# Patient Record
Sex: FEMALE | Race: WHITE | NOT HISPANIC OR LATINO | Employment: PART TIME | URBAN - METROPOLITAN AREA
[De-identification: names, ages, dates, MRNs, and addresses within clinical notes are randomized per-mention and may not be internally consistent; named-entity substitution may affect disease eponyms.]

---

## 2017-03-23 ENCOUNTER — TRANSCRIBE ORDERS (OUTPATIENT)
Dept: ADMINISTRATIVE | Facility: HOSPITAL | Age: 67
End: 2017-03-23

## 2017-03-23 DIAGNOSIS — Z12.31 VISIT FOR SCREENING MAMMOGRAM: Primary | ICD-10-CM

## 2017-04-21 ENCOUNTER — HOSPITAL ENCOUNTER (OUTPATIENT)
Dept: MAMMOGRAPHY | Facility: HOSPITAL | Age: 67
Discharge: HOME | End: 2017-04-21
Admitting: INTERNAL MEDICINE

## 2017-04-21 DIAGNOSIS — Z12.31 VISIT FOR SCREENING MAMMOGRAM: ICD-10-CM

## 2017-04-21 PROCEDURE — G0202 SCR MAMMO BI INCL CAD: HCPCS

## 2017-08-08 DIAGNOSIS — Z00.00 HEALTHCARE MAINTENANCE: Primary | ICD-10-CM

## 2017-08-08 DIAGNOSIS — E78.89 LIPIDS ABNORMAL: ICD-10-CM

## 2017-08-08 DIAGNOSIS — M85.80 OSTEOPENIA: ICD-10-CM

## 2017-08-08 DIAGNOSIS — E55.9 AVITAMINOSIS D: ICD-10-CM

## 2017-08-12 LAB
25(OH)D3+25(OH)D2 SERPL-MCNC: 23.8 NG/ML (ref 30–100)
ALBUMIN SERPL-MCNC: 3.9 G/DL (ref 3.5–5.2)
ALBUMIN/GLOB SERPL: 1.4 G/DL
ALP SERPL-CCNC: 43 U/L (ref 39–117)
ALT SERPL-CCNC: 18 U/L (ref 1–33)
APPEARANCE UR: ABNORMAL
AST SERPL-CCNC: 20 U/L (ref 1–32)
BACTERIA #/AREA URNS HPF: ABNORMAL /HPF
BACTERIA UR CULT: NO GROWTH
BACTERIA UR CULT: NORMAL
BASOPHILS # BLD AUTO: 0.04 10*3/MM3 (ref 0–0.2)
BASOPHILS NFR BLD AUTO: 0.9 % (ref 0–1.5)
BILIRUB SERPL-MCNC: 0.5 MG/DL (ref 0.1–1.2)
BILIRUB UR QL STRIP: NEGATIVE
BUN SERPL-MCNC: 10 MG/DL (ref 8–23)
BUN/CREAT SERPL: 12.3 (ref 7–25)
CALCIUM SERPL-MCNC: 9.3 MG/DL (ref 8.6–10.5)
CASTS URNS MICRO: ABNORMAL
CASTS URNS QL MICRO: PRESENT /LPF
CHLORIDE SERPL-SCNC: 99 MMOL/L (ref 98–107)
CHOLEST SERPL-MCNC: 181 MG/DL (ref 0–200)
CO2 SERPL-SCNC: 27.5 MMOL/L (ref 22–29)
COLOR UR: YELLOW
CREAT SERPL-MCNC: 0.81 MG/DL (ref 0.57–1)
EOSINOPHIL # BLD AUTO: 0.07 10*3/MM3 (ref 0–0.7)
EOSINOPHIL NFR BLD AUTO: 1.5 % (ref 0.3–6.2)
EPI CELLS #/AREA URNS HPF: ABNORMAL /HPF
ERYTHROCYTE [DISTWIDTH] IN BLOOD BY AUTOMATED COUNT: 14 % (ref 11.7–13)
GLOBULIN SER CALC-MCNC: 2.7 GM/DL
GLUCOSE SERPL-MCNC: 95 MG/DL (ref 65–99)
GLUCOSE UR QL: NEGATIVE
HCT VFR BLD AUTO: 41.7 % (ref 35.6–45.5)
HDLC SERPL-MCNC: 59 MG/DL (ref 40–60)
HGB BLD-MCNC: 13 G/DL (ref 11.9–15.5)
HGB UR QL STRIP: NEGATIVE
IMM GRANULOCYTES # BLD: 0 10*3/MM3 (ref 0–0.03)
IMM GRANULOCYTES NFR BLD: 0 % (ref 0–0.5)
KETONES UR QL STRIP: ABNORMAL
LDLC SERPL CALC-MCNC: 112 MG/DL (ref 0–100)
LEUKOCYTE ESTERASE UR QL STRIP: ABNORMAL
LYMPHOCYTES # BLD AUTO: 1.78 10*3/MM3 (ref 0.9–4.8)
LYMPHOCYTES NFR BLD AUTO: 38.9 % (ref 19.6–45.3)
MCH RBC QN AUTO: 30.1 PG (ref 26.9–32)
MCHC RBC AUTO-ENTMCNC: 31.2 G/DL (ref 32.4–36.3)
MCV RBC AUTO: 96.5 FL (ref 80.5–98.2)
MICRO URNS: ABNORMAL
MONOCYTES # BLD AUTO: 0.46 10*3/MM3 (ref 0.2–1.2)
MONOCYTES NFR BLD AUTO: 10 % (ref 5–12)
MUCOUS THREADS URNS QL MICRO: PRESENT /HPF
NEUTROPHILS # BLD AUTO: 2.23 10*3/MM3 (ref 1.9–8.1)
NEUTROPHILS NFR BLD AUTO: 48.7 % (ref 42.7–76)
NITRITE UR QL STRIP: NEGATIVE
PH UR STRIP: 6 [PH] (ref 5–7.5)
PLATELET # BLD AUTO: 211 10*3/MM3 (ref 140–500)
POTASSIUM SERPL-SCNC: 4.2 MMOL/L (ref 3.5–5.2)
PROT SERPL-MCNC: 6.6 G/DL (ref 6–8.5)
PROT UR QL STRIP: ABNORMAL
RBC # BLD AUTO: 4.32 10*6/MM3 (ref 3.9–5.2)
RBC #/AREA URNS HPF: ABNORMAL /HPF
SODIUM SERPL-SCNC: 139 MMOL/L (ref 136–145)
SP GR UR: 1.02 (ref 1–1.03)
TRIGL SERPL-MCNC: 51 MG/DL (ref 0–150)
URINALYSIS REFLEX: ABNORMAL
UROBILINOGEN UR STRIP-MCNC: 0.2 MG/DL (ref 0.2–1)
VLDLC SERPL CALC-MCNC: 10.2 MG/DL (ref 5–40)
WBC # BLD AUTO: 4.58 10*3/MM3 (ref 4.5–10.7)
WBC #/AREA URNS HPF: ABNORMAL /HPF

## 2017-08-17 ENCOUNTER — OFFICE VISIT (OUTPATIENT)
Dept: INTERNAL MEDICINE | Facility: CLINIC | Age: 67
End: 2017-08-17

## 2017-08-17 VITALS
TEMPERATURE: 98.2 F | RESPIRATION RATE: 12 BRPM | WEIGHT: 128 LBS | HEIGHT: 66 IN | OXYGEN SATURATION: 99 % | HEART RATE: 66 BPM | SYSTOLIC BLOOD PRESSURE: 118 MMHG | BODY MASS INDEX: 20.57 KG/M2 | DIASTOLIC BLOOD PRESSURE: 70 MMHG

## 2017-08-17 DIAGNOSIS — M19.042 PRIMARY OSTEOARTHRITIS OF BOTH HANDS: ICD-10-CM

## 2017-08-17 DIAGNOSIS — M85.9 DISORDER OF BONE DENSITY AND STRUCTURE, UNSPECIFIED: ICD-10-CM

## 2017-08-17 DIAGNOSIS — M85.80 OSTEOPENIA: ICD-10-CM

## 2017-08-17 DIAGNOSIS — Z00.00 HEALTHCARE MAINTENANCE: Primary | ICD-10-CM

## 2017-08-17 DIAGNOSIS — E55.9 AVITAMINOSIS D: ICD-10-CM

## 2017-08-17 DIAGNOSIS — H61.23 IMPACTED CERUMEN OF BOTH EARS: ICD-10-CM

## 2017-08-17 DIAGNOSIS — M19.041 PRIMARY OSTEOARTHRITIS OF BOTH HANDS: ICD-10-CM

## 2017-08-17 DIAGNOSIS — Z23 ENCOUNTER FOR IMMUNIZATION: ICD-10-CM

## 2017-08-17 PROCEDURE — G0439 PPPS, SUBSEQ VISIT: HCPCS | Performed by: INTERNAL MEDICINE

## 2017-08-17 PROCEDURE — 99214 OFFICE O/P EST MOD 30 MIN: CPT | Performed by: INTERNAL MEDICINE

## 2017-08-17 PROCEDURE — 77080 DXA BONE DENSITY AXIAL: CPT | Performed by: INTERNAL MEDICINE

## 2017-08-17 PROCEDURE — G0009 ADMIN PNEUMOCOCCAL VACCINE: HCPCS | Performed by: INTERNAL MEDICINE

## 2017-08-17 PROCEDURE — 90732 PPSV23 VACC 2 YRS+ SUBQ/IM: CPT | Performed by: INTERNAL MEDICINE

## 2017-08-17 NOTE — PROGRESS NOTES
Annual Exam (review of medical issues )      HPI  Jailene Molina is a 66 y.o. female RTC In yearly CPE, review of medical issues:  1. Osteopenia - progressive at hip, but with inflated FRAX score in 7/2015. Urged pt to restart exercise and c/w Ca++D daily.  Will trend DEXA 7/2017.   2. Vitamin D deficiency -  A bit low on labs. Restart daily OTC Vitamin D 2000 I.U.  3. Osteoarthritis - noted in B hands on exam.  Minimal pain and only brief AM stiffness. Stay active. Tylenol PRN OK.   4. HM - labs d/w pt; Flu - next season; Tdap - UTD; Prevnar - today; Zostavax - check with insurance and OK to get after two week bibiana from Prevnar; C-scope UTD 2013 (-) --> 10 years; Pap - 2015 with Gyn, repeat in 3 years; Mammo 4/2016 --> 1 year; DEXA as above; HepCAb (-) 2014 labs; Add more exercise        ROS    Problem List:    Patient Active Problem List   Diagnosis   • Osteopenia   • Avitaminosis D   • Atrophy of vagina   • Osteoarthritis of both hands       Medical History:    Past Medical History:   Diagnosis Date   • Atrophy of vagina 7/28/2016    Description: ntoed at Gyn 2014   • Avitaminosis D 7/28/2016   • Detached retina    • Osteoarthritis of both hands 8/5/2016    With AM stiffness < 30 minutes; noted DIP joint hypertrophy   • Osteopenia 7/28/2016    Description: mild at hip; artificially high FRAX in 2015; urine NTX borderline elevated. No meds started. Repeat DEXA in 7/2017        Social History:    Social History     Social History   • Marital status: Single     Spouse name: N/A   • Number of children: 0   • Years of education: N/A     Occupational History   • [employer]       Part-time     Social History Main Topics   • Smoking status: Former Smoker   • Smokeless tobacco: Not on file      Comment: 20 pk/ yr hx; quit in 40's.   • Alcohol use Yes      Comment: Rare   • Drug use: No   • Sexual activity: No     Other Topics Concern   • Not on file     Social History Narrative    Diet - overall improved.   Cooks at home at times. Lots of fruits and veggies.    Exercise - Occasional walking       Family History:   Family History   Problem Relation Age of Onset   • COPD Mother    • Glaucoma Mother    • Heart disease Father      MI   • Heart disease Brother      x 1, s/p PCI   • Glaucoma Maternal Grandmother        Surgical History:   Past Surgical History:   Procedure Laterality Date   • BREAST BIOPSY  1995    benign   • CATARACT EXTRACTION Bilateral 2008         Current Outpatient Prescriptions:   •  calcium (OS-MARLENE) 600 MG tablet, Take  by mouth daily., Disp: , Rfl:   •  Cholecalciferol (VITAMIN D3) 2000 UNITS capsule, Take  by mouth., Disp: , Rfl:   •  methylcellulose, Laxative, (CITRUCEL) 500 MG tablet tablet, Take  by mouth., Disp: , Rfl:   •  Multiple Vitamins-Minerals (CENTRUM SILVER) tablet, Take  by mouth., Disp: , Rfl:     Vitals:    08/17/17 1042   BP: 118/70   Pulse: 66   Resp: 12   Temp: 98.2 °F (36.8 °C)   SpO2: 99%       Physical Exam    Assessment/ Plan  There are no diagnoses linked to this encounter.    No Follow-up on file.      Discussion:  ***

## 2017-08-17 NOTE — PATIENT INSTRUCTIONS
Medicare Wellness  Personal Prevention Plan of Service     Date of Office Visit:  2017  Encounter Provider:  Allan Daugherty MD  Place of Service:  [facility] INTER[facility] MED[facility]  Patient Name: Jailene Molina  :  1950    As part of the Medicare Wellness portion of your visit today, we are providing you with this personalized preventive plan of services (PPPS). This plan is based upon recommendations of the [place] Preventive Services Task Force (USPSTF) and the Advisory Committee on Immunization Practices (ACIP).    This lists the preventive care services that should be considered, and provides dates of when you are due. Items listed as completed are up-to-date and do not require any further intervention.    Health Maintenance   Topic Date Due   • ZOSTER VACCINE  2016   • INFLUENZA VACCINE  2017   • MEDICARE ANNUAL WELLNESS  2018   • MAMMOGRAM  2019   • DXA SCAN  2019   • TDAP/TD VACCINES (2 - Td) 2020   • COLONOSCOPY  2023   • HEPATITIS C SCREENING  Completed   • PNEUMOCOCCAL VACCINES (65+ LOW/MEDIUM RISK)  Completed       Orders Placed This Encounter   Procedures   • DEXA Bone Density Axial     Order Specific Question:   Reason for Exam:     Answer:   Osteopenia   • Pneumococcal Polysaccharide Vaccine 23-Valent Greater Than or Equal To 3yo Subcutaneous / IM       No Follow-up on file.

## 2017-08-17 NOTE — PROGRESS NOTES
"Subjective       Chief Complaint   Patient presents with   • Annual Exam     review of medical issues        HPI:  Jailene Molina is a 66 y.o. 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  Pt is in process of moving to change and move out of mother's house.  Has gotten a condo and is excited about her move.   Pt notes her health has been good. Has been taking any of her MVI or Glen++ in last year.  Has joined [employer] and going 3x/ week in AM.  Started going in 2/2017.   1. Osteopenia - noted on past DEXA.  Did not have DEXA prior to today's visit.   2. Vitamin D deficiency -  off OTC Vitamin D 2000 I.U.  3. Osteoarthritis, in B hands - notes that hands are getting larger at knuckles.  Joints do swell but are do not turn red or get tender to touch. Notes AM stiffness and can take from one hour to midday to loosen up.  Has been taking Advil most every morning for hand pain.  Usually only one pill in AM and sometimes later in day.        The following portions of the patient's history were reviewed and updated as appropriate: allergies, current medications, past family history, past medical history, past social history, past surgical history and problem list.    Review of Systems   Constitution: Positive for weight loss (intentional with work at gym). Negative for chills, fever and malaise/fatigue (\"I feel better, more energy\". ).   HENT: Negative for congestion, headaches, hearing loss, odynophagia and sore throat.    Eyes: Negative for discharge, double vision, pain and redness.        Last eye exam 9/2016; sees yearly; wears glasses     Cardiovascular: Negative for chest pain, dyspnea on exertion, irregular heartbeat, leg swelling, near-syncope, palpitations and syncope.   Respiratory: Negative for cough.    Skin: Negative for rash and suspicious lesions.   Musculoskeletal: Positive for arthritis " "and joint pain (hands). Negative for joint swelling, muscle cramps and muscle weakness.   Gastrointestinal: Negative for constipation, diarrhea, dysphagia, heartburn, nausea and vomiting.   Genitourinary: Negative for dysuria, frequency, hematuria and hesitancy.   Neurological: Negative for dizziness and light-headedness.   Psychiatric/Behavioral: Negative for depression. The patient does not have insomnia and is not nervous/anxious.         Has had \"normal reaction\" after passing of mothetr         Patient Care Team:  Allan Daugherty MD as PCP - General (Internal Medicine)  Allan Daugherty MD as PCP - Claims Attributed    Recent Hospitalizations: No recent hospitalization(s).    Depression Screen:   PHQ-9 Depression Screening 8/17/2017   Little interest or pleasure in doing things 0   Feeling down, depressed, or hopeless 0   Trouble falling or staying asleep, or sleeping too much 0   Feeling tired or having little energy 0   Poor appetite or overeating 0   Feeling bad about yourself - or that you are a failure or have let yourself or your family down 0   Trouble concentrating on things, such as reading the newspaper or watching television 0   Moving or speaking so slowly that other people could have noticed. Or the opposite - being so fidgety or restless that you have been moving around a lot more than usual 0   Thoughts that you would be better off dead, or of hurting yourself in some way 0   PHQ-9 Total Score 0   If you checked off any problems, how difficult have these problems made it for you to do your work, take care of things at home, or get along with other people? Not difficult at all       Functional and Cognitive Screening:  Functional & Cognitive Status 8/17/2017   Do you have difficulty preparing food and eating? No   Do you have difficulty bathing yourself? No   Do you have difficulty getting dressed? No   Do you have difficulty using the toilet? No   Do you have difficulty moving around from place to " "place? No   In the past year have you fallen or experienced a near fall? No   Do you need help using the phone?  No   Are you deaf or do you have serious difficulty hearing?  No   Do you need help with transportation? No   Do you need help shopping? No   Do you need help preparing meals?  No   Do you need help with housework?  No   Do you need help with laundry? No   Do you need help taking your medications? No   Do you need help managing money? No   Do you have difficulty concentrating, remembering or making decisions? No       Does the patient have evidence of cognitive impairment? no    Does not need ASA (and currently is not on it)    Vitals:    08/17/17 1042   BP: 118/70   Pulse: 66   Resp: 12   Temp: 98.2 °F (36.8 °C)   SpO2: 99%   Weight: 128 lb (58.1 kg)   Height: 66\" (167.6 cm)   PainSc: 0-No pain       Body mass index is 20.66 kg/(m^2).    Visual Acuity:  No exam data present    Advanced Care Planning:  has an advance directive - a copy HAS NOT been provided. Have asked the patient to send this to us to add to record.    Objective   Physical Exam   Constitutional: She is oriented to person, place, and time. She appears well-developed and well-nourished. No distress.   HENT:   Head: Normocephalic and atraumatic.   Right Ear: Hearing and external ear normal.   Left Ear: Hearing and external ear normal.   Nose: Nose normal.   Mouth/Throat: Uvula is midline, oropharynx is clear and moist and mucous membranes are normal.   Cerumen obscures TM B   Eyes: Conjunctivae, EOM and lids are normal. Pupils are equal, round, and reactive to light.   Neck: Trachea normal, normal range of motion and full passive range of motion without pain. Neck supple. Carotid bruit is not present. No thyroid mass and no thyromegaly present.   Cardiovascular: Normal rate, regular rhythm, S1 normal, S2 normal, normal heart sounds and intact distal pulses.  Exam reveals no gallop and no friction rub.    No murmur heard.  Pulses:       " Radial pulses are 2+ on the right side, and 2+ on the left side.        Dorsalis pedis pulses are 2+ on the right side, and 2+ on the left side.        Posterior tibial pulses are 2+ on the right side, and 2+ on the left side.   Pulmonary/Chest: Effort normal and breath sounds normal. No respiratory distress. She has no wheezes. She has no rhonchi. She has no rales. She exhibits no mass. Right breast exhibits no inverted nipple, no mass, no nipple discharge and no skin change. Left breast exhibits no inverted nipple, no mass, no nipple discharge and no skin change.   Chaperone present for exam     Abdominal: Soft. Normal appearance and bowel sounds are normal. She exhibits no distension and no mass. There is no hepatosplenomegaly. There is no tenderness. There is no rebound and no guarding.   Musculoskeletal: Normal range of motion. She exhibits no edema.        Right hand: She exhibits normal range of motion, no tenderness and no bony tenderness.        Left hand: She exhibits normal range of motion, no tenderness and no bony tenderness.        Hands:      Vascular Status -  Her exam exhibits right foot vasculature abnormal and no right foot edema. Her exam exhibits left foot vasculature abnormal and no left foot edema.  Lymphadenopathy:     She has no cervical adenopathy.     She has no axillary adenopathy.        Right: No inguinal adenopathy present.        Left: No inguinal adenopathy present.   Neurological: She is alert and oriented to person, place, and time. She has normal strength and normal reflexes. She displays no tremor. No cranial nerve deficit or sensory deficit. She exhibits normal muscle tone. Gait normal.   Skin: Skin is warm and dry. No rash noted.        Psychiatric: She has a normal mood and affect. Her behavior is normal. Cognition and memory are normal.   Vitals reviewed.      Assessment/Plan   Problems Addressed this Visit     Avitaminosis D    Osteoarthritis of both hands    Osteopenia -  Primary    Relevant Orders    DEXA Bone Density Axial (Completed)      Other Visit Diagnoses     Healthcare maintenance        Relevant Orders    Pneumococcal Polysaccharide Vaccine 23-Valent Greater Than or Equal To 3yo Subcutaneous / IM (Completed)    Disorder of bone density and structure, unspecified         Relevant Orders    DEXA Bone Density Axial (Completed)    Encounter for immunization         Relevant Orders    Pneumococcal Polysaccharide Vaccine 23-Valent Greater Than or Equal To 3yo Subcutaneous / IM (Completed)              Diagnoses and all orders for this visit:    Osteopenia  -     DEXA Bone Density Axial    Healthcare maintenance  -     Pneumococcal Polysaccharide Vaccine 23-Valent Greater Than or Equal To 3yo Subcutaneous / IM    Avitaminosis D    Primary osteoarthritis of both hands    Disorder of bone density and structure, unspecified   -     DEXA Bone Density Axial    Encounter for immunization   -     Pneumococcal Polysaccharide Vaccine 23-Valent Greater Than or Equal To 3yo Subcutaneous / IM        Jailene Molina is a 66 y.o. female RTC In yearly CPE, review of medical issues: Doing well overall, lost mother in last year and is currently in process of moving.   1. Osteopenia - persists on DEXA today with more focal osteopenia at neck of hip (T score - 2.3). I d/w pt and noted that we are still early in course of her exercising and she is not on Vitamin D and Ca++ as she should be. Given FRAX score, I do think we can hold on Rx meds at this time, but I challenged pt to work with  at gym to develop an exercise routine that will increase weight bearing and allow some resistance training.  Pt to restart Ca++ 600mg daily and Vitamin D3 2000 I.U. Daily.  Trend DEXA 8/2019.   2. Vitamin D deficiency -  persits, off OTC Vitamin D 2000 I.U.  Restart supplementation.   3. Osteoarthritis, in B hands - slightly progressive sx.  Exam is c/w OA today despite some prolonged AM stiffness.   Pt  taking Advil low dose daily, though I would prefer Tylenol daily given risks with daily NSAIDs. Will monitor for now and pt to remain active.   4. Cerumen impaction B - dry and crusted, unlikley to be able to lavage.  I asked pt to use hydrogen peroxide drops several nights/ week prior to shower until wax flushes out. Can RTC for lavage if desires after several hydrogen peroxide txs.   5. HM - labs d/w pt; Flu - next season; Tdap/ Prevnar - UTD; Pvax - today; Zostavax - get at pharmacy; C-scope UTD 2013 (-) --> 10 years; Pap - 2015 with Gyn, repeat in 3 years; Mammo 4/2017 --> 1 year, breast exam OK today; DEXA as above; HepCAb (-) 2014 labs; Add more exercise      Return in about 1 year (around 8/17/2018) for Medicare Wellness.          Current Outpatient Prescriptions:   •  calcium (OS-MARLENE) 600 MG tablet, Take  by mouth daily., Disp: , Rfl:   •  Cholecalciferol (VITAMIN D3) 2000 UNITS capsule, Take  by mouth., Disp: , Rfl:   •  methylcellulose, Laxative, (CITRUCEL) 500 MG tablet tablet, Take  by mouth., Disp: , Rfl:

## 2018-04-17 ENCOUNTER — TRANSCRIBE ORDERS (OUTPATIENT)
Dept: ADMINISTRATIVE | Facility: HOSPITAL | Age: 68
End: 2018-04-17

## 2018-04-17 DIAGNOSIS — Z12.31 VISIT FOR SCREENING MAMMOGRAM: Primary | ICD-10-CM

## 2018-05-01 ENCOUNTER — APPOINTMENT (OUTPATIENT)
Dept: MAMMOGRAPHY | Facility: HOSPITAL | Age: 68
End: 2018-05-01

## 2018-05-08 ENCOUNTER — HOSPITAL ENCOUNTER (OUTPATIENT)
Dept: MAMMOGRAPHY | Facility: HOSPITAL | Age: 68
Discharge: HOME | End: 2018-05-08
Admitting: INTERNAL MEDICINE

## 2018-05-08 DIAGNOSIS — Z12.31 VISIT FOR SCREENING MAMMOGRAM: ICD-10-CM

## 2018-05-08 PROCEDURE — 77067 SCR MAMMO BI INCL CAD: CPT

## 2018-05-15 ENCOUNTER — TELEPHONE (OUTPATIENT)
Dept: INTERNAL MEDICINE | Facility: CLINIC | Age: 68
End: 2018-05-15

## 2018-05-15 NOTE — TELEPHONE ENCOUNTER
Pt is calling because the last time she was here you all talked about her arthritis in her hands. She stated that she is taking tylenol every 8 hours and it does not seem to be helping. She is wanting to know what she can take to help with the pain.

## 2018-05-15 NOTE — TELEPHONE ENCOUNTER
Well, as we discussed, can try Aleve OTC add on, but daily NSAIDs do carry a CV and GI bleeding risk.  Remaining active, Aspercreme rub, warm water soaks can all help pain scores and are advisable before committing to daily NSAID regimen.

## 2018-08-09 DIAGNOSIS — M19.041 PRIMARY OSTEOARTHRITIS OF BOTH HANDS: ICD-10-CM

## 2018-08-09 DIAGNOSIS — N95.2 ATROPHY OF VAGINA: ICD-10-CM

## 2018-08-09 DIAGNOSIS — E78.89 LIPIDS ABNORMAL: ICD-10-CM

## 2018-08-09 DIAGNOSIS — E55.9 AVITAMINOSIS D: ICD-10-CM

## 2018-08-09 DIAGNOSIS — Z00.00 HEALTHCARE MAINTENANCE: Primary | ICD-10-CM

## 2018-08-09 DIAGNOSIS — M19.042 PRIMARY OSTEOARTHRITIS OF BOTH HANDS: ICD-10-CM

## 2018-08-14 LAB
25(OH)D3+25(OH)D2 SERPL-MCNC: 23.7 NG/ML (ref 30–100)
ALBUMIN SERPL-MCNC: 4.1 G/DL (ref 3.5–5.2)
ALBUMIN/GLOB SERPL: 1.6 G/DL
ALP SERPL-CCNC: 64 U/L (ref 39–117)
ALT SERPL-CCNC: 19 U/L (ref 1–33)
APPEARANCE UR: CLEAR
AST SERPL-CCNC: 22 U/L (ref 1–32)
BACTERIA #/AREA URNS HPF: NORMAL /HPF
BASOPHILS # BLD AUTO: 0.05 10*3/MM3 (ref 0–0.2)
BASOPHILS NFR BLD AUTO: 0.9 % (ref 0–1.5)
BILIRUB SERPL-MCNC: <0.2 MG/DL (ref 0.1–1.2)
BILIRUB UR QL STRIP: NEGATIVE
BUN SERPL-MCNC: 9 MG/DL (ref 8–23)
BUN/CREAT SERPL: 10.8 (ref 7–25)
CALCIUM SERPL-MCNC: 9.3 MG/DL (ref 8.6–10.5)
CHLORIDE SERPL-SCNC: 102 MMOL/L (ref 98–107)
CHOLEST SERPL-MCNC: 175 MG/DL (ref 0–200)
CO2 SERPL-SCNC: 26.5 MMOL/L (ref 22–29)
COLOR UR: YELLOW
CREAT SERPL-MCNC: 0.83 MG/DL (ref 0.57–1)
EOSINOPHIL # BLD AUTO: 0.08 10*3/MM3 (ref 0–0.7)
EOSINOPHIL NFR BLD AUTO: 1.5 % (ref 0.3–6.2)
EPI CELLS #/AREA URNS HPF: NORMAL /HPF
ERYTHROCYTE [DISTWIDTH] IN BLOOD BY AUTOMATED COUNT: 13.9 % (ref 11.7–13)
GLOBULIN SER CALC-MCNC: 2.5 GM/DL
GLUCOSE SERPL-MCNC: 97 MG/DL (ref 65–99)
GLUCOSE UR QL: NEGATIVE
HCT VFR BLD AUTO: 42.3 % (ref 35.6–45.5)
HDLC SERPL-MCNC: 59 MG/DL (ref 40–60)
HGB BLD-MCNC: 12.9 G/DL (ref 11.9–15.5)
HGB UR QL STRIP: NEGATIVE
IMM GRANULOCYTES # BLD: 0 10*3/MM3 (ref 0–0.03)
IMM GRANULOCYTES NFR BLD: 0 % (ref 0–0.5)
KETONES UR QL STRIP: NEGATIVE
LDLC SERPL CALC-MCNC: 105 MG/DL (ref 0–100)
LEUKOCYTE ESTERASE UR QL STRIP: NEGATIVE
LYMPHOCYTES # BLD AUTO: 1.77 10*3/MM3 (ref 0.9–4.8)
LYMPHOCYTES NFR BLD AUTO: 32.1 % (ref 19.6–45.3)
MCH RBC QN AUTO: 29.7 PG (ref 26.9–32)
MCHC RBC AUTO-ENTMCNC: 30.5 G/DL (ref 32.4–36.3)
MCV RBC AUTO: 97.5 FL (ref 80.5–98.2)
MICRO URNS: NORMAL
MICRO URNS: NORMAL
MONOCYTES # BLD AUTO: 0.59 10*3/MM3 (ref 0.2–1.2)
MONOCYTES NFR BLD AUTO: 10.7 % (ref 5–12)
MUCOUS THREADS URNS QL MICRO: PRESENT /HPF
NEUTROPHILS # BLD AUTO: 3.02 10*3/MM3 (ref 1.9–8.1)
NEUTROPHILS NFR BLD AUTO: 54.8 % (ref 42.7–76)
NITRITE UR QL STRIP: NEGATIVE
PH UR STRIP: 6 [PH] (ref 5–7.5)
PLATELET # BLD AUTO: 243 10*3/MM3 (ref 140–500)
POTASSIUM SERPL-SCNC: 4.3 MMOL/L (ref 3.5–5.2)
PROT SERPL-MCNC: 6.6 G/DL (ref 6–8.5)
PROT UR QL STRIP: NEGATIVE
RBC # BLD AUTO: 4.34 10*6/MM3 (ref 3.9–5.2)
RBC #/AREA URNS HPF: NORMAL /HPF
SODIUM SERPL-SCNC: 140 MMOL/L (ref 136–145)
SP GR UR: 1.01 (ref 1–1.03)
TRIGL SERPL-MCNC: 53 MG/DL (ref 0–150)
URINALYSIS REFLEX: NORMAL
UROBILINOGEN UR STRIP-MCNC: 0.2 MG/DL (ref 0.2–1)
VLDLC SERPL CALC-MCNC: 10.6 MG/DL (ref 5–40)
WBC # BLD AUTO: 5.51 10*3/MM3 (ref 4.5–10.7)
WBC #/AREA URNS HPF: NORMAL /HPF

## 2018-08-20 ENCOUNTER — OFFICE VISIT (OUTPATIENT)
Dept: INTERNAL MEDICINE | Facility: CLINIC | Age: 68
End: 2018-08-20

## 2018-08-20 VITALS
RESPIRATION RATE: 12 BRPM | WEIGHT: 124 LBS | BODY MASS INDEX: 19.93 KG/M2 | DIASTOLIC BLOOD PRESSURE: 80 MMHG | HEART RATE: 64 BPM | OXYGEN SATURATION: 98 % | SYSTOLIC BLOOD PRESSURE: 122 MMHG | TEMPERATURE: 98.1 F | HEIGHT: 66 IN

## 2018-08-20 DIAGNOSIS — M19.041 PRIMARY OSTEOARTHRITIS OF BOTH HANDS: ICD-10-CM

## 2018-08-20 DIAGNOSIS — Z00.00 HEALTHCARE MAINTENANCE: Primary | ICD-10-CM

## 2018-08-20 DIAGNOSIS — F43.81 GRIEF REACTION WITH PROLONGED BEREAVEMENT: ICD-10-CM

## 2018-08-20 DIAGNOSIS — L81.9 ATYPICAL PIGMENTED SKIN LESION: ICD-10-CM

## 2018-08-20 DIAGNOSIS — Z12.31 ENCOUNTER FOR SCREENING MAMMOGRAM FOR MALIGNANT NEOPLASM OF BREAST: ICD-10-CM

## 2018-08-20 DIAGNOSIS — H61.23 BILATERAL IMPACTED CERUMEN: ICD-10-CM

## 2018-08-20 DIAGNOSIS — M19.042 PRIMARY OSTEOARTHRITIS OF BOTH HANDS: ICD-10-CM

## 2018-08-20 DIAGNOSIS — M79.671 FOOT PAIN, BILATERAL: ICD-10-CM

## 2018-08-20 DIAGNOSIS — M85.80 OSTEOPENIA, UNSPECIFIED LOCATION: ICD-10-CM

## 2018-08-20 DIAGNOSIS — B35.1 ONYCHOMYCOSIS OF TOENAIL: ICD-10-CM

## 2018-08-20 DIAGNOSIS — IMO0001 ASYMMETRICAL HEARING LOSS OF LEFT EAR: ICD-10-CM

## 2018-08-20 DIAGNOSIS — E55.9 AVITAMINOSIS D: ICD-10-CM

## 2018-08-20 DIAGNOSIS — M79.672 FOOT PAIN, BILATERAL: ICD-10-CM

## 2018-08-20 PROBLEM — L60.8 ONYCHOMADESIS OF TOENAIL: Status: ACTIVE | Noted: 2018-08-20

## 2018-08-20 PROBLEM — F43.29 GRIEF REACTION WITH PROLONGED BEREAVEMENT: Status: ACTIVE | Noted: 2018-08-20

## 2018-08-20 PROCEDURE — 99214 OFFICE O/P EST MOD 30 MIN: CPT | Performed by: INTERNAL MEDICINE

## 2018-08-20 PROCEDURE — G0439 PPPS, SUBSEQ VISIT: HCPCS | Performed by: INTERNAL MEDICINE

## 2018-08-20 NOTE — PATIENT INSTRUCTIONS
Shingrix (new shingles shot; 2 shot series) check at pharmacy  Hepatitis A (2 shot series)  Flu shot at pharmacy as well.    Medicare Wellness  Personal Prevention Plan of Service     Date of Office Visit:  2018  Encounter Provider:  Allan Daugherty MD  Place of Service:  [facility] INTER[facility] MEDICINE  Patient Name: Jailene Molina  :  1950    As part of the Medicare Wellness portion of your visit today, we are providing you with this personalized preventive plan of services (PPPS). This plan is based upon recommendations of the [place] Preventive Services Task Force (USPSTF) and the Advisory Committee on Immunization Practices (ACIP).    This lists the preventive care services that should be considered, and provides dates of when you are due. Items listed as completed are up-to-date and do not require any further intervention.    Health Maintenance   Topic Date Due   • ZOSTER VACCINE (1 of 2) 2000   • INFLUENZA VACCINE  10/01/2018 (Originally 2018)   • DXA SCAN  2019   • MEDICARE ANNUAL WELLNESS  2019   • TDAP/TD VACCINES (2 - Td) 2020   • MAMMOGRAM  2020   • COLONOSCOPY  2023   • HEPATITIS C SCREENING  Completed   • PNEUMOCOCCAL VACCINES (65+ LOW/MEDIUM RISK)  Completed       Orders Placed This Encounter   Procedures   • Mammo Screening Bilateral With CAD     Standing Status:   Future     Standing Expiration Date:   2019     Order Specific Question:   Reason for Exam:     Answer:   Screening   • Ambulatory Referral to Dermatology     Referral Priority:   Routine     Referral Type:   Consultation     Referral Reason:   Specialty Services Required     Requested Specialty:   Dermatology     Number of Visits Requested:   1   • Ambulatory Referral to Podiatry     Referral Priority:   Routine     Referral Type:   Consultation     Referral Reason:   Specialty Services Required     Referred to Provider:   Yaya Perez DPM      Requested Specialty:   Podiatry     Number of Visits Requested:   1   • Ambulatory Referral to ENT (Otolaryngology)     Referral Priority:   Routine     Referral Type:   Consultation     Referral Reason:   Specialty Services Required     Requested Specialty:   Otolaryngology     Number of Visits Requested:   1   • Ambulatory Referral to Psychology     Referral Priority:   Routine     Referral Type:   Behavorial Health/Psych     Referral Reason:   Specialty Services Required     Requested Specialty:   Psychology     Number of Visits Requested:   1       Return in about 1 year (around 8/20/2019) for Annual physical.

## 2018-08-20 NOTE — PROGRESS NOTES
"Subjective       Chief Complaint   Patient presents with   • Annual Exam     review of medical issues        HPI:  Jailene Molina is a 67 y.o. female RTC In yearly AWV, review of medical issues:  Has had \"a difficult year\".  Has been busy settling mother's estate. Moved to new home once mother passed away.  Had a \"lot of issues\". Issues with new home and having to deal with home issues and getting a new car. 'All this stuff at once'. Still working at [employer] but has a new job at [employer] on [employer]. Has lost some weight with diet mods. \"I feel like I have more energy. My clothes fit better\".    1. Osteopenia - persisted on DEXA 8/2017.  Feels like has been very lax on taking Ca++D.  Has been exercising at [employer] for last year and getting there 3x/ week \"sometimes more\".     2. Vitamin D deficiency -  off OTC Vitamin D 2000 I.U., \"I have been so lax\".    3. Osteoarthritis, in B hands - \"they are sort of bothering me today\".  Notes better in warm weather.      The following portions of the patient's history were reviewed and updated as appropriate: allergies, current medications, past family history, past medical history, past social history, past surgical history and problem list.    Review of Systems   Constitution: Positive for weight loss (with diet mods, feels like can explain). Negative for chills, fever and malaise/fatigue.   HENT: Positive for hearing loss (L > R; not sure if wax or not. Has not had tested. ). Negative for congestion, odynophagia and sore throat.    Eyes: Negative for double vision, photophobia, redness and visual disturbance.        Saw optho 12/2017 but never f/u.  Feels like new glasses are not as good as would like. May have to go back to see with intolerance of new lenses.      Cardiovascular: Negative for chest pain, dyspnea on exertion, irregular heartbeat and palpitations.   Respiratory: Negative for cough and shortness of breath.    Hematologic/Lymphatic: Negative for " "bleeding problem. Does not bruise/bleed easily.   Skin: Positive for suspicious lesions (spot on shoulder, wants looked at, \"just recently noticed it\" ). Negative for rash.   Musculoskeletal: Positive for arthritis (B hands) and joint pain (B hands). Negative for joint swelling, muscle cramps, muscle weakness and myalgias.   Gastrointestinal: Positive for constipation (occasional). Negative for diarrhea, dysphagia, heartburn, nausea and vomiting.   Genitourinary: Negative for bladder incontinence, dysuria, frequency, hematuria and urgency.   Neurological: Negative for dizziness, headaches and light-headedness.   Psychiatric/Behavioral: Negative for depression. The patient does not have insomnia and is not nervous/anxious (at times, more related to grief. ).         Notes has issues with grief. Has really struggled to get over mother's death. Has not used grief counselor at [facility] but has access.        Patient Care Team:  Allan Daugherty MD as PCP - General (Internal Medicine)  Ant Schmid MD as PCP - Claims Attributed    Recent Hospitalizations: No recent hospitalization(s).    Depression Screen:   PHQ-2/PHQ-9 Depression Screening 8/20/2018   Little interest or pleasure in doing things 0   Feeling down, depressed, or hopeless 0   Trouble falling or staying asleep, or sleeping too much 0   Feeling tired or having little energy 0   Poor appetite or overeating 0   Feeling bad about yourself - or that you are a failure or have let yourself or your family down 0   Trouble concentrating on things, such as reading the newspaper or watching television 0   Moving or speaking so slowly that other people could have noticed. Or the opposite - being so fidgety or restless that you have been moving around a lot more than usual 0   Thoughts that you would be better off dead, or of hurting yourself in some way 0   Total Score 0   If you checked off any problems, how difficult have these problems made it for you to do your " "work, take care of things at home, or get along with other people? Not difficult at all       Functional and Cognitive Screening:  Functional & Cognitive Status 8/20/2018   Do you have difficulty preparing food and eating? No   Do you have difficulty bathing yourself, getting dressed or grooming yourself? No   Do you have difficulty using the toilet? No   Do you have difficulty moving around from place to place? No   Do you have trouble with steps or getting out of a bed or a chair? No   In the past year have you fallen or experienced a near fall? No   Current Diet Well Balanced Diet   Dental Exam Up to date   Eye Exam Up to date   Exercise (times per week) 3 times per week   Current Exercise Activities Include Walking   Do you need help using the phone?  No   Are you deaf or do you have serious difficulty hearing?  No   Do you need help with transportation? No   Do you need help shopping? No   Do you need help preparing meals?  No   Do you need help with housework?  No   Do you need help with laundry? No   Do you need help taking your medications? No   Do you need help managing money? No   Do you ever drive or ride in a car without wearing a seat belt? No   Have you felt unusual stress, anger or loneliness in the last month? Yes   Who do you live with? Alone   If you need help, do you have trouble finding someone available to you? No   Have you been bothered in the last four weeks by sexual problems? No   Do you have difficulty concentrating, remembering or making decisions? No       Does the patient have evidence of cognitive impairment? no    Does not need ASA (and currently is not on it)    Vitals:    08/20/18 1019   BP: 122/80   Pulse: 64   Resp: 12   Temp: 98.1 °F (36.7 °C)   SpO2: 98%   Weight: 56.2 kg (124 lb)   Height: 167.6 cm (66\")   PainSc: 0-No pain       Body mass index is 20.01 kg/m².    Visual Acuity:  No exam data present    Advanced Care Planning:  has an advance directive - a copy HAS NOT been " provided. Have asked the patient to send this to us to add to record.    Objective   Physical Exam   Constitutional: She is oriented to person, place, and time. She appears well-developed and well-nourished. No distress.   HENT:   Head: Normocephalic and atraumatic.   Right Ear: External ear and ear canal normal.   Left Ear: External ear and ear canal normal.   Nose: Nose normal.   Mouth/Throat: Uvula is midline, oropharynx is clear and moist and mucous membranes are normal.   Cerumen obscures B, dry and crusted wax   Eyes: Pupils are equal, round, and reactive to light. Conjunctivae, EOM and lids are normal.   Neck: Trachea normal, normal range of motion and full passive range of motion without pain. Neck supple. Carotid bruit is not present. No thyroid mass and no thyromegaly present.   Cardiovascular: Normal rate, regular rhythm, S1 normal, S2 normal, normal heart sounds and intact distal pulses.  Exam reveals no gallop and no friction rub.    No murmur heard.  Pulses:       Radial pulses are 2+ on the right side, and 2+ on the left side.        Dorsalis pedis pulses are 2+ on the right side, and 2+ on the left side.        Posterior tibial pulses are 2+ on the right side, and 2+ on the left side.   Pulmonary/Chest: Effort normal and breath sounds normal. No respiratory distress. She has no decreased breath sounds. She has no wheezes. She has no rhonchi. She has no rales. She exhibits no mass. Right breast exhibits no inverted nipple, no mass, no nipple discharge and no skin change. Left breast exhibits no inverted nipple, no mass, no nipple discharge and no skin change.   Abdominal: Soft. Normal appearance and bowel sounds are normal. She exhibits no distension and no mass. There is no hepatosplenomegaly. There is no tenderness. There is no rebound and no guarding.   Musculoskeletal: Normal range of motion. She exhibits no edema.        Right hand: She exhibits deformity (DIP joint hypertrophy). She exhibits  normal range of motion, no tenderness and no bony tenderness.        Left hand: She exhibits deformity (DIP joint hypertrophy). She exhibits normal range of motion, no tenderness and no bony tenderness.     Vascular Status -  Her right foot exhibits normal foot vasculature  and no edema. Her left foot exhibits normal foot vasculature  and no edema.  Skin Integrity  -  Her right foot skin is intact.Her left foot skin is intact..  Lymphadenopathy:     She has no cervical adenopathy.     She has no axillary adenopathy.        Right: No inguinal adenopathy present.        Left: No inguinal adenopathy present.   Neurological: She is alert and oriented to person, place, and time. She has normal strength and normal reflexes. She displays no tremor. No cranial nerve deficit or sensory deficit. She exhibits normal muscle tone. Gait normal.   Skin: Skin is warm and dry. No rash noted.        Psychiatric: She has a normal mood and affect. Her behavior is normal. Thought content normal. Cognition and memory are normal.   Vitals reviewed.      Assessment/Plan   Problems Addressed this Visit     Osteopenia    Avitaminosis D    Osteoarthritis of both hands    Onychomycosis of toenail    Grief reaction with prolonged bereavement    Relevant Orders    Ambulatory Referral to Psychology      Other Visit Diagnoses     Healthcare maintenance    -  Primary    Relevant Orders    Mammo Screening Bilateral With CAD    Bilateral impacted cerumen        Relevant Orders    Ambulatory Referral to ENT (Otolaryngology)    Atypical pigmented skin lesion        Relevant Orders    Ambulatory Referral to Dermatology    Foot pain, bilateral        Relevant Orders    Ambulatory Referral to Podiatry    Encounter for screening mammogram for malignant neoplasm of breast         Relevant Orders    Mammo Screening Bilateral With CAD    Asymmetrical hearing loss of left ear        Relevant Orders    Ambulatory Referral to ENT (Otolaryngology)               Diagnoses and all orders for this visit:    Healthcare maintenance  -     Mammo Screening Bilateral With CAD; Future    Onychomycosis of toenail    Primary osteoarthritis of both hands    Avitaminosis D    Osteopenia, unspecified location    Bilateral impacted cerumen  -     Ambulatory Referral to ENT (Otolaryngology)    Atypical pigmented skin lesion  -     Ambulatory Referral to Dermatology    Foot pain, bilateral  -     Ambulatory Referral to Podiatry    Encounter for screening mammogram for malignant neoplasm of breast   -     Mammo Screening Bilateral With CAD; Future    Asymmetrical hearing loss of left ear  -     Ambulatory Referral to ENT (Otolaryngology)    Grief reaction with prolonged bereavement  -     Ambulatory Referral to Psychology    Other orders  -     Multiple Vitamins-Minerals (CENTRUM SILVER PO); Take  by mouth.        Jailene Molina is a 67 y.o. female RTC In yearly AWV, review of medical issues:  Pt having a hard time dealing with loss of mother last year and associated life changes/ living alone.    1. Osteopenia - persisted on DEXA 8/2017. C/W exercise at [facility] 3x/ week. Add back Ca++D daily.  Recheck DEXA 8/2019.   2. Vitamin D deficiency - persists off Vitamin D3 2000 I.U.  Restart supplementation.   3. Osteoarthritis, in B hands -  Exam remains c/w OA. Off meds at this time.    4. Cerumen impaction B with new L > R hearing loss - dry and crusted, recurrent from last year. Unable to lavage in office today B.  Will refer to ENT for vacuum assisted removal and hearing test with audiology.   5. Prolonged grief- pt eluded to issue through visit but brings up as issue at end of visit. Declines any meds but asks about talk therapy options.  Referral list for psychology provided to pt.  Pt has group grief counseling at [facility] and will contact them as well.  RTC if not better. Meds may be consideration if does not make progress in therapy.  6. Onychomycosis - B great toes, with  reported discoloration (covered with polish today). I suspect pt has toenail trauma with shoes leading to dark spots under nail.  Will have pt remove polish and see podiatry for nail care/ trimming and assessment of footwear/ need for orthotics.    7. Atypical skin lesion R shoulder - not c/w with SK or typical nevi noted on rest of pts back. I think this deserves an eval and bx consideration with derm. Referral placed.   8. HM - labs d/w pt; Flu - next season; Tdap/ Pvax/ Prevnar - UTD; Hep A and Shingrix at pharmacy; C-scope UTD 2013 (-) --> 10 years; Pap - 2015 with Gyn, due for 3 year f/u; Mammo due, referral placed;  DEXA as above; HepCAb (-) 2014 labs; C/W exercise; Preventative care plan provided to pt at end of visit    Return in about 1 year (around 8/20/2019) for Annual physical.          Current Outpatient Prescriptions:   •  Cholecalciferol (VITAMIN D3) 2000 UNITS capsule, Take  by mouth., Disp: , Rfl:   •  methylcellulose, Laxative, (CITRUCEL) 500 MG tablet tablet, Take  by mouth., Disp: , Rfl:   •  Multiple Vitamins-Minerals (CENTRUM SILVER PO), Take  by mouth., Disp: , Rfl:

## 2018-11-12 ENCOUNTER — FLU SHOT (OUTPATIENT)
Dept: INTERNAL MEDICINE | Facility: CLINIC | Age: 68
End: 2018-11-12

## 2018-11-12 PROCEDURE — 90662 IIV NO PRSV INCREASED AG IM: CPT | Performed by: INTERNAL MEDICINE

## 2018-11-12 PROCEDURE — G0008 ADMIN INFLUENZA VIRUS VAC: HCPCS | Performed by: INTERNAL MEDICINE

## 2019-04-24 ENCOUNTER — TRANSCRIBE ORDERS (OUTPATIENT)
Dept: ADMINISTRATIVE | Facility: HOSPITAL | Age: 69
End: 2019-04-24

## 2019-04-24 DIAGNOSIS — Z12.31 VISIT FOR SCREENING MAMMOGRAM: Primary | ICD-10-CM

## 2019-05-28 ENCOUNTER — HOSPITAL ENCOUNTER (OUTPATIENT)
Dept: MAMMOGRAPHY | Facility: HOSPITAL | Age: 69
Discharge: HOME | End: 2019-05-28
Admitting: INTERNAL MEDICINE

## 2019-05-28 DIAGNOSIS — Z12.31 VISIT FOR SCREENING MAMMOGRAM: ICD-10-CM

## 2019-05-28 PROCEDURE — 77067 SCR MAMMO BI INCL CAD: CPT

## 2019-05-28 PROCEDURE — 77063 BREAST TOMOSYNTHESIS BI: CPT

## 2019-08-16 DIAGNOSIS — E55.9 AVITAMINOSIS D: ICD-10-CM

## 2019-08-16 DIAGNOSIS — E78.89 LIPIDS ABNORMAL: ICD-10-CM

## 2019-08-16 DIAGNOSIS — Z00.00 HEALTHCARE MAINTENANCE: Primary | ICD-10-CM

## 2019-08-16 DIAGNOSIS — M85.80 OSTEOPENIA, UNSPECIFIED LOCATION: ICD-10-CM

## 2019-08-21 LAB
25(OH)D3+25(OH)D2 SERPL-MCNC: 24.1 NG/ML (ref 30–100)
ALBUMIN SERPL-MCNC: 4.4 G/DL (ref 3.5–5.2)
ALBUMIN/GLOB SERPL: 2 G/DL
ALP SERPL-CCNC: 56 U/L (ref 39–117)
ALT SERPL-CCNC: 12 U/L (ref 1–33)
APPEARANCE UR: CLEAR
AST SERPL-CCNC: 17 U/L (ref 1–32)
BACTERIA #/AREA URNS HPF: ABNORMAL /HPF
BASOPHILS # BLD AUTO: 0.04 10*3/MM3 (ref 0–0.2)
BASOPHILS NFR BLD AUTO: 1 % (ref 0–1.5)
BILIRUB SERPL-MCNC: 0.3 MG/DL (ref 0.2–1.2)
BILIRUB UR QL STRIP: NEGATIVE
BUN SERPL-MCNC: 8 MG/DL (ref 8–23)
BUN/CREAT SERPL: 12.1 (ref 7–25)
CALCIUM SERPL-MCNC: 9 MG/DL (ref 8.6–10.5)
CHLORIDE SERPL-SCNC: 101 MMOL/L (ref 98–107)
CHOLEST SERPL-MCNC: 182 MG/DL (ref 0–200)
CO2 SERPL-SCNC: 26.3 MMOL/L (ref 22–29)
COLOR UR: YELLOW
CREAT SERPL-MCNC: 0.66 MG/DL (ref 0.57–1)
CRYSTALS URNS MICRO: ABNORMAL
EOSINOPHIL # BLD AUTO: 0.05 10*3/MM3 (ref 0–0.4)
EOSINOPHIL NFR BLD AUTO: 1.3 % (ref 0.3–6.2)
EPI CELLS #/AREA URNS HPF: ABNORMAL /HPF
ERYTHROCYTE [DISTWIDTH] IN BLOOD BY AUTOMATED COUNT: 13.8 % (ref 12.3–15.4)
GLOBULIN SER CALC-MCNC: 2.2 GM/DL
GLUCOSE SERPL-MCNC: 95 MG/DL (ref 65–99)
GLUCOSE UR QL: NEGATIVE
HCT VFR BLD AUTO: 42.4 % (ref 34–46.6)
HDLC SERPL-MCNC: 61 MG/DL (ref 40–60)
HGB BLD-MCNC: 13.1 G/DL (ref 12–15.9)
HGB UR QL STRIP: NEGATIVE
IMM GRANULOCYTES # BLD AUTO: 0.01 10*3/MM3 (ref 0–0.05)
IMM GRANULOCYTES NFR BLD AUTO: 0.3 % (ref 0–0.5)
KETONES UR QL STRIP: NEGATIVE
LDLC SERPL CALC-MCNC: 110 MG/DL (ref 0–100)
LEUKOCYTE ESTERASE UR QL STRIP: NEGATIVE
LYMPHOCYTES # BLD AUTO: 1.17 10*3/MM3 (ref 0.7–3.1)
LYMPHOCYTES NFR BLD AUTO: 30.7 % (ref 19.6–45.3)
MCH RBC QN AUTO: 30.1 PG (ref 26.6–33)
MCHC RBC AUTO-ENTMCNC: 30.9 G/DL (ref 31.5–35.7)
MCV RBC AUTO: 97.5 FL (ref 79–97)
MICRO URNS: NORMAL
MICRO URNS: NORMAL
MONOCYTES # BLD AUTO: 0.47 10*3/MM3 (ref 0.1–0.9)
MONOCYTES NFR BLD AUTO: 12.3 % (ref 5–12)
MUCOUS THREADS URNS QL MICRO: PRESENT /HPF
NEUTROPHILS # BLD AUTO: 2.07 10*3/MM3 (ref 1.7–7)
NEUTROPHILS NFR BLD AUTO: 54.4 % (ref 42.7–76)
NITRITE UR QL STRIP: NEGATIVE
NRBC BLD AUTO-RTO: 0 /100 WBC (ref 0–0.2)
PH UR STRIP: 7 [PH] (ref 5–7.5)
PLATELET # BLD AUTO: 250 10*3/MM3 (ref 140–450)
POTASSIUM SERPL-SCNC: 4.5 MMOL/L (ref 3.5–5.2)
PROT SERPL-MCNC: 6.6 G/DL (ref 6–8.5)
PROT UR QL STRIP: NEGATIVE
RBC # BLD AUTO: 4.35 10*6/MM3 (ref 3.77–5.28)
RBC #/AREA URNS HPF: ABNORMAL /HPF
SODIUM SERPL-SCNC: 138 MMOL/L (ref 136–145)
SP GR UR: 1.01 (ref 1–1.03)
TRIGL SERPL-MCNC: 54 MG/DL (ref 0–150)
UNIDENT CRYS URNS QL MICRO: PRESENT /LPF
URINALYSIS REFLEX: NORMAL
UROBILINOGEN UR STRIP-MCNC: 0.2 MG/DL (ref 0.2–1)
VLDLC SERPL CALC-MCNC: 10.8 MG/DL
WBC # BLD AUTO: 3.81 10*3/MM3 (ref 3.4–10.8)
WBC #/AREA URNS HPF: ABNORMAL /HPF

## 2019-08-27 ENCOUNTER — OFFICE VISIT (OUTPATIENT)
Dept: INTERNAL MEDICINE | Facility: CLINIC | Age: 69
End: 2019-08-27

## 2019-08-27 VITALS
HEART RATE: 68 BPM | OXYGEN SATURATION: 98 % | TEMPERATURE: 97.6 F | WEIGHT: 126 LBS | RESPIRATION RATE: 12 BRPM | BODY MASS INDEX: 20.25 KG/M2 | SYSTOLIC BLOOD PRESSURE: 130 MMHG | DIASTOLIC BLOOD PRESSURE: 80 MMHG | HEIGHT: 66 IN

## 2019-08-27 DIAGNOSIS — M85.80 OSTEOPENIA, UNSPECIFIED LOCATION: ICD-10-CM

## 2019-08-27 DIAGNOSIS — Z00.00 HEALTHCARE MAINTENANCE: Primary | ICD-10-CM

## 2019-08-27 DIAGNOSIS — E55.9 AVITAMINOSIS D: ICD-10-CM

## 2019-08-27 DIAGNOSIS — R19.7 DIARRHEA, UNSPECIFIED TYPE: ICD-10-CM

## 2019-08-27 DIAGNOSIS — M19.042 PRIMARY OSTEOARTHRITIS OF BOTH HANDS: ICD-10-CM

## 2019-08-27 DIAGNOSIS — M19.041 PRIMARY OSTEOARTHRITIS OF BOTH HANDS: ICD-10-CM

## 2019-08-27 PROBLEM — F43.29 GRIEF REACTION WITH PROLONGED BEREAVEMENT: Status: RESOLVED | Noted: 2018-08-20 | Resolved: 2019-08-27

## 2019-08-27 PROBLEM — F41.9 ANXIETY: Status: ACTIVE | Noted: 2019-08-27

## 2019-08-27 PROBLEM — F43.81 GRIEF REACTION WITH PROLONGED BEREAVEMENT: Status: RESOLVED | Noted: 2018-08-20 | Resolved: 2019-08-27

## 2019-08-27 PROCEDURE — 99214 OFFICE O/P EST MOD 30 MIN: CPT | Performed by: INTERNAL MEDICINE

## 2019-08-27 PROCEDURE — G0439 PPPS, SUBSEQ VISIT: HCPCS | Performed by: INTERNAL MEDICINE

## 2019-08-27 NOTE — PROGRESS NOTES
"Subjective       Chief Complaint   Patient presents with   • Annual Exam     review of medical issues    • Diarrhea       HPI:  Jailene Molina is a 68 y.o. female RTC in yearly AWV, review of medical issues:  \"It has been a crazy year\".  Has moved a few times this year. Working part time and [facility] [employer] [employer] \"THat is a good outlet for me\". Feels like had gotten self \"overly anxious\".  In last few weeks has had some diarrhea on occasion. Has felt fine otherwise.  Some BM's are normal and formed.  Is not sure if anxiety is related to it.  No blood in stool.  No pain.  Just urgency.  Is better today after bad bout of diarrhea yesterday after work.   1. Osteopenia - persisted on DEXA 8/2017. C/W exercise at [facility] 3x/ week. Has not added back Ca++D daily due to GI intolerance of Ca++.   2. Vitamin D deficiency - still off Vitamin D3 2000 I.U.   3. Osteoarthritis, in B hands -  Exam remains c/w OA. Is taking Tylenol in AM, seems to help.  Is making some diet mods that she thinks is helpful for arthritis.   4. Onychomycosis - B great toes.  Did \"Congregational\" application of Vicks vapor rub for several months and did improve.  However, has been less diligent recently and has had some mild resurgence.   5. Atypical skin lesion R shoulder - frozen off and derm, told was SK. Has resolved.     The following portions of the patient's history were reviewed and updated as appropriate: allergies, current medications, past family history, past medical history, past social history, past surgical history and problem list.    Review of Systems   Constitution: Negative for chills, fever, malaise/fatigue, weight gain and weight loss.   HENT: Negative for congestion, hearing loss, hoarse voice, odynophagia and sore throat.    Eyes: Negative for discharge, double vision, pain and redness.        Last eye exam 9/2018; has appt upcoming; wears glasses     Cardiovascular: Negative for chest pain, dyspnea on exertion, " irregular heartbeat, leg swelling, near-syncope, palpitations and syncope.   Respiratory: Negative for cough and shortness of breath.    Endocrine: Negative for polydipsia, polyphagia and polyuria.   Hematologic/Lymphatic: Negative for bleeding problem. Does not bruise/bleed easily.   Skin: Negative for rash and suspicious lesions.        Saw derm in 11/2018; no new lesions     Musculoskeletal: Positive for arthritis and joint pain (hands B). Negative for joint swelling, muscle cramps, muscle weakness and myalgias.   Gastrointestinal: Positive for diarrhea (variable). Negative for constipation, dysphagia, heartburn, nausea and vomiting.   Genitourinary: Negative for dysuria, frequency, hematuria and hesitancy.   Neurological: Negative for dizziness, headaches and light-headedness.   Psychiatric/Behavioral: Negative for depression. The patient is nervous/anxious (variable). The patient does not have insomnia.    Allergic/Immunologic: Negative for environmental allergies and persistent infections.       Patient Care Team:  Allan Daugherty MD as PCP - General (Internal Medicine)  Sosa Garcia DPM as PCP - Claims Attributed    Recent Hospitalizations: No recent hospitalization(s).    Depression Screen:   PHQ-2/PHQ-9 Depression Screening 8/27/2019   Little interest or pleasure in doing things 1   Feeling down, depressed, or hopeless 1   Trouble falling or staying asleep, or sleeping too much 0   Feeling tired or having little energy 0   Poor appetite or overeating 0   Feeling bad about yourself - or that you are a failure or have let yourself or your family down 0   Trouble concentrating on things, such as reading the newspaper or watching television 0   Moving or speaking so slowly that other people could have noticed. Or the opposite - being so fidgety or restless that you have been moving around a lot more than usual 0   Thoughts that you would be better off dead, or of hurting yourself in some way 0   Total  "Score 2   If you checked off any problems, how difficult have these problems made it for you to do your work, take care of things at home, or get along with other people? Not difficult at all       Functional and Cognitive Screening:  Functional & Cognitive Status 8/27/2019   Do you have difficulty preparing food and eating? No   Do you have difficulty bathing yourself, getting dressed or grooming yourself? No   Do you have difficulty using the toilet? No   Do you have difficulty moving around from place to place? No   Do you have trouble with steps or getting out of a bed or a chair? No   Current Diet Well Balanced Diet   Dental Exam Up to date   Eye Exam Up to date   Exercise (times per week) 3 times per week   Current Exercise Activities Include Aerobics   Do you need help using the phone?  No   Are you deaf or do you have serious difficulty hearing?  No   Do you need help with transportation? No   Do you need help shopping? No   Do you need help preparing meals?  No   Do you need help with housework?  No   Do you need help with laundry? No   Do you need help taking your medications? No   Do you need help managing money? No   Do you ever drive or ride in a car without wearing a seat belt? No   Have you felt unusual stress, anger or loneliness in the last month? No   Who do you live with? Alone   If you need help, do you have trouble finding someone available to you? No   Have you been bothered in the last four weeks by sexual problems? No   Do you have difficulty concentrating, remembering or making decisions? No       Does the patient have evidence of cognitive impairment? no    Does not need ASA (and currently is not on it)    Vitals:    08/27/19 1301   BP: 130/80   Pulse: 68   Resp: 12   Temp: 97.6 °F (36.4 °C)   SpO2: 98%   Weight: 57.2 kg (126 lb)   Height: 167.6 cm (66\")   PainSc: 0-No pain       Body mass index is 20.34 kg/m².    Visual Acuity:  No exam data present    Advanced Care Planning:  Patient has " an advance directive - a copy has not been provided. Have asked the patient to send this to us to add to record    Objective   Physical Exam   Constitutional: She is oriented to person, place, and time. She appears well-developed and well-nourished. No distress.   HENT:   Head: Normocephalic and atraumatic.   Right Ear: Hearing and external ear normal.   Left Ear: Hearing, tympanic membrane, external ear and ear canal normal.   Nose: Nose normal.   Mouth/Throat: Uvula is midline, oropharynx is clear and moist and mucous membranes are normal. No oropharyngeal exudate.   SMall cerumen obscures on R   Eyes: Conjunctivae, EOM and lids are normal. Pupils are equal, round, and reactive to light. Right eye exhibits no discharge. Left eye exhibits no discharge. No scleral icterus.   Neck: Trachea normal, normal range of motion and full passive range of motion without pain. Neck supple. Carotid bruit is not present. No thyroid mass and no thyromegaly present.   Cardiovascular: Normal rate, regular rhythm, S1 normal, S2 normal, normal heart sounds and intact distal pulses. Exam reveals no gallop and no friction rub.   No murmur heard.  Pulses:       Radial pulses are 2+ on the right side, and 2+ on the left side.        Dorsalis pedis pulses are 2+ on the right side, and 2+ on the left side.        Posterior tibial pulses are 2+ on the right side, and 2+ on the left side.   Pulmonary/Chest: Effort normal and breath sounds normal. No respiratory distress. She has no wheezes. She has no rhonchi. She has no rales. She exhibits no mass. Right breast exhibits no inverted nipple, no mass, no nipple discharge and no skin change. Left breast exhibits no inverted nipple, no mass, no nipple discharge and no skin change.   Chaperone present     Abdominal: Soft. Normal appearance and bowel sounds are normal. She exhibits no distension and no mass. There is no hepatosplenomegaly. There is no tenderness. There is no rebound and no  guarding.   Musculoskeletal: Normal range of motion. She exhibits no edema.        Right hand: She exhibits deformity (DIP joint hypertrophy). She exhibits normal range of motion, no tenderness and no bony tenderness.        Left hand: She exhibits deformity (DIP joint hypertrophy). She exhibits normal range of motion, no tenderness and no bony tenderness.     Vascular Status -  Her right foot exhibits normal foot vasculature  and no edema. Her left foot exhibits normal foot vasculature  and no edema.  Lymphadenopathy:     She has no cervical adenopathy.     She has no axillary adenopathy.        Right: No inguinal adenopathy present.        Left: No inguinal adenopathy present.   Neurological: She is alert and oriented to person, place, and time. She has normal strength and normal reflexes. She displays no tremor. No cranial nerve deficit or sensory deficit. She exhibits normal muscle tone. Gait normal.   Skin: Skin is warm and dry. No rash noted.   Psychiatric: She has a normal mood and affect. Her behavior is normal. Thought content normal. Cognition and memory are normal.   Vitals reviewed.      Assessment/Plan   Problems Addressed this Visit     Osteopenia    Avitaminosis D    Osteoarthritis of both hands      Other Visit Diagnoses     Healthcare maintenance    -  Primary    Diarrhea, unspecified type                  Diagnoses and all orders for this visit:    Healthcare maintenance    Avitaminosis D    Primary osteoarthritis of both hands    Osteopenia, unspecified location    Diarrhea, unspecified type        Jailene Molina is a 68 y.o. female RTC in yearly AWV, review of medical issues:   Doing well overall but still struggling to adjust to life without mother who passed last year.  1. Osteopenia - persisted on DEXA 8/2017. C/W exercise at [facility] 3x/ week.  Recheck DEXA due, will f/u after completed.   2. Vitamin D deficiency - persists. Restart Vitamin D3 2000 I.U.   3. Osteoarthritis, in B hands -   Exam remains c/w OA. Tylenol in AM for pain control.   4. Cerumen impaction on R, hx of B impaction in past. - dry and crusted, recurrent from last year. Pt to start baby oil drops prior to shower to keep wax soft and moving.   5. Onychomycosis - B great toes.  Improved with Vicks vapor rub for several months. Restart for longer term tx.  6. HM - labs d/w pt; Flu - next season; Tdap/ Pvax/ Prevnar - UTD; Hep A and Shingrix #1 completed 8/2019; C-scope UTD 2013 (-) --> 10 years; Pap - 2015 with Gyn, D/C due to pain and atrophy vaginally, no prior sexual encounters/ low risk;  Mammo (-) 5/2019;   DEXA as above; HepCAb (-) 2014 labs; C/W exercise; Preventative care plan provided to pt at end of visit   --> Loose stools, intermittent - suspcet IBS element. Trial of daily fiber as discussed in past.     Return in about 1 year (around 8/27/2020) for Medicare Wellness.          Current Outpatient Medications:   •  Acetaminophen (TYLENOL ARTHRITIS PAIN PO), Take  by mouth., Disp: , Rfl:   •  Cholecalciferol (VITAMIN D3) 2000 UNITS capsule, Take  by mouth., Disp: , Rfl:   •  methylcellulose, Laxative, (CITRUCEL) 500 MG tablet tablet, Take  by mouth., Disp: , Rfl:   •  Multiple Vitamins-Minerals (CENTRUM SILVER PO), Take  by mouth., Disp: , Rfl:

## 2019-08-27 NOTE — PATIENT INSTRUCTIONS
Medicare Wellness  Personal Prevention Plan of Service     Date of Office Visit:  2019  Encounter Provider:  Allan Daugherty MD  Place of Service:  [facility] INTERNAL MEDICINE  Patient Name: Jailene Molina  :  1950    As part of the Medicare Wellness portion of your visit today, we are providing you with this personalized preventive plan of services (PPPS). This plan is based upon recommendations of the [place] Preventive Services Task Force (USPSTF) and the Advisory Committee on Immunization Practices (ACIP).    This lists the preventive care services that should be considered, and provides dates of when you are due. Items listed as completed are up-to-date and do not require any further intervention.    Health Maintenance   Topic Date Due   • DXA SCAN  2019   • INFLUENZA VACCINE  2019 (Originally 2019)   • ZOSTER VACCINE (2 of 2) 10/11/2019   • TDAP/TD VACCINES (2 - Td) 2020   • MEDICARE ANNUAL WELLNESS  2020   • MAMMOGRAM  2021   • COLONOSCOPY  2023   • HEPATITIS C SCREENING  Completed   • PNEUMOCOCCAL VACCINES (65+ LOW/MEDIUM RISK)  Completed       No orders of the defined types were placed in this encounter.      Return in about 1 year (around 2020) for Medicare Wellness.

## 2019-11-15 PROCEDURE — 90653 IIV ADJUVANT VACCINE IM: CPT | Performed by: INTERNAL MEDICINE

## 2019-11-15 PROCEDURE — G0008 ADMIN INFLUENZA VIRUS VAC: HCPCS | Performed by: INTERNAL MEDICINE

## 2020-01-28 ENCOUNTER — OFFICE VISIT (OUTPATIENT)
Dept: INTERNAL MEDICINE | Facility: CLINIC | Age: 70
End: 2020-01-28

## 2020-01-28 VITALS
OXYGEN SATURATION: 99 % | HEART RATE: 66 BPM | WEIGHT: 127.5 LBS | TEMPERATURE: 97.7 F | SYSTOLIC BLOOD PRESSURE: 120 MMHG | BODY MASS INDEX: 20.49 KG/M2 | HEIGHT: 66 IN | DIASTOLIC BLOOD PRESSURE: 78 MMHG | RESPIRATION RATE: 14 BRPM

## 2020-01-28 DIAGNOSIS — E55.9 AVITAMINOSIS D: ICD-10-CM

## 2020-01-28 DIAGNOSIS — M81.0 AGE-RELATED OSTEOPOROSIS WITHOUT CURRENT PATHOLOGICAL FRACTURE: Primary | ICD-10-CM

## 2020-01-28 PROCEDURE — 99214 OFFICE O/P EST MOD 30 MIN: CPT | Performed by: INTERNAL MEDICINE

## 2020-01-28 RX ORDER — ALENDRONATE SODIUM 70 MG/1
70 TABLET ORAL
Qty: 4 TABLET | Refills: 5 | Status: SHIPPED | OUTPATIENT
Start: 2020-01-28 | End: 2020-08-31

## 2020-01-28 NOTE — PROGRESS NOTES
"Osteoarthritis      HPI  Jailene Molina is a 69 y.o. female RTC in f/u:  Aware of years long hx of osteopenia. Had DEXA in 11/2019 and made appt for f/u. Never been treated with meds for bone density.  Works on feet in retail.  Pt is exercising at [employer] and working at gym but was doing 'low impact' aerobic and stretch class. Will do some walking and biking/ treadmill.  Fairly low impact, 'ya, it is seniors'.  Will take MVI and citrical daily at this time, though not sure if has Vit D extra in it.  Recalls had low Vit D on labs on labs 8/2019.   Is willing to take meds for bones, \"I guess I should if I need to\".     Review of Systems   Skin: Negative for rash and suspicious lesions.   Musculoskeletal: Positive for arthritis, joint pain (in hands, worse recently) and joint swelling (L index finger distally).   Neurological: Negative for focal weakness and weakness.       The following portions of the patient's history were reviewed and updated as appropriate: allergies, current medications, past medical history, past social history and problem list.      Current Outpatient Medications:   •  Acetaminophen (TYLENOL ARTHRITIS PAIN PO), Take  by mouth., Disp: , Rfl:   •  Calcium Citrate (CITRACAL PO), Take  by mouth., Disp: , Rfl:   •  Cholecalciferol (VITAMIN D3) 2000 UNITS capsule, Take  by mouth., Disp: , Rfl:   •  methylcellulose, Laxative, (CITRUCEL) 500 MG tablet tablet, Take  by mouth., Disp: , Rfl:   •  Multiple Vitamins-Minerals (CENTRUM SILVER PO), Take  by mouth., Disp: , Rfl:   •  alendronate (FOSAMAX) 70 MG tablet, Take 1 tablet by mouth Every 7 (Seven) Days., Disp: 4 tablet, Rfl: 5    Vitals:    01/28/20 1424   BP: 120/78   Pulse: 66   Resp: 14   Temp: 97.7 °F (36.5 °C)   SpO2: 99%   Weight: 57.8 kg (127 lb 8 oz)   Height: 167.6 cm (66\")     Body mass index is 20.58 kg/m².      Physical Exam   Constitutional: She is oriented to person, place, and time. She appears well-developed and well-nourished. No " distress.   HENT:   Head: Normocephalic and atraumatic.   Eyes: Pupils are equal, round, and reactive to light.   Neck: Normal range of motion. Neck supple.   Cardiovascular: Normal rate, regular rhythm and normal heart sounds.   Pulmonary/Chest: Effort normal and breath sounds normal. No respiratory distress. She has no wheezes. She has no rales.   Musculoskeletal:        Right hand: She exhibits normal range of motion, no tenderness and no bony tenderness. Normal strength noted.        Left hand: She exhibits tenderness (index DIP joint, mild). She exhibits normal range of motion and no bony tenderness. Normal strength noted.   Neurological: She is alert and oriented to person, place, and time. No cranial nerve deficit. She exhibits normal muscle tone. Gait normal.   Psychiatric: She has a normal mood and affect. Her behavior is normal.   Vitals reviewed.      Assessment/ Plan  Diagnoses and all orders for this visit:    Age-related osteoporosis without current pathological fracture  -     alendronate (FOSAMAX) 70 MG tablet; Take 1 tablet by mouth Every 7 (Seven) Days.    Avitaminosis D    Other orders  -     Calcium Citrate (CITRACAL PO); Take  by mouth.        Return for Next scheduled follow up.      Discussion:    Jailene Molina is a 69 y.o. female RTC in f/u after DEXA 11/2019 with progression in L-spine and hip to osteoporosis in hip.  Pt has relatively low FRAX but I am more concerned about rapid decline in bone density.  I advised pt to start with bisphosp[honates and pt agreeable. D/W pt side effects and risks, including rare osteonecrosis issues.  Will need to start VIt D supplement as had been advised in past and to c/w Ca++ as she is. Will work on more impact exercise at [employer].  Recheck DEXA in 11/2021.

## 2020-02-18 ENCOUNTER — TELEPHONE (OUTPATIENT)
Dept: INTERNAL MEDICINE | Facility: CLINIC | Age: 70
End: 2020-02-18

## 2020-02-18 NOTE — TELEPHONE ENCOUNTER
I need to examine pt for this.  Unusual to have such focal pain as side effect . Please make appt.

## 2020-02-18 NOTE — TELEPHONE ENCOUNTER
Pt came into the office because she's been experiencing left side pain from the waist down. She's currently taking: ALENDRONATE 70MG TABLET TAKEN ONCE A WEEK. Pt has questions about the side effects. Patient went to [facility] pharmacy and asked pharmacist regarding intermittent pain, left side upper inner thigh and groin.  She read the side effects to pharmacist who suggest she reach out to PCP.  Patient also mentions she is going to [facility].  She presented the side effects pamphlet stating medication could involve hip/joint pain.  Please contact patient at [phone] cell  Home [phone]. Patient will be working Wednesday and most likely will not be available until after 6pm. Patient does not work on Thursdays, prefers call to cell phone.

## 2020-02-28 ENCOUNTER — OFFICE VISIT (OUTPATIENT)
Dept: INTERNAL MEDICINE | Facility: CLINIC | Age: 70
End: 2020-02-28

## 2020-02-28 VITALS
BODY MASS INDEX: 19.61 KG/M2 | TEMPERATURE: 97.4 F | OXYGEN SATURATION: 98 % | SYSTOLIC BLOOD PRESSURE: 122 MMHG | WEIGHT: 122 LBS | DIASTOLIC BLOOD PRESSURE: 88 MMHG | HEART RATE: 71 BPM | HEIGHT: 66 IN

## 2020-02-28 DIAGNOSIS — M81.0 AGE-RELATED OSTEOPOROSIS WITHOUT CURRENT PATHOLOGICAL FRACTURE: ICD-10-CM

## 2020-02-28 DIAGNOSIS — M79.652 THIGH PAIN, MUSCULOSKELETAL, LEFT: Primary | ICD-10-CM

## 2020-02-28 PROCEDURE — 99213 OFFICE O/P EST LOW 20 MIN: CPT | Performed by: INTERNAL MEDICINE

## 2020-02-28 NOTE — PROGRESS NOTES
Leg Pain (left )      HPI  Jailene Molina is a 69 y.o. female RTC In f/u after recent start of bisphosphonate (Fosamax). Started and has taken 3 doses. Pt really cannot tell me when pain in L thigh pain started. However, at some point felt like was nearly immobile.  Notes had gone to new [employer] class and noted day after had some stomach muscle pain. However, still really cannot remember when hip pain started. Missed dose last SUnday after called here.  Notes that pain really is coming and going and has 'good days and bad days'. Today is having a good day and really is doing OK.  Pain, when present, is 'thigh pain' and in to groin as well.  Will limp and at times hard to walk. No weakness in leg. No back pain associated.   Pt seems overwhelmed by this and somewhat anxious at visit today. Has her new med with her and all the paperwork from the pharmacy. Feels like this has interrupted her usual routine of work and gym and home  And this is hard for her.     Review of Systems   Constitution: Negative for chills and fever.   Musculoskeletal: Positive for joint pain (L hip) and myalgias (L hip, better today). Negative for back pain, falls, joint swelling, muscle cramps and muscle weakness.   Neurological: Negative for focal weakness.   Psychiatric/Behavioral: The patient is nervous/anxious.        The following portions of the patient's history were reviewed and updated as appropriate: allergies, current medications, past medical history, past social history and problem list.      Current Outpatient Medications:   •  Acetaminophen (TYLENOL ARTHRITIS PAIN PO), Take  by mouth., Disp: , Rfl:   •  alendronate (FOSAMAX) 70 MG tablet, Take 1 tablet by mouth Every 7 (Seven) Days., Disp: 4 tablet, Rfl: 5  •  Calcium Citrate (CITRACAL PO), Take  by mouth., Disp: , Rfl:   •  Cholecalciferol (VITAMIN D3) 2000 UNITS capsule, Take  by mouth., Disp: , Rfl:   •  methylcellulose, Laxative, (CITRUCEL) 500 MG tablet tablet, Take  by  "mouth., Disp: , Rfl:   •  Multiple Vitamins-Minerals (CENTRUM SILVER PO), Take  by mouth., Disp: , Rfl:     Vitals:    02/28/20 1430   BP: 122/88   Pulse: 71   Temp: 97.4 °F (36.3 °C)   SpO2: 98%   Weight: 55.3 kg (122 lb)   Height: 167.6 cm (66\")     Body mass index is 19.69 kg/m².      Physical Exam   Constitutional: She is oriented to person, place, and time. She appears well-developed and well-nourished. No distress.   HENT:   Head: Normocephalic and atraumatic.   Pulmonary/Chest: Effort normal. No respiratory distress.   Musculoskeletal:        Right hip: She exhibits normal range of motion, normal strength, no tenderness, no bony tenderness, no crepitus and no deformity.        Left hip: She exhibits normal range of motion, normal strength, no tenderness, no bony tenderness, no crepitus and no deformity.        Right knee: She exhibits normal range of motion, no swelling and no effusion.        Left knee: She exhibits normal range of motion, no swelling and no effusion.   (-) straight leg raise B     Neurological: She is alert and oriented to person, place, and time. She has normal strength. She displays no tremor. No cranial nerve deficit. She exhibits normal muscle tone. Gait (slight limp on L) abnormal.   Reflex Scores:       Patellar reflexes are 2+ on the right side and 2+ on the left side.       Achilles reflexes are 2+ on the right side and 2+ on the left side.  Psychiatric: Her behavior is normal. Thought content normal. Her mood appears anxious. Her affect is not angry, not blunt, not labile and not inappropriate. She does not exhibit a depressed mood.   Vitals reviewed.      Assessment/ Plan  Diagnoses and all orders for this visit:    Thigh pain, musculoskeletal, left    Age-related osteoporosis without current pathological fracture        Return for Next scheduled follow up.      Discussion:  Jailene Molina is a 69 y.o. female RTC In f/u after recent start of bisphosphonate (Fosamax).  Pt is poor " historian today noting some onset of intermittent L groin and thigh pain sometime after started Fosamax but also around time took a new Pilates class at gym. Pt cannot delineate much of time frame in hx and has really no pain today noting some variability in pain.  P has not taken last 2 doses of Fosamax.  Exam today is benign.  I reassured pt today that I suspect she strained a thigh muscle tendon at her new class and should likely resolve. I reassured her that not likely the Fosamax given then focal and variable nature of the pain. I asked pt to restart her usual exercise routine and see  How she does over next few weeks. If no issues then can rechallenge with Fosamax at that time. Call if any issues.  Reassured overall as pt really seems overwhelmed today so feel taking med out of picture for a few weeks is likely best.      >10/15 minutes was spent in counseling of the following topics:, impressions, treatment options

## 2020-04-02 ENCOUNTER — TELEPHONE (OUTPATIENT)
Dept: INTERNAL MEDICINE | Facility: CLINIC | Age: 70
End: 2020-04-02

## 2020-04-02 NOTE — TELEPHONE ENCOUNTER
Pain has pain in her lower mouth she is thinking due to the new fosamax medication. [facility]. She has since stopped th medication due to the side effects.

## 2020-04-06 NOTE — TELEPHONE ENCOUNTER
I am fine with pt seeing Gyn for second opinion on medication/ DEXA.  I do think that seeing dentist when able will be ideal as well as mouth pain Ddx is certainly not isolated to side effects of bisphosphonate medications.

## 2020-06-01 ENCOUNTER — APPOINTMENT (OUTPATIENT)
Dept: WOMENS IMAGING | Facility: HOSPITAL | Age: 70
End: 2020-06-01

## 2020-06-01 PROCEDURE — 77063 BREAST TOMOSYNTHESIS BI: CPT | Performed by: RADIOLOGY

## 2020-06-01 PROCEDURE — 77067 SCR MAMMO BI INCL CAD: CPT | Performed by: RADIOLOGY

## 2020-07-08 ENCOUNTER — APPOINTMENT (OUTPATIENT)
Dept: WOMENS IMAGING | Facility: HOSPITAL | Age: 70
End: 2020-07-08

## 2020-07-08 PROCEDURE — 77065 DX MAMMO INCL CAD UNI: CPT | Performed by: RADIOLOGY

## 2020-07-21 ENCOUNTER — APPOINTMENT (OUTPATIENT)
Dept: WOMENS IMAGING | Facility: HOSPITAL | Age: 70
End: 2020-07-21

## 2020-07-21 PROCEDURE — 19081 BX BREAST 1ST LESION STRTCTC: CPT | Performed by: RADIOLOGY

## 2020-08-19 DIAGNOSIS — E55.9 AVITAMINOSIS D: ICD-10-CM

## 2020-08-19 DIAGNOSIS — E78.89 LIPIDS ABNORMAL: ICD-10-CM

## 2020-08-19 DIAGNOSIS — Z00.00 HEALTHCARE MAINTENANCE: Primary | ICD-10-CM

## 2020-08-28 LAB
25(OH)D3+25(OH)D2 SERPL-MCNC: 32.9 NG/ML (ref 30–100)
ALBUMIN SERPL-MCNC: 4.3 G/DL (ref 3.5–5.2)
ALBUMIN/GLOB SERPL: 2.4 G/DL
ALP SERPL-CCNC: 58 U/L (ref 39–117)
ALT SERPL-CCNC: 13 U/L (ref 1–33)
APPEARANCE UR: CLEAR
AST SERPL-CCNC: 18 U/L (ref 1–32)
BACTERIA #/AREA URNS HPF: NORMAL /HPF
BASOPHILS # BLD AUTO: 0.05 10*3/MM3 (ref 0–0.2)
BASOPHILS NFR BLD AUTO: 1 % (ref 0–1.5)
BILIRUB SERPL-MCNC: 0.2 MG/DL (ref 0–1.2)
BILIRUB UR QL STRIP: NEGATIVE
BUN SERPL-MCNC: 14 MG/DL (ref 8–23)
BUN/CREAT SERPL: 19.2 (ref 7–25)
CALCIUM SERPL-MCNC: 8.9 MG/DL (ref 8.6–10.5)
CHLORIDE SERPL-SCNC: 102 MMOL/L (ref 98–107)
CHOLEST SERPL-MCNC: 178 MG/DL (ref 0–200)
CO2 SERPL-SCNC: 28.7 MMOL/L (ref 22–29)
COLOR UR: YELLOW
CREAT SERPL-MCNC: 0.73 MG/DL (ref 0.57–1)
EOSINOPHIL # BLD AUTO: 0.11 10*3/MM3 (ref 0–0.4)
EOSINOPHIL NFR BLD AUTO: 2.1 % (ref 0.3–6.2)
EPI CELLS #/AREA URNS HPF: NORMAL /HPF (ref 0–10)
ERYTHROCYTE [DISTWIDTH] IN BLOOD BY AUTOMATED COUNT: 12.7 % (ref 12.3–15.4)
GLOBULIN SER CALC-MCNC: 1.8 GM/DL
GLUCOSE SERPL-MCNC: 81 MG/DL (ref 65–99)
GLUCOSE UR QL: NEGATIVE
HCT VFR BLD AUTO: 37.7 % (ref 34–46.6)
HDLC SERPL-MCNC: 60 MG/DL (ref 40–60)
HGB BLD-MCNC: 13 G/DL (ref 12–15.9)
HGB UR QL STRIP: NEGATIVE
IMM GRANULOCYTES # BLD AUTO: 0.02 10*3/MM3 (ref 0–0.05)
IMM GRANULOCYTES NFR BLD AUTO: 0.4 % (ref 0–0.5)
KETONES UR QL STRIP: NEGATIVE
LDLC SERPL CALC-MCNC: 108 MG/DL (ref 0–100)
LEUKOCYTE ESTERASE UR QL STRIP: NEGATIVE
LYMPHOCYTES # BLD AUTO: 1.52 10*3/MM3 (ref 0.7–3.1)
LYMPHOCYTES NFR BLD AUTO: 29.7 % (ref 19.6–45.3)
MCH RBC QN AUTO: 30.7 PG (ref 26.6–33)
MCHC RBC AUTO-ENTMCNC: 34.5 G/DL (ref 31.5–35.7)
MCV RBC AUTO: 89.1 FL (ref 79–97)
MICRO URNS: NORMAL
MICRO URNS: NORMAL
MONOCYTES # BLD AUTO: 0.63 10*3/MM3 (ref 0.1–0.9)
MONOCYTES NFR BLD AUTO: 12.3 % (ref 5–12)
MUCOUS THREADS URNS QL MICRO: PRESENT /HPF
NEUTROPHILS # BLD AUTO: 2.79 10*3/MM3 (ref 1.7–7)
NEUTROPHILS NFR BLD AUTO: 54.5 % (ref 42.7–76)
NITRITE UR QL STRIP: NEGATIVE
NRBC BLD AUTO-RTO: 0 /100 WBC (ref 0–0.2)
PH UR STRIP: 7 [PH] (ref 5–7.5)
PLATELET # BLD AUTO: 206 10*3/MM3 (ref 140–450)
POTASSIUM SERPL-SCNC: 4.3 MMOL/L (ref 3.5–5.2)
PROT SERPL-MCNC: 6.1 G/DL (ref 6–8.5)
PROT UR QL STRIP: NEGATIVE
RBC # BLD AUTO: 4.23 10*6/MM3 (ref 3.77–5.28)
RBC #/AREA URNS HPF: NORMAL /HPF (ref 0–2)
SODIUM SERPL-SCNC: 138 MMOL/L (ref 136–145)
SP GR UR: 1.02 (ref 1–1.03)
TRIGL SERPL-MCNC: 51 MG/DL (ref 0–150)
URINALYSIS REFLEX: NORMAL
UROBILINOGEN UR STRIP-MCNC: 0.2 MG/DL (ref 0.2–1)
VLDLC SERPL CALC-MCNC: 10.2 MG/DL
WBC # BLD AUTO: 5.12 10*3/MM3 (ref 3.4–10.8)
WBC #/AREA URNS HPF: NORMAL /HPF (ref 0–5)

## 2020-08-31 ENCOUNTER — OFFICE VISIT (OUTPATIENT)
Dept: INTERNAL MEDICINE | Facility: CLINIC | Age: 70
End: 2020-08-31

## 2020-08-31 VITALS
WEIGHT: 117 LBS | OXYGEN SATURATION: 98 % | SYSTOLIC BLOOD PRESSURE: 120 MMHG | HEART RATE: 93 BPM | RESPIRATION RATE: 14 BRPM | HEIGHT: 66 IN | TEMPERATURE: 97.1 F | BODY MASS INDEX: 18.8 KG/M2 | DIASTOLIC BLOOD PRESSURE: 80 MMHG

## 2020-08-31 DIAGNOSIS — M19.042 PRIMARY OSTEOARTHRITIS OF BOTH HANDS: ICD-10-CM

## 2020-08-31 DIAGNOSIS — M19.041 PRIMARY OSTEOARTHRITIS OF BOTH HANDS: ICD-10-CM

## 2020-08-31 DIAGNOSIS — Z00.00 HEALTHCARE MAINTENANCE: Primary | ICD-10-CM

## 2020-08-31 DIAGNOSIS — M81.0 AGE-RELATED OSTEOPOROSIS WITHOUT CURRENT PATHOLOGICAL FRACTURE: ICD-10-CM

## 2020-08-31 DIAGNOSIS — E55.9 AVITAMINOSIS D: ICD-10-CM

## 2020-08-31 DIAGNOSIS — R63.4 WEIGHT LOSS: ICD-10-CM

## 2020-08-31 PROCEDURE — 99214 OFFICE O/P EST MOD 30 MIN: CPT | Performed by: INTERNAL MEDICINE

## 2020-08-31 PROCEDURE — G0439 PPPS, SUBSEQ VISIT: HCPCS | Performed by: INTERNAL MEDICINE

## 2020-08-31 RX ORDER — ACETAMINOPHEN 500 MG
TABLET ORAL
Qty: 30 TABLET | Refills: 0
Start: 2020-08-31

## 2020-08-31 NOTE — PROGRESS NOTES
"Subjective     Chief Complaint   Patient presents with   • Annual Exam     review of medical issues        HPI:  Jailene Molina is a 69 y.o. female RTC in yearly AWV, review of medical issues:   1. Osteopenia - persisted on DEXA 8/2017. Had progression in 2019 and started on Fosamax.  Had L hip and groin pain that also occurred around time of new Pilates class. Stopped med and had pain for about one month after. Resolved. Saw Gyn and given Fosteum to take daily and tolerating well.   2. Abnormal Mammo at Gyn, had 3D at [facility] that showed persistent calcification - had bx on L at site of calcifications and had some bruising after, largely resolved now. Recalls bx in 1990's at L breast as well that was benign. Will go back for annual mammo in one year at [facility]. Has DEXA 4/2020 planned with Gyn.  3. Vitamin D deficiency - off Vitamin D3 2000 I.U. Now that on Fosteum. ON MVI as well at this point.   4. Osteoarthritis, in B hands -  \"It got so bad\".  Desired to see rheum and told needed referral from here.  Desires to see Dr. Ambriz. Using Tylenol and thinks helps some.  Will take in AM will seem to help.         The following portions of the patient's history were reviewed and updated as appropriate: allergies, current medications, past family history, past medical history, past social history, past surgical history and problem list.    Review of Systems   Constitution: Positive for weight loss (noted.  \"Ya, I feel good\". Thinks exercise and diet account for it. Misses meals at work. ). Negative for chills, fever, malaise/fatigue and weight gain.   HENT: Negative for congestion, odynophagia and sore throat.    Eyes: Negative for discharge, double vision, pain and redness.        Last eye exam   Cardiovascular: Negative for chest pain, dyspnea on exertion, irregular heartbeat, near-syncope, palpitations and syncope.   Respiratory: Negative for cough.    Endocrine: Negative for polydipsia, polyphagia " "and polyuria.   Hematologic/Lymphatic: Negative for bleeding problem. Does not bruise/bleed easily.   Skin: Positive for color change (feels like has 'tan and I dont know where it came from\". ) and suspicious lesions (on L shoulder region, in front.  ). Negative for flushing, itching and rash.   Musculoskeletal: Positive for arthritis (hands), joint pain (hands) and stiffness (in AM, can be none to lasting until had cup of coffee). Negative for joint swelling, muscle cramps, muscle weakness and myalgias.   Gastrointestinal: Negative for constipation, diarrhea, dysphagia, heartburn, nausea and vomiting.   Genitourinary: Negative for bladder incontinence, dysuria, frequency, hematuria, hesitancy, incomplete emptying and urgency.   Neurological: Negative for dizziness, headaches and light-headedness.   Psychiatric/Behavioral: Negative for depression. The patient is not nervous/anxious.    Allergic/Immunologic: Negative for environmental allergies and persistent infections.       Patient Care Team:  Allan Daugherty MD as PCP - General (Internal Medicine)  Allan Daugherty MD as PCP - Claims Attributed    Recent Hospitalizations: No recent hospitalization(s).    Depression Screen:   PHQ-2/PHQ-9 Depression Screening 8/31/2020   Little interest or pleasure in doing things 0   Feeling down, depressed, or hopeless 0   Trouble falling or staying asleep, or sleeping too much 0   Feeling tired or having little energy 0   Poor appetite or overeating 0   Feeling bad about yourself - or that you are a failure or have let yourself or your family down 0   Trouble concentrating on things, such as reading the newspaper or watching television 0   Moving or speaking so slowly that other people could have noticed. Or the opposite - being so fidgety or restless that you have been moving around a lot more than usual 0   Thoughts that you would be better off dead, or of hurting yourself in some way 0   Total Score 0   If you checked off any " "problems, how difficult have these problems made it for you to do your work, take care of things at home, or get along with other people? Not difficult at all       Functional and Cognitive Screening:  Functional & Cognitive Status 8/31/2020   Do you have difficulty preparing food and eating? No   Do you have difficulty bathing yourself, getting dressed or grooming yourself? No   Do you have difficulty using the toilet? No   Do you have difficulty moving around from place to place? No   Do you have trouble with steps or getting out of a bed or a chair? No   Current Diet Well Balanced Diet   Dental Exam Up to date   Eye Exam Not up to date   Exercise (times per week) 3 times per week   Current Exercise Activities Include Walking   Do you need help using the phone?  No   Are you deaf or do you have serious difficulty hearing?  No   Do you need help with transportation? No   Do you need help shopping? No   Do you need help preparing meals?  No   Do you need help with housework?  No   Do you need help with laundry? No   Do you need help taking your medications? No   Do you need help managing money? No   Do you ever drive or ride in a car without wearing a seat belt? No   Have you felt unusual stress, anger or loneliness in the last month? No   Who do you live with? Alone   If you need help, do you have trouble finding someone available to you? No   Have you been bothered in the last four weeks by sexual problems? No   Do you have difficulty concentrating, remembering or making decisions? No       Does the patient have evidence of cognitive impairment? no    Does not need ASA (and currently is not on it)    Vitals:    08/31/20 1254   BP: 120/80   Pulse: 93   Resp: 14   Temp: 97.1 °F (36.2 °C)   SpO2: 98%   Weight: 53.1 kg (117 lb)   Height: 167.6 cm (66\")   PainSc:   8       Body mass index is 18.88 kg/m².    Visual Acuity:  No exam data present    Advanced Care Planning:  ACP discussion was held with the patient during " this visit. Patient has an advance directive (not in EMR), copy requested.    Objective   Physical Exam   Constitutional: She is oriented to person, place, and time. She appears well-developed and well-nourished. No distress.   HENT:   Head: Normocephalic and atraumatic.   Right Ear: Hearing, tympanic membrane, external ear and ear canal normal.   Left Ear: Hearing, tympanic membrane, external ear and ear canal normal.   Nose: Nose normal.   Mouth/Throat: Uvula is midline, oropharynx is clear and moist and mucous membranes are normal. No oropharyngeal exudate.   Eyes: Pupils are equal, round, and reactive to light. Conjunctivae, EOM and lids are normal. Right eye exhibits no discharge. Left eye exhibits no discharge. No scleral icterus.   Neck: Trachea normal, normal range of motion and full passive range of motion without pain. Neck supple. Carotid bruit is not present. No thyroid mass and no thyromegaly present.   Cardiovascular: Normal rate, regular rhythm, S1 normal, S2 normal, normal heart sounds and intact distal pulses. Exam reveals no gallop and no friction rub.   No murmur heard.  Pulses:       Radial pulses are 2+ on the right side, and 2+ on the left side.        Dorsalis pedis pulses are 2+ on the right side, and 2+ on the left side.        Posterior tibial pulses are 2+ on the right side, and 2+ on the left side.   Pulmonary/Chest: Effort normal and breath sounds normal. No respiratory distress. She has no wheezes. She has no rhonchi. She has no rales.   Abdominal: Soft. Normal appearance and bowel sounds are normal. She exhibits no distension and no mass. There is no hepatosplenomegaly. There is no tenderness. There is no rebound and no guarding.   Musculoskeletal: Normal range of motion. She exhibits no edema.        Right hand: She exhibits deformity (diffuse DIP and PIP joint hypertrophy). She exhibits normal range of motion, no tenderness and no bony tenderness. Normal strength noted.        Left  hand: She exhibits deformity (Diffuse DIP and PIP joint hypertrophy). She exhibits normal range of motion, no tenderness and no bony tenderness. Normal strength noted.     Vascular Status -  Her right foot exhibits normal foot vasculature  and no edema. Her left foot exhibits normal foot vasculature  and no edema.  Lymphadenopathy:     She has no cervical adenopathy.     She has no axillary adenopathy.        Right: No inguinal adenopathy present.        Left: No inguinal adenopathy present.   Neurological: She is alert and oriented to person, place, and time. She has normal strength and normal reflexes. She displays no tremor. No cranial nerve deficit or sensory deficit. She exhibits normal muscle tone. Gait normal.   Skin: Skin is warm and dry. No rash noted.        Psychiatric: She has a normal mood and affect. Her behavior is normal. Thought content normal. Cognition and memory are normal.   Vitals reviewed.      Assessment/Plan   Problems Addressed this Visit     Age-related osteoporosis without current pathological fracture    Avitaminosis D    Osteoarthritis of both hands    Relevant Medications    acetaminophen (TYLENOL) 500 MG tablet    Other Relevant Orders    Ambulatory Referral to Rheumatology (Completed)      Other Visit Diagnoses     Healthcare maintenance    -  Primary    Weight loss                  Diagnoses and all orders for this visit:    Healthcare maintenance    Avitaminosis D    Age-related osteoporosis without current pathological fracture    Primary osteoarthritis of both hands  -     Ambulatory Referral to Rheumatology  -     acetaminophen (TYLENOL) 500 MG tablet; 2 tabs PO Q 8 hours PRN    Weight loss    Other orders  -     Dietary Management Product (FOSTEUM PLUS PO); Take  by mouth. Take 1 twice daily        Jailene Molina is a 69 y.o. female RTC in yearly AWV, review of medical issues:   1. Osteopenia - persisted on DEXA 8/2017, progression in 2019 and started on Fosamax.  L hip pain  noted and pt D/C'd med. Seen by Gyn and placed on Fosteum daily with plans for repeat DEXA in 4/2020.  Had L hip and groin pain that also occurred around time of new Pilates class. Stopped med and had pain for about one month after. Resolved. Saw Gyn and given Fosteum to take daily and tolerating well. C/W walking. Advised weight stability with increase in calories if needed to maintain weight.   2. Abnormal Mammo at Gyn, had 3D at [facility] that showed persistent calcification - s/p bx on L at site of calcifications, benign. Will request result today for review.   3. Vitamin D deficiency - off Vitamin D3 2000 I.U., on Fosteum. Replete on labs today.  4. Osteoarthritis, in B hands -  I had long d/w pt today about her OA in hands. Pt desires eval at [facility] with Dr. Ambriz. While this is reasonable, I think the likelihood of finding alternate etiology for typical hand OA appearance and hx is low.  I reviewed tx options for OA with pt and emphasized our ability to manage here. Agrees, but desires to see rheum for consult regardless.  Will place referral and hold on imaging and additional labs here. For now, will increase Tylenol to 1000mg q 8 hours PRN.   5. Onychomycosis - B great toes.  Improved with Vicks vapor rub for several months in past. Deferred issue today.   6. HM - labs d/w pt; Flu - this season; Tdap - at pharmacy; Pvax/ Prevnar/ Hep A/ Shingrix - UTD; C-scope UTD 2013 (-) --> 10 years; Pap -  2015 with Gyn, D/C due to pain and atrophy vaginally, no prior sexual encounters/ low risk;  Mammo as above; DEXA planned 4/2020 with Gyn; HepCAb (-) 2014 labs; C/W exercise; Preventative care plan provided to pt at end of visit      Return in about 1 year (around 8/31/2021) for Annual physical.          Current Outpatient Medications:   •  Dietary Management Product (FOSTEUM PLUS PO), Take  by mouth. Take 1 twice daily, Disp: , Rfl:   •  methylcellulose, Laxative, (CITRUCEL) 500 MG tablet tablet, Take  by  mouth., Disp: , Rfl:   •  acetaminophen (TYLENOL) 500 MG tablet, 2 tabs PO Q 8 hours PRN, Disp: 30 tablet, Rfl: 0

## 2020-08-31 NOTE — PATIENT INSTRUCTIONS
Tdap due, complete at pharmacy        Medicare Wellness  Personal Prevention Plan of Service     Date of Office Visit:  2020  Encounter Provider:  Allan Daugherty MD  Place of Service:  [facility] INTERNAL MEDICINE  Patient Name: Jailene Molina  :  1950    As part of the Medicare Wellness portion of your visit today, we are providing you with this personalized preventive plan of services (PPPS). This plan is based upon recommendations of the [place] Preventive Services Task Force (USPSTF) and the Advisory Committee on Immunization Practices (ACIP).    This lists the preventive care services that should be considered, and provides dates of when you are due. Items listed as completed are up-to-date and do not require any further intervention.    Health Maintenance   Topic Date Due   • TDAP/TD VACCINES (2 - Td) 2020   • INFLUENZA VACCINE  2020   • MAMMOGRAM  2021   • MEDICARE ANNUAL WELLNESS  2021   • DXA SCAN  2021   • COLONOSCOPY  2023   • HEPATITIS C SCREENING  Completed   • Pneumococcal Vaccine Once at 65 Years Old  Completed   • ZOSTER VACCINE  Completed       Orders Placed This Encounter   Procedures   • Ambulatory Referral to Rheumatology     Referral Priority:   Routine     Referral Type:   Consultation     Referral Reason:   Specialty Services Required     Referred to Provider:   Malissa Ambriz MD     Requested Specialty:   Rheumatology     Number of Visits Requested:   1       Return in about 1 year (around 2021) for Annual physical.

## 2020-10-27 ENCOUNTER — FLU SHOT (OUTPATIENT)
Dept: INTERNAL MEDICINE | Facility: CLINIC | Age: 70
End: 2020-10-27

## 2020-10-27 ENCOUNTER — HOSPITAL ENCOUNTER (OUTPATIENT)
Dept: GENERAL RADIOLOGY | Facility: HOSPITAL | Age: 70
Discharge: HOME | End: 2020-10-27
Admitting: INTERNAL MEDICINE

## 2020-10-27 DIAGNOSIS — Z23 NEED FOR IMMUNIZATION AGAINST INFLUENZA: Primary | ICD-10-CM

## 2020-10-27 DIAGNOSIS — R52 PAIN: ICD-10-CM

## 2020-10-27 DIAGNOSIS — M15.1 HEBERDEN'S NODES OF BOTH HANDS: ICD-10-CM

## 2020-10-27 PROCEDURE — G0008 ADMIN INFLUENZA VIRUS VAC: HCPCS | Performed by: INTERNAL MEDICINE

## 2020-10-27 PROCEDURE — 73130 X-RAY EXAM OF HAND: CPT

## 2020-10-27 PROCEDURE — 90694 VACC AIIV4 NO PRSRV 0.5ML IM: CPT | Performed by: INTERNAL MEDICINE

## 2021-01-30 ENCOUNTER — IMMUNIZATION (OUTPATIENT)
Dept: VACCINE CLINIC | Facility: HOSPITAL | Age: 71
End: 2021-01-30

## 2021-01-30 PROCEDURE — 91300 HC SARSCOV02 VAC 30MCG/0.3ML IM: CPT | Performed by: INTERNAL MEDICINE

## 2021-01-30 PROCEDURE — 0001A: CPT | Performed by: INTERNAL MEDICINE

## 2021-02-20 ENCOUNTER — IMMUNIZATION (OUTPATIENT)
Dept: VACCINE CLINIC | Facility: HOSPITAL | Age: 71
End: 2021-02-20

## 2021-02-20 PROCEDURE — 91300 HC SARSCOV02 VAC 30MCG/0.3ML IM: CPT | Performed by: INTERNAL MEDICINE

## 2021-02-20 PROCEDURE — 0002A: CPT | Performed by: INTERNAL MEDICINE

## 2021-05-03 ENCOUNTER — OFFICE VISIT (OUTPATIENT)
Dept: INTERNAL MEDICINE | Facility: CLINIC | Age: 71
End: 2021-05-03

## 2021-05-03 VITALS
OXYGEN SATURATION: 94 % | BODY MASS INDEX: 19.29 KG/M2 | HEART RATE: 68 BPM | SYSTOLIC BLOOD PRESSURE: 120 MMHG | HEIGHT: 66 IN | TEMPERATURE: 97.3 F | DIASTOLIC BLOOD PRESSURE: 86 MMHG | WEIGHT: 120 LBS

## 2021-05-03 DIAGNOSIS — M54.10 RADICULAR PAIN OF RIGHT LOWER EXTREMITY: ICD-10-CM

## 2021-05-03 DIAGNOSIS — M54.41 ACUTE RIGHT-SIDED LOW BACK PAIN WITH RIGHT-SIDED SCIATICA: Primary | ICD-10-CM

## 2021-05-03 DIAGNOSIS — M15.9 PRIMARY OSTEOARTHRITIS INVOLVING MULTIPLE JOINTS: ICD-10-CM

## 2021-05-03 DIAGNOSIS — M51.36 DDD (DEGENERATIVE DISC DISEASE), LUMBAR: ICD-10-CM

## 2021-05-03 PROCEDURE — 99214 OFFICE O/P EST MOD 30 MIN: CPT | Performed by: INTERNAL MEDICINE

## 2021-05-03 RX ORDER — METHYLPREDNISOLONE 4 MG/1
TABLET ORAL
Qty: 21 EACH | Refills: 0 | Status: SHIPPED | OUTPATIENT
Start: 2021-05-03 | End: 2021-05-28

## 2021-05-03 RX ORDER — CELECOXIB 200 MG/1
CAPSULE ORAL
COMMUNITY
Start: 2021-04-22 | End: 2021-11-10 | Stop reason: SDUPTHER

## 2021-05-03 NOTE — PROGRESS NOTES
"trouble walking (right leg pain)      HPI  Jailene Molina is a 70 y.o. female RTC In acute care:   About 2 weeks ago was out and about and went to gym.  Got on treadmill and felt OK. However, that evening notes 'felt out of sorts, it was not pain or anything'.  Next day got out of bed 'could not walk, it was so painful'.  Had pain in R low back at upper buttocks. Started doing some stretches and tried to grade walking.  Started using some Tylenol for pain control.  Pain went down whole leg. Bearing any weight was painful.  Sharp pain at times down leg. No burning not tingling noted. \"Walking crooked\". However in last few days 'feels kind of numb' in the R leg.    No issues with this prior to last few weeks.     Review of Systems   Constitutional: Negative for chills and fever.   Skin: Negative for rash.   Musculoskeletal: Positive for arthritis and back pain. Negative for falls, muscle weakness and myalgias.   Neurological: Positive for numbness (R leg) and paresthesias. Negative for focal weakness.       The following portions of the patient's history were reviewed and updated as appropriate: allergies, current medications, past medical history, past social history and problem list.      Current Outpatient Medications:   •  acetaminophen (TYLENOL) 500 MG tablet, 2 tabs PO Q 8 hours PRN, Disp: 30 tablet, Rfl: 0  •  celecoxib (CeleBREX) 200 MG capsule, TAKE 1 CAPSULE BY MOUTH DAILY WITH FOOD, Disp: , Rfl:   •  Dietary Management Product (FOSTEUM PLUS PO), Take  by mouth. Take 1 twice daily, Disp: , Rfl:   •  methylcellulose, Laxative, (CITRUCEL) 500 MG tablet tablet, Take  by mouth., Disp: , Rfl:   •  methylPREDNISolone (MEDROL) 4 MG dose pack, Take as directed on package instructions., Disp: 21 each, Rfl: 0    Vitals:    05/03/21 1451   BP: 120/86   Pulse: 68   Temp: 97.3 °F (36.3 °C)   SpO2: 94%   Weight: 54.4 kg (120 lb)   Height: 167.6 cm (66\")     Body mass index is 19.37 kg/m².      Physical Exam  Vitals " reviewed.   Constitutional:       General: She is not in acute distress.     Appearance: She is well-developed. She is not ill-appearing or toxic-appearing.   HENT:      Head: Normocephalic.   Eyes:      General: No scleral icterus.  Pulmonary:      Effort: Pulmonary effort is normal. No respiratory distress.   Musculoskeletal:      Lumbar back: No swelling, deformity, lacerations, tenderness or bony tenderness. Normal range of motion. Negative right straight leg raise test and negative left straight leg raise test.      Right hip: No tenderness, bony tenderness or crepitus. Normal range of motion. Decreased strength (4+/5 resisted flexion on R).      Left hip: No tenderness, bony tenderness or crepitus. Normal range of motion. Normal strength.      Right knee: Normal range of motion.      Left knee: Normal range of motion.      Right lower leg: No edema.      Left lower leg: No edema.   Skin:     Findings: No rash (over back).   Neurological:      Mental Status: She is alert and oriented to person, place, and time.      Motor: Weakness (4+/5 proximal flexion R leg) present. No abnormal muscle tone.      Gait: Gait abnormal (limp on R, marked; no cane used).      Deep Tendon Reflexes:      Reflex Scores:       Patellar reflexes are 2+ on the right side and 2+ on the left side.       Achilles reflexes are 2+ on the right side and 2+ on the left side.  Psychiatric:         Behavior: Behavior normal.         Thought Content: Thought content normal.       XR lumbar: R low back pain with radicular sx; No prior for comparison  No fracture  Normal alignment  Mild diffuse DDD  Facet joints intact      Assessment/ Plan  Diagnoses and all orders for this visit:    Acute right-sided low back pain with right-sided sciatica  -     methylPREDNISolone (MEDROL) 4 MG dose pack; Take as directed on package instructions.  -     MRI Lumbar Spine Without Contrast; Future    Radicular pain of right lower extremity  -      methylPREDNISolone (MEDROL) 4 MG dose pack; Take as directed on package instructions.  -     MRI Lumbar Spine Without Contrast; Future    DDD (degenerative disc disease), lumbar  -     methylPREDNISolone (MEDROL) 4 MG dose pack; Take as directed on package instructions.  -     MRI Lumbar Spine Without Contrast; Future    Primary osteoarthritis involving multiple joints    Other orders  -     celecoxib (CeleBREX) 200 MG capsule; TAKE 1 CAPSULE BY MOUTH DAILY WITH FOOD        Return for Next scheduled follow up.      Discussion:  Jailene Molina is a 70 y.o. female with OA and osteoporosis with prior marked pain reaction to bisphosphonate trial RTC in acute care with ~2 weeks of sudden onset R low back pain with radicular sx into initial B, then R leg.  Progressed to numbness in R leg. Exam with noted 4+/5 flexion on R proximally, otherwise intact. XR with mild DDD noted in lumbar spine.  I d/w pt concern for disc herniation here. Will start Medrol dose pack today. C/W daily oral NSAID as she is.  Will move to MRI to eval soft tissues. OK Tylenol PRN pain.  D/W pt no other NSAIDs with her daily Celebrex. Knows to call if sx progress, progressive weakness/ pain.     RTC as planned.

## 2021-05-14 ENCOUNTER — TELEPHONE (OUTPATIENT)
Dept: INTERNAL MEDICINE | Facility: CLINIC | Age: 71
End: 2021-05-14

## 2021-05-14 NOTE — TELEPHONE ENCOUNTER
PATIENT CALLED IN STATING SHE WAS ABLE TO SCHEDULE AN APPT FOR THE MRI ON JUNE 5TH.    PATIENT WANTS TO KNOW IF THE DOCTOR KNOWS OF ANOTHER IMAGING FACILITY IN [place] WHERE SHE CAN GET A SOONER APPOINTMENT?    BEST CALL BACK # [phone]

## 2021-05-27 ENCOUNTER — TELEPHONE (OUTPATIENT)
Dept: INTERNAL MEDICINE | Facility: CLINIC | Age: 71
End: 2021-05-27

## 2021-05-27 DIAGNOSIS — M48.061 SPINAL STENOSIS AT L4-L5 LEVEL: Primary | ICD-10-CM

## 2021-05-27 DIAGNOSIS — M48.061 FORAMINAL STENOSIS OF LUMBAR REGION: ICD-10-CM

## 2021-05-27 DIAGNOSIS — M51.36 DDD (DEGENERATIVE DISC DISEASE), LUMBAR: ICD-10-CM

## 2021-05-27 NOTE — TELEPHONE ENCOUNTER
Caller: Tracy Jailene GEORGE    Relationship: Self    Best call back number: [phone]    Caller requesting test results: PATIENT    What test was performed: MRI    When was the test performed: 05/21/21    Where was the test performed: [facility]    Additional notes: PATIENT IS CALLING TO REQUEST A CALL WITH THE RESULTS OF MRI.      PLEASE ADVISE.

## 2021-05-28 ENCOUNTER — OFFICE VISIT (OUTPATIENT)
Dept: INTERNAL MEDICINE | Facility: CLINIC | Age: 71
End: 2021-05-28

## 2021-05-28 VITALS
RESPIRATION RATE: 18 BRPM | SYSTOLIC BLOOD PRESSURE: 126 MMHG | TEMPERATURE: 97.2 F | WEIGHT: 120.2 LBS | OXYGEN SATURATION: 97 % | DIASTOLIC BLOOD PRESSURE: 64 MMHG | HEIGHT: 66 IN | BODY MASS INDEX: 19.32 KG/M2

## 2021-05-28 DIAGNOSIS — M48.062 SPINAL STENOSIS, LUMBAR REGION WITH NEUROGENIC CLAUDICATION: Primary | ICD-10-CM

## 2021-05-28 DIAGNOSIS — M51.36 DDD (DEGENERATIVE DISC DISEASE), LUMBAR: ICD-10-CM

## 2021-05-28 PROCEDURE — 99213 OFFICE O/P EST LOW 20 MIN: CPT | Performed by: INTERNAL MEDICINE

## 2021-05-28 NOTE — PROGRESS NOTES
"Back Pain (fup MRI Lumbar pain Rt leg pain ) and Leg Pain      HPI  Jailene Molina is a 70 y.o. female RTC in acute care:   Seen here on 5/3/21 for acute low back pain and B radicular pain.  Notes that has stayed active.  Did take steroids and not sure if helped or not.  \"I have good days and bad days\".  Still at '[facility]' and some 'days I have difficulty'.  Feeling better after exercise.  Is still doing ice pack for about 20 minutes.  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   Still has pain into R leg at that time.  Is back to getting up and down stairs.    [facility] rheumatology this AM. Had some blood work done. Is on Celebrex from rheum, taking this once daily.     Review of Systems   Musculoskeletal: Positive for arthritis, back pain and joint pain (hands). Negative for falls.   Neurological: Positive for numbness (variable in legs and toes.  None today. ). Negative for focal weakness, paresthesias and weakness.       The following portions of the patient's history were reviewed and updated as appropriate: allergies, current medications, past medical history, past social history and problem list.      Current Outpatient Medications:   •  acetaminophen (TYLENOL) 500 MG tablet, 2 tabs PO Q 8 hours PRN, Disp: 30 tablet, Rfl: 0  •  celecoxib (CeleBREX) 200 MG capsule, TAKE 1 CAPSULE BY MOUTH DAILY WITH FOOD, Disp: , Rfl:   •  Dietary Management Product (FOSTEUM PLUS PO), Take  by mouth. Take 1 twice daily, Disp: , Rfl:   •  methylcellulose, Laxative, (CITRUCEL) 500 MG tablet tablet, Take  by mouth., Disp: , Rfl:     Vitals:    05/28/21 1601   BP: 126/64   BP Location: Left arm   Patient Position: Sitting   Resp: 18   Temp: 97.2 °F (36.2 °C)   TempSrc: Temporal   SpO2: 97%   Weight: 54.5 kg (120 lb 3.2 oz)   Height: 167.6 cm (66\")     Body mass index is 19.4 kg/m².      Physical Exam  Vitals reviewed.   Constitutional:       General: She is not in acute distress.     Appearance: She is well-developed. " She is not ill-appearing or toxic-appearing.   HENT:      Head: Normocephalic.   Eyes:      General: No scleral icterus.  Pulmonary:      Effort: Pulmonary effort is normal. No respiratory distress.   Abdominal:      General: Abdomen is flat. There is no distension.      Palpations: Abdomen is soft.      Tenderness: There is no abdominal tenderness.   Musculoskeletal:      Lumbar back: No swelling, deformity or tenderness. Normal range of motion. Negative right straight leg raise test and negative left straight leg raise test.      Right hip: Normal range of motion. Normal strength.      Left hip: Normal range of motion. Normal strength.      Right lower leg: No edema.      Left lower leg: No edema.      Right foot: No swelling.      Left foot: No swelling.   Neurological:      Mental Status: She is alert and oriented to person, place, and time.      Motor: No weakness, atrophy or abnormal muscle tone.      Gait: Gait normal.      Deep Tendon Reflexes:      Reflex Scores:       Patellar reflexes are 2+ on the right side and 2+ on the left side.       Achilles reflexes are 2+ on the right side and 2+ on the left side.  Psychiatric:         Behavior: Behavior normal.         Thought Content: Thought content normal.         Assessment/ Plan  Diagnoses and all orders for this visit:    Spinal stenosis, lumbar region with neurogenic claudication  -     Ambulatory Referral to Physical Therapy Evaluate and treat    DDD (degenerative disc disease), lumbar  -     Ambulatory Referral to Physical Therapy Evaluate and treat        Return for Next scheduled follow up.      Discussion:  Jailene Molina is a 70 y.o. female RTC in acute care to review MRI result from recent lumbar spine MRI.   Pt has new dx of DDD (severe at L4-L5) and mod- severe spinal stenosis at L4-L5, less severe at adjacent levels.  Pt is feeling better after steroid course but has intermittnet days of altered gait and leg pain/ numbness.  5/5 BLE strength on  exam today.   Will ask Nsgy to eval.  Will proceed with PT as well as will likely need long term regardless of Nsgy indications.  Celebrex as she is per rheum for hand OA.  Reviewed MRI in detail with pt and explained in plain terms. Pt voiced understanding of results after our discussion.     Total time of visit ~17 minutes.

## 2021-06-02 ENCOUNTER — APPOINTMENT (OUTPATIENT)
Dept: WOMENS IMAGING | Facility: HOSPITAL | Age: 71
End: 2021-06-02

## 2021-06-02 PROCEDURE — 77063 BREAST TOMOSYNTHESIS BI: CPT | Performed by: RADIOLOGY

## 2021-06-02 PROCEDURE — 77067 SCR MAMMO BI INCL CAD: CPT | Performed by: RADIOLOGY

## 2021-06-05 ENCOUNTER — APPOINTMENT (OUTPATIENT)
Dept: MRI IMAGING | Facility: HOSPITAL | Age: 71
End: 2021-06-05

## 2021-06-07 ENCOUNTER — TELEPHONE (OUTPATIENT)
Dept: INTERNAL MEDICINE | Facility: CLINIC | Age: 71
End: 2021-06-07

## 2021-06-07 NOTE — TELEPHONE ENCOUNTER
Called and spoke with [facility] office to see if I can schedule patient a sooner appointment with a APRN. Office stated that the APRN schedules open monthly and they are already full. There are no sooner appointments. Advised me for patient to keep the appointment with Dr. Newell. Spoke with patient and advised patient to keep appointment.

## 2021-06-07 NOTE — TELEPHONE ENCOUNTER
Please have office call their office and see if can get in to see APRN sooner than July. Pt is in significant pain.  Notify pt if can get sooner appt.

## 2021-06-07 NOTE — TELEPHONE ENCOUNTER
Patient states she can't get in with Dr. Santana until July. Is there anyone else you can refer. Patient states she can't wait that long. Please advise

## 2021-06-11 ENCOUNTER — TELEPHONE (OUTPATIENT)
Dept: INTERNAL MEDICINE | Facility: CLINIC | Age: 71
End: 2021-06-11

## 2021-06-11 DIAGNOSIS — M48.061 SPINAL STENOSIS AT L4-L5 LEVEL: ICD-10-CM

## 2021-06-11 DIAGNOSIS — M51.36 DDD (DEGENERATIVE DISC DISEASE), LUMBAR: Primary | ICD-10-CM

## 2021-06-11 DIAGNOSIS — M47.816 OSTEOARTHRITIS OF LUMBAR SPINE, UNSPECIFIED SPINAL OSTEOARTHRITIS COMPLICATION STATUS: ICD-10-CM

## 2021-06-11 DIAGNOSIS — M48.061 FORAMINAL STENOSIS OF LUMBAR REGION: ICD-10-CM

## 2021-06-11 NOTE — TELEPHONE ENCOUNTER
DR HUERTAS'S PATIENT     IS THE CORRECT DIAGNOSIS    LUMBAR  WO CONTRAST  DEGEN L4, 5       PLEASE PROVIDE PATIENT WITH THE RIGHT DIAGNOSIS FOR SPINE   --[facility] HAS APPT WITH   DR. ALENA PELLETIER 2 IDRIS OCHOA   APPT ON 7-6-21        PLEASE CALL BACK WITH DIAGNOSIS CODE   TracyJailene DORIS (Self) [phone] (M)     Jailene Molina (Self) [phone] (H)

## 2021-06-21 ENCOUNTER — HOSPITAL ENCOUNTER (OUTPATIENT)
Dept: PHYSICAL THERAPY | Facility: HOSPITAL | Age: 71
Setting detail: THERAPIES SERIES
Discharge: HOME | End: 2021-06-21

## 2021-06-21 DIAGNOSIS — G89.29 CHRONIC RIGHT-SIDED LOW BACK PAIN WITH RIGHT-SIDED SCIATICA: Primary | ICD-10-CM

## 2021-06-21 DIAGNOSIS — M54.41 CHRONIC RIGHT-SIDED LOW BACK PAIN WITH RIGHT-SIDED SCIATICA: Primary | ICD-10-CM

## 2021-06-21 PROCEDURE — 97161 PT EVAL LOW COMPLEX 20 MIN: CPT

## 2021-06-21 PROCEDURE — 97110 THERAPEUTIC EXERCISES: CPT

## 2021-06-21 NOTE — THERAPY EVALUATION
"    Outpatient Physical Therapy Ortho Initial Evaluation  [facility]     Patient Name: Jailene Molina  : 1950  MRN: [id number]  Today's Date: 2021      Visit Date: 2021    Patient Active Problem List   Diagnosis   • Age-related osteoporosis without current pathological fracture   • Avitaminosis D   • Atrophy of vagina   • Osteoarthritis of both hands   • Onychomycosis of toenail   • Anxiety        Past Medical History:   Diagnosis Date   • Atrophy of vagina 2016    Description: ntoed at Gyn    • Avitaminosis D 2016   • Cataracts, bilateral     s/p removal   • Detached retina    • Grief reaction with prolonged bereavement 2018   • Osteoarthritis of both hands 2016    With AM stiffness < 30 minutes; noted DIP joint hypertrophy   • Osteopenia 2016    Description: mild at hip; artificially high FRAX in ; urine NTX borderline elevated. No meds started. Repeat DEXA in 2017        Past Surgical History:   Procedure Laterality Date   • BREAST BIOPSY      benign   • CATARACT EXTRACTION Bilateral        Visit Dx:     ICD-10-CM ICD-9-CM   1. Chronic right-sided low back pain with right-sided sciatica  M54.41 724.2    G89.29 724.3     338.29         Patient History     Row Name 21 1200             History    Chief Complaint  Pain  -CA      Type of Pain  Back pain;Lower Extremity / Leg  -CA      Brief Description of Current Complaint  Jailene Molina is a 70 y.o. female who presents today with low back pain, radiating down R LE. Pain began around end of April. 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 Pt reports since that time pain has improve somewhat. Has MRI remarkable for moderate to severe canal stenosis at L4-5 and moderate foraminal stenosis. Diffuse DDD at L-spine. Works out at [employer], and reports had difficulty for a while working out, but has been able to resume walking " around gym. She reports her job as a  at [employer] had required her to  one place for long periods of time. Pt is no longer working there and pain has improved tremendously since that time. Jailene Molina reports difficulty/increased pain with standing at work. Pain relieving factors include ice pack, since she has been off work. PMH grossly unremarkable. Pt has neuro appt scheduled with Sunil SANABRIA at beginning of July.   -CA      What clinical tests have you had for this problem?  MRI  -CA      Results of Clinical Tests  see Epic  -CA         Pain     Pain Location  Back;Leg  -CA      Pain at Present  0  -CA      Pain at Best  0  -CA      Pain at Worst  1  -CA      Pain Frequency  Rarely  -CA         Daily Activities    Primary Language  English  -CA      Are you able to read  Yes  -CA      Are you able to write  Yes  -CA      Pt Participated in POC and Goals  Yes  -CA         Safety    Are you being hurt, hit, or frightened by anyone at home or in your life?  No  -CA      Are you being neglected by a caregiver  No  -CA        User Key  (r) = Recorded By, (t) = Taken By, (c) = Cosigned By    Initials Name Provider Type    CA Gwendolyn Onofre, PT Physical Therapist          PT Ortho     Row Name 06/21/21 1200       Neural Tension Signs- Lower Quarter Clearing    Slump  Right:;Positive;Left:;Negative  -CA       Sensory Screen for Light Touch- Lower Quarter Clearing    L2 (anterior mid thigh)  Intact  -CA    L3 (distal anterior thigh)  Intact  -CA    L4 (medial lower leg/foot)  Intact  -CA    L5 (lateral lower leg/great toe)  Intact  -CA    S1 (bottom of foot)  Intact  -CA       Myotomal Screen- Lower Quarter Clearing    Hip flexion (L2)  Bilateral:;4- (Good -)  -CA    Knee extension (L3)  Bilateral:;5 (Normal)  -CA    Ankle DF (L4)  Bilateral:;5 (Normal)  -CA    Knee flexion (S2)  Bilateral:;4+ (Good +)  -CA       Lumbar ROM Screen- Lower Quarter Clearing    Lumbar Flexion  Normal  -CA    Lumbar  Extension  Normal  -CA    Lumbar Rotation  Normal  -CA       Hip/Thigh Palpation    Gluteus Medius  Right:;Tender;Guarded/taut  -CA    Piriformis  Right:;Tender;Guarded/taut  -CA       General ROM    RT Lower Ext  Rt Hip Internal Rotation;Rt Hip External Rotation  -CA    LT Lower Ext  Lt Hip External Rotation;Lt Hip Internal Rotation  -CA       Right Lower Ext    Rt Hip External Rotation PROM  WNL  -CA    Rt Hip Internal Rotation PROM  slight end range limitation  -CA       Left Lower Ext    Lt Hip External Rotation PROM  WNL  -CA    Lt Hip Internal Rotation PROM  WNL  -CA       MMT (Manual Muscle Testing)    Rt Lower Ext  Rt Hip ABduction  -CA    Lt Lower Ext  Lt Hip ABduction  -CA       MMT Right Lower Ext    Rt Hip ABduction MMT, Gross Movement  (3/5) fair  -CA       MMT Left Lower Ext    Lt Hip ABduction MMT, Gross Movement  (3/5) fair  -CA       Sensation    Sensation WNL?  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  -CA     STG 1 Progress  New  -CA     STG 2  Patient will verbalized good understanding of chronic LBP management and importance of switching positions at work/not standing for  entire shift/allowing herself to take seated rest breaks in order to return to work without increase in pain.  -CA     STG 2 Progress  New  -CA        Long Term Goals    LTG Date to Achieve  07/19/21  -CA     LTG 1  Patient will be independent with education for symptom management and advanced HEP to decrease LBP pain.  -CA     LTG 1 Progress  New  -CA     LTG 2  Patient will report return to [employer] fitness classes with </=2/10 LBP  -CA     LTG 2 Progress  New  -CA     LTG 3  Pt will report return to work/work one full shift with </=2/10 LBP and able to manage pain by taking adequate seated rest breaks throughout shift.  -CA     LTG 3 Progress  New  -CA     LTG 4  Patient will improve B hip strength to at least 4-/5 to improve ability to stand for longer periods of time without pain.  -CA     LTG 4 Progress  New  -CA       User Key  (r) = Recorded By, (t) = Taken By, (c) = Cosigned By    Initials Name Provider Type    Gwendolyn Anne, PT Physical Therapist          PT Assessment/Plan     Row Name 06/21/21 1200          PT Assessment    Functional Limitations  Limitations in community activities;Performance in leisure activities;Performance in work activities  -CA     Impairments  Range of motion;Poor body mechanics;Pain;Muscle strength  -CA     Assessment Comments  Jailene Molina is a 70 y.o. female referred to physical therapy for LBP/R LE pain. MRI remarkable for L4-5 canal stenosis, foraminal stenosis, and diffuse DDD of L-spine. She presents with a stable clinical presentation, along with no remarkable comorbidities and personal factors of job that previoiusly required her to stand for long periods of time, and may return to that job in near future that may impact her progress in the plan of care. Pt presents today with (+) R slump test and significant B hip and core weakness . Her signs and symptoms are consistent with referring diagnosis. The previous impairments limit her ability to stand at work for  long periods of time and workout regularly at [employer]. Pt will benefit from skilled PT to address the previous impairments and return to PLOF.  -CA     Please refer to paper survey for additional self-reported information  Yes  -CA     Rehab Potential  Excellent  -CA     Patient/caregiver participated in establishment of treatment plan and goals  Yes  -CA     Patient would benefit from skilled therapy intervention  Yes  -CA        PT Plan    PT Frequency  1x/week  -CA     Predicted Duration of Therapy Intervention (PT)  1-3 more visits  -CA     Planned CPT's?  PT EVAL LOW COMPLEXITY: 52827;PT RE-EVAL: 80607;PT THER PROC EA 15 MIN: 44503;PT THER ACT EA 15 MIN: 12382;PT MANUAL THERAPY EA 15 MIN: 51329;PT NEUROMUSC RE-EDUCATION EA 15 MIN: 67701;PT GAIT TRAINING EA 15 MIN: 01026;PT SELF CARE/HOME MGMT/TRAIN EA 15: 73712;PT HOT OR COLD PACK TREAT MCARE  -CA     PT Plan Comments  next visit: nu step warm up, review HEP, add s/l clam, side steps, standing hip abd, AR press, TA with march. If pain well managed review education topics from last visit and return to appropriate gym program then likely D/c in 1-2 more visits.  -CA       User Key  (r) = Recorded By, (t) = Taken By, (c) = Cosigned By    Initials Name Provider Type    CA Gwendolyn Onofre, PT Physical Therapist            OP Exercises     Row Name 06/21/21 1200             Subjective Pain    Able to rate subjective pain?  yes  -CA      Pre-Treatment Pain Level  0  -CA      Post-Treatment Pain Level  0  -CA         Total Minutes    97680 - PT Therapeutic Exercise Minutes  15  -CA         Exercise 1    Exercise Name 1  sciatic n glide  -CA      Cueing 1  Verbal  -CA      Reps 1  15 R  -CA         Exercise 2    Exercise Name 2  LTR  -CA      Cueing 2  Verbal  -CA      Reps 2  10 b  -CA      Time 2  3 sec  -CA         Exercise 3    Exercise Name 3  B piriformis str  -CA      Cueing 3  Verbal  -CA      Reps 3  3   -CA      Time 3  20 sec  -CA         Exercise 4     Exercise Name 4  bridge  -CA      Cueing 4  Verbal  -CA      Sets 4  1  -CA      Reps 4  10  -CA      Additional Comments  cues for TA  -CA         Exercise 5    Exercise Name 5  HL hip abd  -CA      Cueing 5  Verbal  -CA      Sets 5  2  -CA      Reps 5  10  -CA      Time 5  Rtb  -CA        User Key  (r) = Recorded By, (t) = Taken By, (c) = Cosigned By    Initials Name Provider Type    Gwendoyln Anne, PT Physical Therapist                        Outcome Measure Options: Modifed Owestry  Modified Oswestry  Modified Oswestry Score/Comments: 12% disability      Time Calculation:     Start Time: 1217  Stop Time: 1256  Time Calculation (min): 39 min  Total Timed Code Minutes- PT: 15 minute(s)  Timed Charges  02468 - PT Therapeutic Exercise Minutes: 15  Untimed Charges  PT Eval/Re-eval Minutes: 24  Total Minutes  Timed Charges Total Minutes: 15  Untimed Charges Total Minutes: 24   Total Minutes: 39     Therapy Charges for Today     Code Description Service Date Service Provider Modifiers Qty    58390534863 HC PT THER PROC EA 15 MIN 6/21/2021 Gwendolyn Onofre, PT GP 1    51395865979 HC PT EVAL LOW COMPLEXITY 2 6/21/2021 Gwendolyn Onofre, PT GP 1          PT G-Codes  Outcome Measure Options: Modifed Owestry  Modified Oswestry Score/Comments: 12% disability         Gwendolyn Onofre PT  6/21/2021

## 2021-07-01 ENCOUNTER — HOSPITAL ENCOUNTER (OUTPATIENT)
Dept: PHYSICAL THERAPY | Facility: HOSPITAL | Age: 71
Setting detail: THERAPIES SERIES
Discharge: HOME | End: 2021-07-01

## 2021-07-01 DIAGNOSIS — G89.29 CHRONIC RIGHT-SIDED LOW BACK PAIN WITH RIGHT-SIDED SCIATICA: Primary | ICD-10-CM

## 2021-07-01 DIAGNOSIS — M54.41 CHRONIC RIGHT-SIDED LOW BACK PAIN WITH RIGHT-SIDED SCIATICA: Primary | ICD-10-CM

## 2021-07-01 PROCEDURE — 97110 THERAPEUTIC EXERCISES: CPT

## 2021-07-01 NOTE — THERAPY TREATMENT NOTE
Outpatient Physical Therapy Ortho Treatment Note  [facility]     Patient Name: Jailene Molina  : 1950  MRN: [id number]  Today's Date: 2021      Visit Date: 2021    Visit Dx:    ICD-10-CM ICD-9-CM   1. Chronic right-sided low back pain with right-sided sciatica  M54.41 724.2    G89.29 724.3     338.29       Patient Active Problem List   Diagnosis   • Age-related osteoporosis without current pathological fracture   • Avitaminosis D   • Atrophy of vagina   • Osteoarthritis of both hands   • Onychomycosis of toenail   • Anxiety        Past Medical History:   Diagnosis Date   • Atrophy of vagina 2016    Description: ntoed at Gyn    • Avitaminosis D 2016   • Cataracts, bilateral     s/p removal   • Detached retina    • Grief reaction with prolonged bereavement 2018   • Osteoarthritis of both hands 2016    With AM stiffness < 30 minutes; noted DIP joint hypertrophy   • Osteopenia 2016    Description: mild at hip; artificially high FRAX in ; urine NTX borderline elevated. No meds started. Repeat DEXA in 2017        Past Surgical History:   Procedure Laterality Date   • BREAST BIOPSY      benign   • CATARACT EXTRACTION Bilateral                        PT Assessment/Plan     Row Name 21 0900          PT Assessment    Assessment Comments  Ms. Molina returns today with no pain and good HEP compliance. Cont to demo significant hip weakness R>L and poor core awareness. Pain has remained well managed, and anticipate pt will be able to d/c to self-management with cont strengthening program to cont to address issues of lateral hip weakness.  -CA        PT Plan    PT Plan Comments  Review added exercises, possible d/c to indep HEP or trial of indep HEP for 1-2 weeks if pt cont to have no pain. Make sure to spend time with education next session with chronic LBP managed, especially if pt returns to work discuss utilize periods of sitting and standing  throughout  shift or resting one LE on stool alternating LEs for positional changes. F/u if pt returned to [employer] fitness classes.  -CA       User Key  (r) = Recorded By, (t) = Taken By, (c) = Cosigned By    Initials Name Provider Type    Gwendolyn Anne, PT Physical Therapist            OP Exercises     Row Name 07/01/21 0900             Subjective Comments    Subjective Comments  Pt arrives at 9:06 for 9:00 appt, aplogizes for arriving late. She reports good success with initial HEP, and has had no issues with LBP.  -CA         Subjective Pain    Able to rate subjective pain?  yes  -CA      Pre-Treatment Pain Level  0  -CA         Total Minutes    07792 - PT Therapeutic Exercise Minutes  39  -CA         Exercise 1    Exercise Name 1  nu step  -CA      Cueing 1  Verbal  -CA      Time 1  5 min  -CA      Additional Comments  lvl 5  -CA         Exercise 2    Exercise Name 2  LTR  -CA      Cueing 2  Verbal  -CA      Reps 2  10 b  -CA      Time 2  3 sec  -CA         Exercise 3    Exercise Name 3  B piriformis str  -CA      Cueing 3  Verbal  -CA      Reps 3  3   -CA      Time 3  20 sec  -CA         Exercise 4    Exercise Name 4  bridge  -CA      Cueing 4  Verbal  -CA      Sets 4  2  -CA      Reps 4  10  -CA      Additional Comments  cues for TA  -CA         Exercise 5    Exercise Name 5  HL hip abd  -CA      Cueing 5  Verbal  -CA      Sets 5  2  -CA      Reps 5  10  -CA      Time 5  Rtb  -CA         Exercise 6    Exercise Name 6  B standing HS str  -CA      Cueing 6  Verbal  -CA      Reps 6  3  -CA      Time 6  20 sec  -CA      Additional Comments  likely d/c next visit as pt with only very mild HS limitations, want to focus on strengthening.  -CA         Exercise 7    Exercise Name 7  sciatic n glide  -CA      Cueing 7  Verbal  -CA      Reps 7  20 R  -CA         Exercise 8    Exercise Name 8  s/l clam  -CA      Cueing 8  Verbal;Tactile  -CA      Sets 8  2b  -CA      Reps 8  10  -CA      Time 8  RTB  -CA       Additional Comments  cues to avoid lumbar rotation; noted increased difficulty/weakness with R vs. L  -CA         Exercise 9    Exercise Name 9  TA + march  -CA      Cueing 9  Verbal  -CA      Sets 9  2  -CA      Reps 9  10  -CA         Exercise 10    Exercise Name 10  side steps  -CA      Cueing 10  Verbal;Demo  -CA      Reps 10  3 laps  -CA      Time 10  RTB at knees  -CA         Exercise 11    Exercise Name 11  AR Press  -CA      Cueing 11  Verbal  -CA      Sets 11  1  -CA      Reps 11  15 b  -CA      Time 11  RTB  -CA        User Key  (r) = Recorded By, (t) = Taken By, (c) = Cosigned By    Initials Name Provider Type    Gwendolyn Anne, PT Physical Therapist                       PT OP Goals     Row Name 07/01/21 0900          PT Short Term Goals    STG Date to Achieve  07/05/21  -CA     STG 1  Patient will be independent with education for symptom management and initial HEP to decrease LBP pain.  -CA     STG 1 Progress  Met  -CA     STG 2  Patient will verbalized good understanding of chronic LBP management and importance of switching positions at work/not standing for entire shift/allowing herself to take seated rest breaks in order to return to work without increase in pain.  -CA     STG 2 Progress  Ongoing  -CA        Long Term Goals    LTG Date to Achieve  07/19/21  -CA     LTG 1  Patient will be independent with education for symptom management and advanced HEP to decrease LBP pain.  -CA     LTG 1 Progress  Ongoing  -CA     LTG 2  Patient will report return to [employer] fitness classes with </=2/10 LBP  -CA     LTG 2 Progress  Ongoing  -CA     LTG 3  Pt will report return to work/work one full shift with </=2/10 LBP and able to manage pain by taking adequate seated rest breaks throughout shift.  -CA     LTG 3 Progress  Ongoing  -CA     LTG 4  Patient will improve B hip strength to at least 4-/5 to improve ability to stand for longer periods of time without pain.  -CA     LTG 4 Progress  Ongoing  -CA        User Key  (r) = Recorded By, (t) = Taken By, (c) = Cosigned By    Initials Name Provider Type    Gwendolyn Anne, PT Physical Therapist          Therapy Education  Education Details: Instructed pt to try fitness class at [employer] prior to next visit              Time Calculation:   Start Time: 0906  Stop Time: 0945  Time Calculation (min): 39 min  Total Timed Code Minutes- PT: 39 minute(s)  Timed Charges  31593 - PT Therapeutic Exercise Minutes: 39  Total Minutes  Timed Charges Total Minutes: 39   Total Minutes: 39  Therapy Charges for Today     Code Description Service Date Service Provider Modifiers Qty    91664956153  PT THER PROC EA 15 MIN 7/1/2021 Gwendolyn Onofre, PT GP 3                    Gwendolyn Onofre, PT  7/1/2021

## 2021-07-07 ENCOUNTER — HOSPITAL ENCOUNTER (OUTPATIENT)
Dept: PHYSICAL THERAPY | Facility: HOSPITAL | Age: 71
Setting detail: THERAPIES SERIES
Discharge: HOME | End: 2021-07-07

## 2021-07-07 DIAGNOSIS — G89.29 CHRONIC RIGHT-SIDED LOW BACK PAIN WITH RIGHT-SIDED SCIATICA: Primary | ICD-10-CM

## 2021-07-07 DIAGNOSIS — M54.41 CHRONIC RIGHT-SIDED LOW BACK PAIN WITH RIGHT-SIDED SCIATICA: Primary | ICD-10-CM

## 2021-07-07 PROCEDURE — 97110 THERAPEUTIC EXERCISES: CPT

## 2021-07-07 NOTE — THERAPY TREATMENT NOTE
Outpatient Physical Therapy Ortho Treatment Note  [facility]     Patient Name: Jailene Molina  : 1950  MRN: [id number]  Today's Date: 2021      Visit Date: 2021    Visit Dx:    ICD-10-CM ICD-9-CM   1. Chronic right-sided low back pain with right-sided sciatica  M54.41 724.2    G89.29 724.3     338.29       Patient Active Problem List   Diagnosis   • Age-related osteoporosis without current pathological fracture   • Avitaminosis D   • Atrophy of vagina   • Osteoarthritis of both hands   • Onychomycosis of toenail   • Anxiety        Past Medical History:   Diagnosis Date   • Atrophy of vagina 2016    Description: ntoed at Gyn    • Avitaminosis D 2016   • Cataracts, bilateral     s/p removal   • Detached retina    • Grief reaction with prolonged bereavement 2018   • Osteoarthritis of both hands 2016    With AM stiffness < 30 minutes; noted DIP joint hypertrophy   • Osteopenia 2016    Description: mild at hip; artificially high FRAX in ; urine NTX borderline elevated. No meds started. Repeat DEXA in 2017        Past Surgical History:   Procedure Laterality Date   • BREAST BIOPSY      benign   • CATARACT EXTRACTION Bilateral                        PT Assessment/Plan     Row Name 21 1400          PT Assessment    Assessment Comments  Linda reports continued improvement in symptoms, including pain and N/T. She attended stretch and abs class without pain or difficulty and she returns to work this coming weekend. Discussed pain management techniques and positions during shift (sitting, standing with LE on stool, walking) to use throughout shift, pt verbalizes understanding. Progressed resistance during HL clamshells with good tolerance and reviewed current HEP and therex progression. Overall, pt with excellent progress toward functional goals and remains approriate for PT.  -RS        PT Plan    PT Plan Comments  Decrease frequency, follow up  regarding tolerance for work and attempting another [employer] class  -RS       User Key  (r) = Recorded By, (t) = Taken By, (c) = Cosigned By    Initials Name Provider Type    RS Lucretia Carmona, PT Physical Therapist            OP Exercises     Row Name 07/07/21 1400             Subjective Comments    Subjective Comments  Pt continues to deny pain, goes back to work sunday  -RS         Subjective Pain    Able to rate subjective pain?  yes  -RS      Pre-Treatment Pain Level  0  -RS         Total Minutes    17403 - PT Therapeutic Exercise Minutes  40  -RS         Exercise 1    Exercise Name 1  nu step  -RS      Cueing 1  Verbal  -RS      Time 1  5 min  -RS      Additional Comments  L5  -RS         Exercise 2    Exercise Name 2  LTR  -RS      Cueing 2  Verbal  -RS      Reps 2  10 b  -RS      Time 2  3 sec  -RS         Exercise 3    Exercise Name 3  B piriformis str  -RS      Cueing 3  Verbal  -RS      Reps 3  3   -RS      Time 3  20 sec  -RS         Exercise 4    Exercise Name 4  bridge  -RS      Cueing 4  Verbal  -RS      Sets 4  2  -RS      Reps 4  10  -RS      Additional Comments  cues for TA  -RS         Exercise 5    Exercise Name 5  HL hip abd  -RS      Cueing 5  Verbal  -RS      Sets 5  2  -RS      Reps 5  10  -RS      Time 5  Rtb  -RS         Exercise 6    Exercise Name 6  education about body mechanicsc and positins for work  -RS      Cueing 6  --  -RS      Reps 6  --  -RS      Time 6  --  -RS         Exercise 7    Exercise Name 7  sciatic n glide  -RS      Cueing 7  --  -RS      Reps 7  --  -RS      Additional Comments  discussed  -RS         Exercise 8    Exercise Name 8  s/l clam  -RS      Cueing 8  Verbal;Tactile  -RS      Sets 8  2b  -RS      Reps 8  10  -RS      Time 8  RTB  -RS      Additional Comments  cues to avoid rocking thorugh upper body  -RS         Exercise 9    Exercise Name 9  TA + march  -RS      Cueing 9  Verbal  -RS      Sets 9  2  -RS      Reps 9  10  -RS         Exercise 10    Exercise  Name 10  side steps  -RS      Cueing 10  Verbal;Demo  -RS      Reps 10  3 laps  -RS      Time 10  RTB at knees  -RS         Exercise 11    Exercise Name 11  AR Press  -RS      Cueing 11  Verbal  -RS      Sets 11  1  -RS      Reps 11  15 b  -RS      Time 11  RTB  -RS        User Key  (r) = Recorded By, (t) = Taken By, (c) = Cosigned By    Initials Name Provider Type    RS Lucretia Carmona PT Physical Therapist                       PT OP Goals     Row Name 07/07/21 1400          PT Short Term Goals    STG Date to Achieve  07/05/21  -RS     STG 1  Patient will be independent with education for symptom management and initial HEP to decrease LBP pain.  -RS     STG 1 Progress  Met  -RS     STG 2  Patient will verbalized good understanding of chronic LBP management and importance of switching positions at work/not standing for entire shift/allowing herself to take seated rest breaks in order to return to work without increase in pain.  -RS     STG 2 Progress  Ongoing  -RS        Long Term Goals    LTG Date to Achieve  07/19/21  -RS     LTG 1  Patient will be independent with education for symptom management and advanced HEP to decrease LBP pain.  -RS     LTG 1 Progress  Ongoing  -RS     LTG 2  Patient will report return to [employer] fitness classes with </=2/10 LBP  -RS     LTG 2 Progress  Ongoing  -RS     LTG 3  Pt will report return to work/work one full shift with </=2/10 LBP and able to manage pain by taking adequate seated rest breaks throughout shift.  -RS     LTG 3 Progress  Ongoing  -RS     LTG 4  Patient will improve B hip strength to at least 4-/5 to improve ability to stand for longer periods of time without pain.  -RS     LTG 4 Progress  Ongoing  -RS       User Key  (r) = Recorded By, (t) = Taken By, (c) = Cosigned By    Initials Name Provider Type    RS Lucretia Carmona PT Physical Therapist          Therapy Education  Education Details: reviewed ergonomics for work (standing breaks with a stool, one foot  on stool/cabinat/ walking around store to avoid prolonged standing), spcaing shofts out to every other day  Given: HEP, Symptoms/condition management, Pain management, Posture/body mechanics  Program: Reinforced  How Provided: Verbal, Demonstration  Provided to: Patient  Level of Understanding: Verbalized, Demonstrated, Teach back education performed              Time Calculation:   Start Time: 1415  Stop Time: 1459  Time Calculation (min): 44 min  Timed Charges  87567 - PT Therapeutic Exercise Minutes: 40  Total Minutes  Timed Charges Total Minutes: 40   Total Minutes: 40  Therapy Charges for Today     Code Description Service Date Service Provider Modifiers Qty    11306295985 HC PT THER PROC EA 15 MIN 7/7/2021 Lucretia Carmona, PT GP 3                    Lucretia Carmona, PT  7/7/2021

## 2021-07-19 ENCOUNTER — HOSPITAL ENCOUNTER (OUTPATIENT)
Dept: PHYSICAL THERAPY | Facility: HOSPITAL | Age: 71
Setting detail: THERAPIES SERIES
Discharge: HOME | End: 2021-07-19

## 2021-07-19 DIAGNOSIS — G89.29 CHRONIC RIGHT-SIDED LOW BACK PAIN WITH RIGHT-SIDED SCIATICA: Primary | ICD-10-CM

## 2021-07-19 DIAGNOSIS — M54.41 CHRONIC RIGHT-SIDED LOW BACK PAIN WITH RIGHT-SIDED SCIATICA: Primary | ICD-10-CM

## 2021-07-19 PROCEDURE — 97110 THERAPEUTIC EXERCISES: CPT

## 2021-07-19 NOTE — THERAPY TREATMENT NOTE
Outpatient Physical Therapy Ortho Treatment Note  [facility]     Patient Name: Jailene Molina  : 1950  MRN: [id number]  Today's Date: 2021      Visit Date: 2021    Visit Dx:    ICD-10-CM ICD-9-CM   1. Chronic right-sided low back pain with right-sided sciatica  M54.41 724.2    G89.29 724.3     338.29       Patient Active Problem List   Diagnosis   • Age-related osteoporosis without current pathological fracture   • Avitaminosis D   • Atrophy of vagina   • Osteoarthritis of both hands   • Onychomycosis of toenail   • Anxiety        Past Medical History:   Diagnosis Date   • Atrophy of vagina 2016    Description: ntoed at Gyn    • Avitaminosis D 2016   • Cataracts, bilateral     s/p removal   • Detached retina    • Grief reaction with prolonged bereavement 2018   • Osteoarthritis of both hands 2016    With AM stiffness < 30 minutes; noted DIP joint hypertrophy   • Osteopenia 2016    Description: mild at hip; artificially high FRAX in ; urine NTX borderline elevated. No meds started. Repeat DEXA in 2017        Past Surgical History:   Procedure Laterality Date   • BREAST BIOPSY      benign   • CATARACT EXTRACTION Bilateral                        PT Assessment/Plan     Row Name 21 1200          PT Assessment    Assessment Comments  Linda continues to report well manged s/s even with return to work. Continued progression of hip and core strengthening today, although somewhat limited by late arrival to appt. Reminded pt of education re: posture, body mechanics, and back pain management at work.  -CA        PT Plan    PT Plan Comments  likely d/c to indep hep  -CA       User Key  (r) = Recorded By, (t) = Taken By, (c) = Cosigned By    Initials Name Provider Type    Gwendolyn Anne, PT Physical Therapist            OP Exercises     Row Name 21 1100             Subjective Comments    Subjective Comments  Arrives at 11:37 for 11:30 appt.  Reports has returned to work and tried to implement some of ideas for managing chronic back pain  -CA         Subjective Pain    Able to rate subjective pain?  yes  -CA      Pre-Treatment Pain Level  0  -CA         Total Minutes    61544 - PT Therapeutic Exercise Minutes  28  -CA         Exercise 1    Exercise Name 1  nu step  -CA      Cueing 1  Verbal  -CA      Time 1  3 min  -CA      Additional Comments  L5  -CA         Exercise 2    Exercise Name 2  LTR  -CA      Cueing 2  Verbal  -CA      Reps 2  10 b  -CA      Time 2  3 sec  -CA         Exercise 3    Exercise Name 3  B piriformis str  -CA      Cueing 3  Verbal  -CA      Reps 3  3   -CA      Time 3  20 sec  -CA         Exercise 4    Exercise Name 4  bridge  -CA      Cueing 4  Verbal  -CA      Sets 4  2  -CA      Reps 4  10  -CA         Exercise 5    Exercise Name 5  HL hip abd  -CA      Cueing 5  Verbal  -CA      Sets 5  2  -CA      Reps 5  10  -CA      Time 5  GTB  -CA         Exercise 6    Exercise Name 6  education about body mechanicsc and positins for work, and posture awareness  -CA         Exercise 8    Exercise Name 8  s/l clam  -CA      Cueing 8  Verbal;Tactile  -CA      Sets 8  2b  -CA      Reps 8  10  -CA      Time 8  RTB  -CA      Additional Comments  improved form today  -CA         Exercise 9    Exercise Name 9  TA + march  -CA      Cueing 9  Verbal  -CA      Sets 9  2  -CA      Reps 9  10  -CA         Exercise 10    Exercise Name 10  side steps  -CA      Cueing 10  Verbal;Demo  -CA      Reps 10  3 laps  -CA      Time 10  RTB at mid shin  -CA         Exercise 11    Exercise Name 11  AR Press  -CA      Cueing 11  Verbal  -CA      Sets 11  1  -CA      Reps 11  15 b  -CA      Time 11  RTB  -CA         Exercise 12    Exercise Name 12  B standing hip abd  -CA      Cueing 12  Verbal  -CA      Reps 12  15  -CA      Time 12  RTB at mid shin  -CA         Exercise 13    Exercise Name 13  mini squat  -CA      Cueing 13  Verbal  -CA      Sets 13  2  -CA       Reps 13  10  -CA      Time 13  at bar  -CA        User Key  (r) = Recorded By, (t) = Taken By, (c) = Cosigned By    Initials Name Provider Type    Gwendolyn Anne, PT Physical Therapist                                          Time Calculation:   Start Time: 1145  Stop Time: 1213  Time Calculation (min): 28 min  Total Timed Code Minutes- PT: 28 minute(s)  Timed Charges  15427 - PT Therapeutic Exercise Minutes: 28  Total Minutes  Timed Charges Total Minutes: 28   Total Minutes: 28  Therapy Charges for Today     Code Description Service Date Service Provider Modifiers Qty    14855363460  PT THER PROC EA 15 MIN 7/19/2021 Gwendolyn Onofre, PT GP 2                    Gwendolyn Onofre, PT  7/19/2021

## 2021-07-23 ENCOUNTER — TELEPHONE (OUTPATIENT)
Dept: INTERNAL MEDICINE | Facility: CLINIC | Age: 71
End: 2021-07-23

## 2021-07-23 NOTE — TELEPHONE ENCOUNTER
Caller: Raven Molina    Relationship: Self    Best call back number: [phone] (H)    Caller requesting test results: RAVEN OG    What test was performed: MRI    When was the test performed: 07/06/2021    Where was the test performed: [facility]    Additional notes: PATIENT WANTS TO KNOW IF RESULTS WERE RECEIVED BY DR HUERTAS AND ALSO WANTED TO LET DR HUERTAS KNOW SHE IS FEELING BETTER AND TO DISCUSS THE RESULTS, PLEASE CALL PATIENT BACK REGARDING THIS MRI

## 2021-07-23 NOTE — TELEPHONE ENCOUNTER
Called and spoke with patient.   I notified her that we can see Dr Catalan's office note and results.     She just wanted to make sure.

## 2021-08-25 DIAGNOSIS — E78.89 LIPIDS ABNORMAL: ICD-10-CM

## 2021-08-25 DIAGNOSIS — Z00.00 HEALTHCARE MAINTENANCE: Primary | ICD-10-CM

## 2021-08-25 DIAGNOSIS — E55.9 AVITAMINOSIS D: ICD-10-CM

## 2021-08-25 DIAGNOSIS — M81.0 AGE-RELATED OSTEOPOROSIS WITHOUT CURRENT PATHOLOGICAL FRACTURE: ICD-10-CM

## 2021-08-31 LAB
25(OH)D3+25(OH)D2 SERPL-MCNC: 37.9 NG/ML (ref 30–100)
ALBUMIN SERPL-MCNC: 4.4 G/DL (ref 3.5–5.2)
ALBUMIN/GLOB SERPL: 2.3 G/DL
ALP SERPL-CCNC: 77 U/L (ref 39–117)
ALT SERPL-CCNC: 29 U/L (ref 1–33)
AST SERPL-CCNC: 34 U/L (ref 1–32)
BASOPHILS # BLD AUTO: 0.04 10*3/MM3 (ref 0–0.2)
BASOPHILS NFR BLD AUTO: 0.9 % (ref 0–1.5)
BILIRUB SERPL-MCNC: 0.2 MG/DL (ref 0–1.2)
BUN SERPL-MCNC: 16 MG/DL (ref 8–23)
BUN/CREAT SERPL: 23.5 (ref 7–25)
CALCIUM SERPL-MCNC: 9.4 MG/DL (ref 8.6–10.5)
CHLORIDE SERPL-SCNC: 99 MMOL/L (ref 98–107)
CHOLEST SERPL-MCNC: 175 MG/DL (ref 0–200)
CO2 SERPL-SCNC: 29 MMOL/L (ref 22–29)
CREAT SERPL-MCNC: 0.68 MG/DL (ref 0.57–1)
EOSINOPHIL # BLD AUTO: 0.1 10*3/MM3 (ref 0–0.4)
EOSINOPHIL NFR BLD AUTO: 2.2 % (ref 0.3–6.2)
ERYTHROCYTE [DISTWIDTH] IN BLOOD BY AUTOMATED COUNT: 12.8 % (ref 12.3–15.4)
GLOBULIN SER CALC-MCNC: 1.9 GM/DL
GLUCOSE SERPL-MCNC: 87 MG/DL (ref 65–99)
HCT VFR BLD AUTO: 39.9 % (ref 34–46.6)
HDLC SERPL-MCNC: 56 MG/DL (ref 40–60)
HGB BLD-MCNC: 13.1 G/DL (ref 12–15.9)
IMM GRANULOCYTES # BLD AUTO: 0.02 10*3/MM3 (ref 0–0.05)
IMM GRANULOCYTES NFR BLD AUTO: 0.4 % (ref 0–0.5)
LDLC SERPL CALC-MCNC: 106 MG/DL (ref 0–100)
LYMPHOCYTES # BLD AUTO: 1.45 10*3/MM3 (ref 0.7–3.1)
LYMPHOCYTES NFR BLD AUTO: 31.7 % (ref 19.6–45.3)
MCH RBC QN AUTO: 30.6 PG (ref 26.6–33)
MCHC RBC AUTO-ENTMCNC: 32.8 G/DL (ref 31.5–35.7)
MCV RBC AUTO: 93.2 FL (ref 79–97)
MONOCYTES # BLD AUTO: 0.56 10*3/MM3 (ref 0.1–0.9)
MONOCYTES NFR BLD AUTO: 12.3 % (ref 5–12)
NEUTROPHILS # BLD AUTO: 2.4 10*3/MM3 (ref 1.7–7)
NEUTROPHILS NFR BLD AUTO: 52.5 % (ref 42.7–76)
NRBC BLD AUTO-RTO: 0 /100 WBC (ref 0–0.2)
PLATELET # BLD AUTO: 226 10*3/MM3 (ref 140–450)
POTASSIUM SERPL-SCNC: 4.4 MMOL/L (ref 3.5–5.2)
PROT SERPL-MCNC: 6.3 G/DL (ref 6–8.5)
RBC # BLD AUTO: 4.28 10*6/MM3 (ref 3.77–5.28)
SODIUM SERPL-SCNC: 137 MMOL/L (ref 136–145)
TRIGL SERPL-MCNC: 68 MG/DL (ref 0–150)
UNABLE TO VOID: NORMAL
VLDLC SERPL CALC-MCNC: 13 MG/DL (ref 5–40)
WBC # BLD AUTO: 4.57 10*3/MM3 (ref 3.4–10.8)

## 2021-09-03 ENCOUNTER — OFFICE VISIT (OUTPATIENT)
Dept: INTERNAL MEDICINE | Facility: CLINIC | Age: 71
End: 2021-09-03

## 2021-09-03 VITALS
OXYGEN SATURATION: 97 % | DIASTOLIC BLOOD PRESSURE: 86 MMHG | WEIGHT: 116 LBS | TEMPERATURE: 97.1 F | RESPIRATION RATE: 12 BRPM | BODY MASS INDEX: 18.64 KG/M2 | HEIGHT: 66 IN | SYSTOLIC BLOOD PRESSURE: 120 MMHG | HEART RATE: 57 BPM

## 2021-09-03 DIAGNOSIS — E55.9 AVITAMINOSIS D: ICD-10-CM

## 2021-09-03 DIAGNOSIS — M19.041 PRIMARY OSTEOARTHRITIS OF BOTH HANDS: ICD-10-CM

## 2021-09-03 DIAGNOSIS — M81.0 AGE-RELATED OSTEOPOROSIS WITHOUT CURRENT PATHOLOGICAL FRACTURE: ICD-10-CM

## 2021-09-03 DIAGNOSIS — Z00.00 HEALTHCARE MAINTENANCE: Primary | ICD-10-CM

## 2021-09-03 DIAGNOSIS — B35.1 ONYCHOMYCOSIS OF TOENAIL: ICD-10-CM

## 2021-09-03 DIAGNOSIS — N95.2 ATROPHY OF VAGINA: ICD-10-CM

## 2021-09-03 DIAGNOSIS — F41.9 ANXIETY: ICD-10-CM

## 2021-09-03 DIAGNOSIS — M19.042 PRIMARY OSTEOARTHRITIS OF BOTH HANDS: ICD-10-CM

## 2021-09-03 PROCEDURE — 99214 OFFICE O/P EST MOD 30 MIN: CPT | Performed by: INTERNAL MEDICINE

## 2021-09-03 PROCEDURE — G0439 PPPS, SUBSEQ VISIT: HCPCS | Performed by: INTERNAL MEDICINE

## 2021-09-03 NOTE — PATIENT INSTRUCTIONS
Tdap (tetanus with whooping cough booster) along with Flu vaccine at pharmacy.   No shot within 2 weeks of COVID shot (booster).       Medicare Wellness  Personal Prevention Plan of Service     Date of Office Visit:  2021  Encounter Provider:  Allan Daugherty MD  Place of Service:  [facility]  Patient Name: Jailene Molina  :  1950    As part of the Medicare Wellness portion of your visit today, we are providing you with this personalized preventive plan of services (PPPS). This plan is based upon recommendations of the [place] Preventive Services Task Force (USPSTF) and the Advisory Committee on Immunization Practices (ACIP).    This lists the preventive care services that should be considered, and provides dates of when you are due. Items listed as completed are up-to-date and do not require any further intervention.    Health Maintenance   Topic Date Due   • TDAP/TD VACCINES (2 - Td or Tdap) 2020   • INFLUENZA VACCINE  10/01/2021   • ANNUAL WELLNESS VISIT  2022   • COLORECTAL CANCER SCREENING  2023   • MAMMOGRAM  2023   • DXA SCAN  2023   • PAP SMEAR  2024   • HEPATITIS C SCREENING  Completed   • COVID-19 Vaccine  Completed   • Pneumococcal Vaccine 65+  Completed   • ZOSTER VACCINE  Completed       Orders Placed This Encounter   Procedures   • UA / M With / Rflx Culture([facility] ONLY) - Urine, Clean Catch     Order Specific Question:   Release to patient     Answer:   Immediate       Return in about 1 year (around 9/3/2022) for Annual physical.

## 2021-09-03 NOTE — PROGRESS NOTES
"Subjective      Chief Complaint   Patient presents with   • Annual Exam     review of medical issues    • Arthritis       HPI:  Jailene Molina is a 70 y.o. female RTC in yearly AWV, review of medical issues:   Has been doing \"alright\". Has restarted a new parttime job.   1. Dx of DDD (severe at L4-L5) and mod- severe spinal stenosis at L4-L5, less severe at adjacent levels - saw Dr. Damico at [facility].  Pain was better and additional tx deferred. Rec'd for epidurals if pain recurs.   Pt is now back at gym and active after saw PT and finished PT here.  Pain really is resolved at this time.  \"There is no pain\".  No meds at this time for back.  2. Osteopenia - persisted on DEXA 8/2017. C/W exercise at [employer] 3x/ week.  Recheck DEXA completed in 2021 and 'she was surprised' as numbers were mildly down.    3. Vitamin D deficiency - off Vitamin D3 2000 I.U., taking Fosteum only.  4. Osteoarthritis, in B hands -  Exam remains c/w OA.  Saw rheum with Dr. Ambriz.  Considering other options, but 'I dont know what'. Feels like Celebrex is not as effective as once was. L hand seems to have flared and is rather painful.  Could tolerate off Tylenol.  \"I have a call in to her\".  Using Tylenol in AM along with Celebrex.   5. HM -  Pap - 2015 with Gyn, D/C due to pain and atrophy vaginally, had pelvic in 6/2021;  Mammo (-) 6/2021 with Gyn    The following portions of the patient's history were reviewed and updated as appropriate: allergies, current medications, past family history, past medical history, past social history, past surgical history and problem list.    Review of Systems   Constitutional: Negative for chills, fever, malaise/fatigue, weight gain and weight loss.   HENT: Negative for congestion, hearing loss, odynophagia and sore throat.    Eyes: Negative for discharge, double vision, pain and redness.        Last eye exam ~1/2021; wears glasses   Cardiovascular: Negative for chest pain, dyspnea on exertion, irregular " heartbeat, leg swelling, near-syncope, palpitations and syncope.   Respiratory: Negative for cough and shortness of breath.    Endocrine: Negative for polydipsia, polyphagia and polyuria.   Hematologic/Lymphatic: Negative for bleeding problem. Does not bruise/bleed easily.   Skin: Negative for suspicious lesions.   Musculoskeletal: Positive for arthritis and joint pain. Negative for back pain, joint swelling, muscle cramps, muscle weakness and myalgias.   Gastrointestinal: Negative for constipation, diarrhea, dysphagia, heartburn, nausea and vomiting.   Genitourinary: Negative for dysuria, frequency, hematuria, hesitancy and incomplete emptying.   Neurological: Negative for dizziness, headaches and light-headedness.   Psychiatric/Behavioral: Negative for depression and substance abuse. The patient does not have insomnia and is not nervous/anxious.    Allergic/Immunologic: Negative for environmental allergies and persistent infections.       Patient Care Team:  Allan Daugherty MD as PCP - General (Internal Medicine)    Recent Hospitalizations: No recent hospitalization(s).    Depression Screen:   PHQ-2/PHQ-9 Depression Screening 9/3/2021   Little interest or pleasure in doing things 0   Feeling down, depressed, or hopeless 0   Trouble falling or staying asleep, or sleeping too much -   Feeling tired or having little energy -   Poor appetite or overeating -   Feeling bad about yourself - or that you are a failure or have let yourself or your family down -   Trouble concentrating on things, such as reading the newspaper or watching television -   Moving or speaking so slowly that other people could have noticed. Or the opposite - being so fidgety or restless that you have been moving around a lot more than usual -   Thoughts that you would be better off dead, or of hurting yourself in some way -   Total Score 0   If you checked off any problems, how difficult have these problems made it for you to do your work, take  "care of things at home, or get along with other people? -       Functional and Cognitive Screening:  Functional & Cognitive Status 9/3/2021   Do you have difficulty preparing food and eating? No   Do you have difficulty bathing yourself, getting dressed or grooming yourself? No   Do you have difficulty using the toilet? No   Do you have difficulty moving around from place to place? No   Do you have trouble with steps or getting out of a bed or a chair? No   Current Diet Well Balanced Diet   Dental Exam Up to date   Eye Exam Up to date   Exercise (times per week) 3 times per week   Current Exercises Include Walking   Current Exercise Activities Include -   Do you need help using the phone?  No   Are you deaf or do you have serious difficulty hearing?  No   Do you need help with transportation? No   Do you need help shopping? No   Do you need help preparing meals?  No   Do you need help with housework?  No   Do you need help with laundry? No   Do you need help taking your medications? No   Do you need help managing money? No   Do you ever drive or ride in a car without wearing a seat belt? No   Have you felt unusual stress, anger or loneliness in the last month? No   Who do you live with? -   If you need help, do you have trouble finding someone available to you? No   Have you been bothered in the last four weeks by sexual problems? No   Do you have difficulty concentrating, remembering or making decisions? -       Does the patient have evidence of cognitive impairment? no    Does not need ASA (and currently is not on it)    Vitals:    09/03/21 1256   BP: 120/86   Pulse: 57   Resp: 12   Temp: 97.1 °F (36.2 °C)   SpO2: 97%   Weight: 52.6 kg (116 lb)   Height: 167.6 cm (66\")   PainSc: 0-No pain       Body mass index is 18.72 kg/m².    Visual Acuity:  No exam data present    Advanced Care Planning:  ACP discussion was held with the patient during this visit. Patient has an advance directive (not in EMR), copy " requested.    Objective   Physical Exam  Vitals reviewed.   Constitutional:       General: She is not in acute distress.     Appearance: Normal appearance. She is well-developed. She is not ill-appearing or toxic-appearing.   HENT:      Head: Normocephalic and atraumatic.      Right Ear: Hearing, ear canal and external ear normal. There is no impacted cerumen.      Left Ear: Hearing, tympanic membrane, ear canal and external ear normal. There is no impacted cerumen.      Ears:      Comments: Cerumen obscures TM on R     Nose: Nose normal.      Mouth/Throat:      Mouth: Mucous membranes are moist. No injury or oral lesions.      Dentition: Does not have dentures.      Tongue: No lesions.      Pharynx: Oropharynx is clear. Uvula midline. No pharyngeal swelling, oropharyngeal exudate, posterior oropharyngeal erythema or uvula swelling.   Eyes:      General: Lids are normal. No scleral icterus.        Right eye: No discharge.         Left eye: No discharge.      Extraocular Movements: Extraocular movements intact.      Conjunctiva/sclera: Conjunctivae normal.      Pupils: Pupils are equal, round, and reactive to light.   Neck:      Thyroid: No thyroid mass or thyromegaly.      Vascular: No carotid bruit.      Trachea: Trachea normal.   Cardiovascular:      Rate and Rhythm: Normal rate and regular rhythm.      Pulses:           Radial pulses are 2+ on the right side and 2+ on the left side.        Dorsalis pedis pulses are 2+ on the right side and 2+ on the left side.        Posterior tibial pulses are 2+ on the right side and 2+ on the left side.      Heart sounds: Normal heart sounds, S1 normal and S2 normal. No murmur heard.   No friction rub. No gallop.    Pulmonary:      Effort: Pulmonary effort is normal. No respiratory distress.      Breath sounds: Normal breath sounds. No wheezing, rhonchi or rales.   Abdominal:      General: Bowel sounds are normal. There is no distension.      Palpations: Abdomen is soft. There  is no mass.      Tenderness: There is no abdominal tenderness. There is no guarding or rebound.   Musculoskeletal:         General: Normal range of motion.      Right hand: Deformity (diffuse DIP joint hypertrophy) present. Normal strength.      Left hand: Deformity (diffuse DIP joint hypertrophy) present. Normal strength.      Cervical back: Full passive range of motion without pain. No rigidity. No muscular tenderness. Normal range of motion.      Right lower leg: No edema.      Left lower leg: No edema.      Right foot: Normal range of motion. No deformity or bunion.      Left foot: Normal range of motion. No deformity or bunion.   Feet:      Right foot:      Skin integrity: No ulcer, blister or skin breakdown.      Left foot:      Skin integrity: No ulcer, blister or skin breakdown.   Lymphadenopathy:      Cervical: No cervical adenopathy.      Upper Body:      Right upper body: No supraclavicular, axillary or pectoral adenopathy.      Left upper body: No supraclavicular, axillary or pectoral adenopathy.      Lower Body: No right inguinal adenopathy. No left inguinal adenopathy.   Skin:     General: Skin is warm and dry.      Findings: No rash.   Neurological:      Mental Status: She is alert and oriented to person, place, and time.      Cranial Nerves: No cranial nerve deficit.      Sensory: No sensory deficit.      Motor: No weakness, tremor, atrophy or abnormal muscle tone.      Gait: Gait normal.      Deep Tendon Reflexes: Reflexes are normal and symmetric.      Reflex Scores:       Patellar reflexes are 2+ on the right side and 2+ on the left side.       Achilles reflexes are 2+ on the right side and 2+ on the left side.  Psychiatric:         Mood and Affect: Mood normal.         Behavior: Behavior normal.         Thought Content: Thought content normal.         Compared to one year ago, the patient feels physical health is the same.  Compared to one year ago, the patient feels mental health is the  same.    Reviewed chart for potential of high risk medication in the elderly: yes  Reviewed chart for potential of harmful drug interactions in the elderly:yes    Identification of Risk Factors:  Risk factors include: Advance Directive Discussion  Immunizations Discussed/Encouraged (specific immunizations; Tdap, Influenza and COVID19 )  Osteoporosis Risk.    Patient Self-Management and Personalized Health Advice  The patient has been provided with information about: exercise and preventive services including:   · Annual Wellness Visit (AWV)  · Bone Density Measurements  · Colorectal Cancer Screening, Colonoscopy.    Discussed the patient's BMI with her. The BMI is in the acceptable range.    Assessment/Plan   Diagnoses and all orders for this visit:    1. Healthcare maintenance (Primary)  -     UA / M With / Rflx Culture([facility] ONLY) - Urine, Clean Catch    2. Age-related osteoporosis without current pathological fracture    3. Avitaminosis D    4. Atrophy of vagina    5. Primary osteoarthritis of both hands    6. Onychomycosis of toenail    7. Anxiety            Diagnoses and all orders for this visit:    Healthcare maintenance  -     UA / M With / Rflx Culture([facility] ONLY) - Urine, Clean Catch    Age-related osteoporosis without current pathological fracture    Avitaminosis D    Atrophy of vagina    Primary osteoarthritis of both hands    Onychomycosis of toenail    Anxiety        Jailene Molina is a 70 y.o. female RTC in yearly AWV, review of medical issues:    1. Dx of DDD (severe at L4-L5) and mod- severe spinal stenosis at L4-L5, less severe at adjacent levels - s/p eval with Dr. Damico at [facility] and not intervention advised with resolved pain with PT. C/W exercise as she is.  Epidurals advised for recurrent pain issues.   2. Osteopenia - persisted on DEXA 8/2017. S/P DEXA 2021 with Gyn, C/W exercise at [facility] 3x/ week and Fosteum supplement advised. Pt to forward DEXA for my review.   3. Vitamin D  deficiency - replete on Fosteum daily  4. Osteoarthritis, B hands -  Exam remains c/w OA.  S/P eval with rheum, Dr. Ambriz.  Considering other tx options, but pt to f/u to discuss.  For now is on Celebrex and PRN Tylenol.   5. Onychomycosis - B great toes.  Deferred today.   6. HM - labs d/w pt; Flu/ Tdap - at pharmacy; Pvax/ Prevnar/ Hep A/ Shingrix/ COVID - UTD; C-scope UTD 2013 (-) --> 10 years; Pap - 2015 with Gyn --> 6/2021;  Mammo (-) 6/2021 with Gyn;  DEXA 6/2021 --> 2 years; Hep C Ab (-) 2014 labs; C/W exercise; Preventative care plan provided to pt at end of visit      Return in about 1 year (around 9/3/2022) for Annual physical.          Current Outpatient Medications:   •  acetaminophen (TYLENOL) 500 MG tablet, 2 tabs PO Q 8 hours PRN, Disp: 30 tablet, Rfl: 0  •  celecoxib (CeleBREX) 200 MG capsule, TAKE 1 CAPSULE BY MOUTH DAILY WITH FOOD, Disp: , Rfl:   •  Dietary Management Product (FOSTEUM PLUS PO), Take  by mouth. Take 1 twice daily, Disp: , Rfl:   •  methylcellulose, Laxative, (CITRUCEL) 500 MG tablet tablet, Take  by mouth., Disp: , Rfl:

## 2021-09-08 LAB
APPEARANCE UR: ABNORMAL
BACTERIA #/AREA URNS HPF: ABNORMAL /[HPF]
BACTERIA UR CULT: ABNORMAL
BILIRUB UR QL STRIP: NEGATIVE
CASTS URNS QL MICRO: ABNORMAL /LPF
COLOR UR: YELLOW
EPI CELLS #/AREA URNS HPF: ABNORMAL /HPF (ref 0–10)
GLUCOSE UR QL: NEGATIVE
HGB UR QL STRIP: ABNORMAL
KETONES UR QL STRIP: ABNORMAL
LEUKOCYTE ESTERASE UR QL STRIP: ABNORMAL
MICRO URNS: ABNORMAL
NITRITE UR QL STRIP: POSITIVE
OTHER ANTIBIOTIC SUSC ISLT: ABNORMAL
PH UR STRIP: 6.5 [PH] (ref 5–7.5)
PROT UR QL STRIP: NEGATIVE
RBC #/AREA URNS HPF: ABNORMAL /HPF (ref 0–2)
SP GR UR: 1.02 (ref 1–1.03)
URINALYSIS REFLEX: ABNORMAL
UROBILINOGEN UR STRIP-MCNC: 0.2 MG/DL (ref 0.2–1)
WBC #/AREA URNS HPF: >30 /HPF (ref 0–5)

## 2021-09-09 DIAGNOSIS — N30.01 ACUTE CYSTITIS WITH HEMATURIA: Primary | ICD-10-CM

## 2021-09-09 RX ORDER — NITROFURANTOIN 25; 75 MG/1; MG/1
100 CAPSULE ORAL 2 TIMES DAILY
Qty: 14 CAPSULE | Refills: 0 | Status: SHIPPED | OUTPATIENT
Start: 2021-09-09 | End: 2021-09-16

## 2021-09-22 DIAGNOSIS — N30.01 ACUTE CYSTITIS WITH HEMATURIA: Primary | ICD-10-CM

## 2021-09-29 LAB
APPEARANCE UR: CLEAR
BACTERIA #/AREA URNS HPF: NORMAL /HPF
BILIRUB UR QL STRIP: NEGATIVE
CASTS URNS QL MICRO: NORMAL /LPF
COLOR UR: YELLOW
EPI CELLS #/AREA URNS HPF: NORMAL /HPF (ref 0–10)
GLUCOSE UR QL: NEGATIVE
HGB UR QL STRIP: NEGATIVE
KETONES UR QL STRIP: NEGATIVE
LEUKOCYTE ESTERASE UR QL STRIP: NEGATIVE
MICRO URNS: NORMAL
MICRO URNS: NORMAL
NITRITE UR QL STRIP: NEGATIVE
PH UR STRIP: 7 [PH] (ref 5–7.5)
PROT UR QL STRIP: NEGATIVE
RBC #/AREA URNS HPF: NORMAL /HPF (ref 0–2)
SP GR UR: 1.01 (ref 1–1.03)
URINALYSIS REFLEX: NORMAL
UROBILINOGEN UR STRIP-MCNC: 0.2 MG/DL (ref 0.2–1)
WBC #/AREA URNS HPF: NORMAL /HPF (ref 0–5)

## 2021-10-05 ENCOUNTER — TELEPHONE (OUTPATIENT)
Dept: INTERNAL MEDICINE | Facility: CLINIC | Age: 71
End: 2021-10-05

## 2021-10-05 NOTE — TELEPHONE ENCOUNTER
Caller: Jailene Molina    Relationship: Self    Best call back number: [phone]    Caller requesting test results: PATIENT     What test was performed: URINE ANALYSIS    When was the test performed: 09/28/21    Where was the test performed: OFFICE    Additional notes: PATIENT SAW TEST RESULTS ON MYCHART, BUT SAID THAT SHE WANTED TO MAKE SURE SHE UNDERSTOOD THEM CORRECTLY AND WANTED TO SEE IF SHE NEEDS FURTHER FOLLOW UP

## 2021-10-18 ENCOUNTER — FLU SHOT (OUTPATIENT)
Dept: INTERNAL MEDICINE | Facility: CLINIC | Age: 71
End: 2021-10-18

## 2021-10-18 PROCEDURE — G0008 ADMIN INFLUENZA VIRUS VAC: HCPCS | Performed by: INTERNAL MEDICINE

## 2021-10-18 PROCEDURE — 90715 TDAP VACCINE 7 YRS/> IM: CPT | Performed by: INTERNAL MEDICINE

## 2021-10-18 PROCEDURE — 90472 IMMUNIZATION ADMIN EACH ADD: CPT | Performed by: INTERNAL MEDICINE

## 2021-10-18 PROCEDURE — 90662 IIV NO PRSV INCREASED AG IM: CPT | Performed by: INTERNAL MEDICINE

## 2021-11-10 RX ORDER — CELECOXIB 200 MG/1
200 CAPSULE ORAL DAILY
Qty: 90 CAPSULE | Refills: 3 | Status: SHIPPED | OUTPATIENT
Start: 2021-11-10 | End: 2022-12-15

## 2021-11-10 NOTE — TELEPHONE ENCOUNTER
Caller: Jailene Molina    Relationship: Self    Best call back number: [phone]    Requested Prescriptions:   Requested Prescriptions     Pending Prescriptions Disp Refills   • celecoxib (CeleBREX) 200 MG capsule          Pharmacy where request should be sent: [id number] [address] [phone] [facility] [phone]      Additional details provided by patient: PATIENT WANTING TO KNOW IF DR. HUERTAS WOULD BE WILLING TO TAKE OVER PRESCRIPTION   PLEASE CALL AND ADVISE   Does the patient have less than a 3 day supply:  [] Yes  [] No    Shani Rivera Rep   11/10/21 09:01 EST

## 2022-04-19 ENCOUNTER — TELEPHONE (OUTPATIENT)
Dept: NEUROSURGERY | Facility: CLINIC | Age: 72
End: 2022-04-19
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
## 2022-04-20 ENCOUNTER — TELEPHONE (OUTPATIENT)
Dept: ORTHOPEDIC SURGERY | Facility: CLINIC | Age: 72
End: 2022-04-20
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
## 2022-04-21 ENCOUNTER — TELEPHONE (OUTPATIENT)
Dept: INTERNAL MEDICINE | Facility: CLINIC | Age: 72
End: 2022-04-21

## 2022-04-21 DIAGNOSIS — S12.401D CLOSED NONDISPLACED FRACTURE OF FIFTH CERVICAL VERTEBRA WITH ROUTINE HEALING, UNSPECIFIED FRACTURE MORPHOLOGY, SUBSEQUENT ENCOUNTER: Primary | ICD-10-CM
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
## 2022-04-27 ENCOUNTER — TELEPHONE (OUTPATIENT)
Dept: INTERNAL MEDICINE | Facility: CLINIC | Age: 72
End: 2022-04-27

## 2022-04-27 NOTE — TELEPHONE ENCOUNTER
Caller: CAT HOLLAND     Relationship: Other    Best call back number: [phone]    What was the call regarding: PATIENT'S  IS CALLING IN REGARDS TO A REFERRAL TO NEUROSURGERY. PATIENT'S  STATED THAT THE REFERRAL IS NO LONGER NEEDED. PATIENT WILL BE GOING TO [facility], DR LIAM RAMIREZ.      Do you require a callback: IF ANY QUESTIONS

## 2022-05-23 ENCOUNTER — OFFICE VISIT (OUTPATIENT)
Dept: INTERNAL MEDICINE | Facility: CLINIC | Age: 72
End: 2022-05-23

## 2022-05-23 VITALS
SYSTOLIC BLOOD PRESSURE: 132 MMHG | TEMPERATURE: 96.9 F | BODY MASS INDEX: 18.48 KG/M2 | WEIGHT: 115 LBS | HEIGHT: 66 IN | DIASTOLIC BLOOD PRESSURE: 80 MMHG | HEART RATE: 67 BPM | OXYGEN SATURATION: 98 %
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
## 2022-05-23 DIAGNOSIS — M62.838 MUSCLE SPASMS OF BOTH LOWER EXTREMITIES: ICD-10-CM

## 2022-05-23 DIAGNOSIS — S32.599A CLOSED FRACTURE OF PUBIC RAMUS, UNSPECIFIED LATERALITY, INITIAL ENCOUNTER: Primary | ICD-10-CM

## 2022-05-23 DIAGNOSIS — S32.10XD CLOSED FRACTURE OF SACRUM WITH ROUTINE HEALING, UNSPECIFIED PORTION OF SACRUM, SUBSEQUENT ENCOUNTER: ICD-10-CM

## 2022-05-23 DIAGNOSIS — S72.141A CLOSED DISPLACED INTERTROCHANTERIC FRACTURE OF RIGHT FEMUR, INITIAL ENCOUNTER: ICD-10-CM

## 2022-05-23 DIAGNOSIS — H81.12 BPPV (BENIGN PAROXYSMAL POSITIONAL VERTIGO), LEFT: ICD-10-CM

## 2022-05-23 PROBLEM — S72.143A: Status: ACTIVE | Noted: 2022-04-25

## 2022-05-23 PROBLEM — S32.10XA FRACTURE OF SACRUM: Status: ACTIVE | Noted: 2022-04-25

## 2022-05-23 PROCEDURE — 99214 OFFICE O/P EST MOD 30 MIN: CPT | Performed by: INTERNAL MEDICINE

## 2022-05-23 RX ORDER — SENNA PLUS 8.6 MG/1
1 TABLET ORAL DAILY
COMMUNITY
End: 2022-09-13

## 2022-05-23 RX ORDER — METHOCARBAMOL 500 MG/1
500 TABLET, FILM COATED ORAL 4 TIMES DAILY
COMMUNITY
Start: 2022-05-19 | End: 2022-05-23 | Stop reason: SDUPTHER

## 2022-05-23 RX ORDER — METHOCARBAMOL 500 MG/1
500 TABLET, FILM COATED ORAL 2 TIMES DAILY PRN
Qty: 60 TABLET | Refills: 0 | Status: SHIPPED | OUTPATIENT
Start: 2022-05-23 | End: 2022-06-22
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
## 2022-06-24 ENCOUNTER — TREATMENT (OUTPATIENT)
Dept: PHYSICAL THERAPY | Facility: CLINIC | Age: 72
End: 2022-06-24

## 2022-06-24 DIAGNOSIS — H81.11 BPPV (BENIGN PAROXYSMAL POSITIONAL VERTIGO), RIGHT: Primary | ICD-10-CM

## 2022-06-24 PROCEDURE — 97161 PT EVAL LOW COMPLEX 20 MIN: CPT | Performed by: PHYSICAL THERAPIST

## 2022-06-24 PROCEDURE — 95992 CANALITH REPOSITIONING PROC: CPT | Performed by: PHYSICAL THERAPIST
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
## 2022-06-30 ENCOUNTER — TREATMENT (OUTPATIENT)
Dept: PHYSICAL THERAPY | Facility: CLINIC | Age: 72
End: 2022-06-30

## 2022-06-30 DIAGNOSIS — H81.11 BPPV (BENIGN PAROXYSMAL POSITIONAL VERTIGO), RIGHT: Primary | ICD-10-CM

## 2022-06-30 PROCEDURE — 95992 CANALITH REPOSITIONING PROC: CPT | Performed by: PHYSICAL THERAPIST

## 2022-06-30 NOTE — PROGRESS NOTES
Vestibular Daily Progress Note    Visit#:2  Subjective   I have not had any more dizziness with the exercise you gave me. I am driving again.   Objective            Positional Testing  Positional Testing: With infrared goggles  Shawn-Hallpike Right: No nystagmus  [place]-Hallpike Left: Upbeat, left rotatory nystagmus  Shawn-Hallpike Left Onset Time : 4 sec  [place]-Hallpike Left Duration Time : 15 sec  Horizontal Roll Test Right:  (NA)  Horizontal Roll Test Left:  (NA)  Treatment: L Epley             PROCEDURES AND MODALITIES:  Paraffin:    Moist Heat:    Ice:    E-Stim:    Ultrasound:    Ionto:   Traction:    See Exercise, Manual, and Modality Logs for complete treatment.   Other Procedure CPT 21444 minutes 0       Timed Code Treatment: 0 Minutes  Total Treatment Time: 30 Minutes    Assessment & Plan   Patient tested negative for BPPV on the R today. Left side was tested and was positive for R PC canalithiasis BPPV. She responded excellent Epley CRP for the R, thus it was given as treatment for the L. Making good progress thus far.  Progress per Plan of Care    Chelita Angel, PT, DPT, CHT, LOVEN  Physical Therapist  [place] license # [id number]

## 2022-07-07 ENCOUNTER — TREATMENT (OUTPATIENT)
Dept: PHYSICAL THERAPY | Facility: CLINIC | Age: 72
End: 2022-07-07

## 2022-07-07 DIAGNOSIS — H81.11 BPPV (BENIGN PAROXYSMAL POSITIONAL VERTIGO), RIGHT: Primary | ICD-10-CM

## 2022-07-07 PROCEDURE — 95992 CANALITH REPOSITIONING PROC: CPT | Performed by: PHYSICAL THERAPIST

## 2022-07-07 NOTE — PROGRESS NOTES
Vestibular Daily Progress Note    Visit#:3  Subjective   I have not had any more spinning dizziness but I still feel a little unsteady.   Objective            Positional Testing  Positional Testing: With infrared goggles  [place]-Hallpike Right: No nystagmus  Shawn-Hallpike Left: No nystagmus  Horizontal Roll Test Right: Left-beating  Horizontal Roll Test Right Onset Time : immediate  Horizontal Roll Test Right Duration Time : > 1 min  Horizontal Roll Test Left: Right-beating  Horizontal Roll Test Left Onset Time : immediate  Horizontal Roll Test Left Duration Time : > 1 min  Treatment: R Casani             PROCEDURES AND MODALITIES:  Paraffin:    Moist Heat:    Ice:    E-Stim:    Ultrasound:    Ionto:   Traction:    See Exercise, Manual, and Modality Logs for complete treatment.   Other Procedure CPT 94678 minutes 0       Timed Code Treatment: 0 Minutes  Total Treatment Time: 30 Minutes    Assessment & Plan   B PC canalithiasis BPPV is resolved. Today she tested positive for mild R LC cupulolithiasis BPPV. This will not likely cause dizziness but can cause imbalance. Treated with CRP today and tolerated well. She is doing significantly better since first VRT treatment.    Progress per Plan of Care    Chelita Angel, PT, DPT, CHT, LOVEN  Physical Therapist  [place] license # [id number]

## 2022-07-19 ENCOUNTER — TREATMENT (OUTPATIENT)
Dept: PHYSICAL THERAPY | Facility: CLINIC | Age: 72
End: 2022-07-19

## 2022-07-19 DIAGNOSIS — H81.11 BPPV (BENIGN PAROXYSMAL POSITIONAL VERTIGO), RIGHT: Primary | ICD-10-CM

## 2022-07-19 PROCEDURE — 95992 CANALITH REPOSITIONING PROC: CPT | Performed by: PHYSICAL THERAPIST

## 2022-07-19 NOTE — PROGRESS NOTES
Vestibular Daily Progress Note    Visit#:4  Subjective   I had re-occurrance of dizziness this week. I was able to get better and return to work but I still feel off.   Objective            Positional Testing  Positional Testing: With infrared goggles  Shawn-Hallpike Right: No nystagmus  Shawn-Hallpike Left: No nystagmus  Horizontal Roll Test Right: No nystagmus  Horizontal Roll Test Left: Left-beating  Treatment: L BBQ Roll             PROCEDURES AND MODALITIES:  Paraffin:    Moist Heat:    Ice:    E-Stim:    Ultrasound:    Ionto:   Traction:    See Exercise, Manual, and Modality Logs for complete treatment.   Other Procedure CPT 76435 minutes 0       Timed Code Treatment: 0 Minutes  Total Treatment Time: 30 Minutes    Assessment & Plan   R and L PCs are clear of BPPV however she had a positive test for L LC canalithiasis BPPV. Treated with L BBQ roll today. Anticipate this clearing her BPPV for possible DC after next visit.     Progress per Plan of Care    Chelita Angel, PT, DPT, CHT, LOVEN  Physical Therapist  [place] license # [id number]

## 2022-07-26 ENCOUNTER — TREATMENT (OUTPATIENT)
Dept: PHYSICAL THERAPY | Facility: CLINIC | Age: 72
End: 2022-07-26

## 2022-07-26 DIAGNOSIS — H81.11 BPPV (BENIGN PAROXYSMAL POSITIONAL VERTIGO), RIGHT: Primary | ICD-10-CM

## 2022-07-26 PROCEDURE — 95992 CANALITH REPOSITIONING PROC: CPT | Performed by: PHYSICAL THERAPIST

## 2022-07-26 NOTE — PROGRESS NOTES
Vestibular Daily Progress Note    Visit#:5  Subjective   I did not need to do the HEP because I have not had anymore symptoms. No dizziness or balance issues.  Objective            Positional Testing  Positional Testing: With infrared goggles  Shawn-Hallpike Right: No nystagmus  Shawn-Hallpike Left: No nystagmus  Horizontal Roll Test Right: No nystagmus  Horizontal Roll Test Left: No nystagmus  Treatment:  (reviewed HEP if symptoms return)             PROCEDURES AND MODALITIES:  Paraffin:    Moist Heat:    Ice:    E-Stim:    Ultrasound:    Ionto:   Traction:    See Exercise, Manual, and Modality Logs for complete treatment.   Other Procedure CPT 53966 minutes 0       Timed Code Treatment: 0 Minutes  Total Treatment Time: 20 Minutes    Assessment & Plan   Patient is currently symptom free. She is testing negative for BPPV in all canals. Appropriate for DC to HEP at this time with all goals met. Will leave chart open for 30 days in case symptoms return.     Progress per Plan of Care    Chelita Angel PT, DPT, CHT, LOVEN  Physical Therapist  [place] license # [id number]

## 2022-09-06 DIAGNOSIS — I10 HYPERTENSION, UNSPECIFIED TYPE: Primary | ICD-10-CM

## 2022-09-12 LAB
ALBUMIN SERPL-MCNC: 4.8 G/DL (ref 3.7–4.7)
ALBUMIN/GLOB SERPL: 2.1 {RATIO} (ref 1.2–2.2)
ALP SERPL-CCNC: 104 IU/L (ref 44–121)
ALT SERPL-CCNC: 15 IU/L (ref 0–32)
APPEARANCE UR: CLEAR
AST SERPL-CCNC: 21 IU/L (ref 0–40)
BACTERIA #/AREA URNS HPF: ABNORMAL /[HPF]
BACTERIA UR CULT: ABNORMAL
BACTERIA UR CULT: ABNORMAL
BASOPHILS # BLD AUTO: 0 X10E3/UL (ref 0–0.2)
BASOPHILS NFR BLD AUTO: 1 %
BILIRUB SERPL-MCNC: 0.3 MG/DL (ref 0–1.2)
BILIRUB UR QL STRIP: NEGATIVE
BUN SERPL-MCNC: 14 MG/DL (ref 8–27)
BUN/CREAT SERPL: 18 (ref 12–28)
CALCIUM SERPL-MCNC: 9.7 MG/DL (ref 8.7–10.3)
CASTS URNS QL MICRO: ABNORMAL /LPF
CHLORIDE SERPL-SCNC: 97 MMOL/L (ref 96–106)
CHOLEST SERPL-MCNC: 203 MG/DL (ref 100–199)
CO2 SERPL-SCNC: 24 MMOL/L (ref 20–29)
COLOR UR: YELLOW
CREAT SERPL-MCNC: 0.78 MG/DL (ref 0.57–1)
EGFRCR-CYS SERPLBLD CKD-EPI 2021: 81 ML/MIN/1.73
EOSINOPHIL # BLD AUTO: 0.1 X10E3/UL (ref 0–0.4)
EOSINOPHIL NFR BLD AUTO: 2 %
EPI CELLS #/AREA URNS HPF: ABNORMAL /HPF (ref 0–10)
ERYTHROCYTE [DISTWIDTH] IN BLOOD BY AUTOMATED COUNT: 13.2 % (ref 11.7–15.4)
GLOBULIN SER CALC-MCNC: 2.3 G/DL (ref 1.5–4.5)
GLUCOSE SERPL-MCNC: 79 MG/DL (ref 65–99)
GLUCOSE UR QL STRIP: NEGATIVE
HCT VFR BLD AUTO: 40.1 % (ref 34–46.6)
HDLC SERPL-MCNC: 55 MG/DL
HGB BLD-MCNC: 13.8 G/DL (ref 11.1–15.9)
HGB UR QL STRIP: NEGATIVE
IMM GRANULOCYTES # BLD AUTO: 0 X10E3/UL (ref 0–0.1)
IMM GRANULOCYTES NFR BLD AUTO: 0 %
KETONES UR QL STRIP: NEGATIVE
LDLC SERPL CALC-MCNC: 135 MG/DL (ref 0–99)
LEUKOCYTE ESTERASE UR QL STRIP: ABNORMAL
LYMPHOCYTES # BLD AUTO: 1.3 X10E3/UL (ref 0.7–3.1)
LYMPHOCYTES NFR BLD AUTO: 32 %
MCH RBC QN AUTO: 32.9 PG (ref 26.6–33)
MCHC RBC AUTO-ENTMCNC: 34.4 G/DL (ref 31.5–35.7)
MCV RBC AUTO: 96 FL (ref 79–97)
MICRO URNS: ABNORMAL
MONOCYTES # BLD AUTO: 0.5 X10E3/UL (ref 0.1–0.9)
MONOCYTES NFR BLD AUTO: 13 %
NEUTROPHILS # BLD AUTO: 2.1 X10E3/UL (ref 1.4–7)
NEUTROPHILS NFR BLD AUTO: 52 %
NITRITE UR QL STRIP: POSITIVE
OTHER ANTIBIOTIC SUSC ISLT: ABNORMAL
PH UR STRIP: 7 [PH] (ref 5–7.5)
PLATELET # BLD AUTO: 215 X10E3/UL (ref 150–450)
POTASSIUM SERPL-SCNC: 4.8 MMOL/L (ref 3.5–5.2)
PROT SERPL-MCNC: 7.1 G/DL (ref 6–8.5)
PROT UR QL STRIP: NEGATIVE
RBC # BLD AUTO: 4.19 X10E6/UL (ref 3.77–5.28)
RBC #/AREA URNS HPF: ABNORMAL /HPF (ref 0–2)
SODIUM SERPL-SCNC: 136 MMOL/L (ref 134–144)
SP GR UR STRIP: 1.02 (ref 1–1.03)
TRIGL SERPL-MCNC: 71 MG/DL (ref 0–149)
URINALYSIS REFLEX: ABNORMAL
UROBILINOGEN UR STRIP-MCNC: 0.2 MG/DL (ref 0.2–1)
VLDLC SERPL CALC-MCNC: 13 MG/DL (ref 5–40)
WBC # BLD AUTO: 4 X10E3/UL (ref 3.4–10.8)
WBC #/AREA URNS HPF: >30 /HPF (ref 0–5)

## 2022-09-13 ENCOUNTER — OFFICE VISIT (OUTPATIENT)
Dept: INTERNAL MEDICINE | Facility: CLINIC | Age: 72
End: 2022-09-13

## 2022-09-13 VITALS
DIASTOLIC BLOOD PRESSURE: 82 MMHG | OXYGEN SATURATION: 99 % | HEART RATE: 61 BPM | WEIGHT: 115 LBS | SYSTOLIC BLOOD PRESSURE: 140 MMHG | TEMPERATURE: 96.9 F | HEIGHT: 66 IN | BODY MASS INDEX: 18.48 KG/M2

## 2022-09-13 DIAGNOSIS — Z00.00 ENCOUNTER FOR ANNUAL WELLNESS VISIT (AWV) IN MEDICARE PATIENT: Primary | ICD-10-CM

## 2022-09-13 DIAGNOSIS — N30.00 ACUTE CYSTITIS WITHOUT HEMATURIA: ICD-10-CM

## 2022-09-13 DIAGNOSIS — S72.141A CLOSED DISPLACED INTERTROCHANTERIC FRACTURE OF RIGHT FEMUR, INITIAL ENCOUNTER: ICD-10-CM
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
## 2022-09-13 DIAGNOSIS — H61.21 IMPACTED CERUMEN OF RIGHT EAR: ICD-10-CM

## 2022-09-13 DIAGNOSIS — M19.042 PRIMARY OSTEOARTHRITIS OF BOTH HANDS: ICD-10-CM

## 2022-09-13 DIAGNOSIS — E55.9 AVITAMINOSIS D: ICD-10-CM

## 2022-09-13 DIAGNOSIS — M81.0 AGE-RELATED OSTEOPOROSIS WITHOUT CURRENT PATHOLOGICAL FRACTURE: ICD-10-CM

## 2022-09-13 DIAGNOSIS — M19.041 PRIMARY OSTEOARTHRITIS OF BOTH HANDS: ICD-10-CM

## 2022-09-13 PROBLEM — S72.143A: Status: RESOLVED | Noted: 2022-04-25 | Resolved: 2022-09-13

## 2022-09-13 PROBLEM — S32.599A FRACTURE OF PUBIC RAMUS: Status: RESOLVED | Noted: 2022-04-25 | Resolved: 2022-09-13

## 2022-09-13 PROBLEM — S32.10XA FRACTURE OF SACRUM (HCC): Status: RESOLVED | Noted: 2022-04-25 | Resolved: 2022-09-13

## 2022-09-13 PROCEDURE — 99214 OFFICE O/P EST MOD 30 MIN: CPT | Performed by: INTERNAL MEDICINE

## 2022-09-13 PROCEDURE — 1126F AMNT PAIN NOTED NONE PRSNT: CPT | Performed by: INTERNAL MEDICINE

## 2022-09-13 PROCEDURE — 1159F MED LIST DOCD IN RCRD: CPT | Performed by: INTERNAL MEDICINE

## 2022-09-13 PROCEDURE — 69209 REMOVE IMPACTED EAR WAX UNI: CPT | Performed by: INTERNAL MEDICINE

## 2022-09-13 PROCEDURE — G0439 PPPS, SUBSEQ VISIT: HCPCS | Performed by: INTERNAL MEDICINE

## 2022-09-13 PROCEDURE — 1170F FXNL STATUS ASSESSED: CPT | Performed by: INTERNAL MEDICINE

## 2022-09-13 RX ORDER — NITROFURANTOIN 25; 75 MG/1; MG/1
100 CAPSULE ORAL 2 TIMES DAILY
Qty: 14 CAPSULE | Refills: 0 | Status: SHIPPED | OUTPATIENT
Start: 2022-09-13 | End: 2022-09-20

## 2022-09-13 NOTE — PATIENT INSTRUCTIONS
Medicare Wellness  Personal Prevention Plan of Service     Date of Office Visit:    Encounter Provider:  Allan Daugherty MD  Place of Service:  [facility]  Patient Name: Jailene Molina  :  1950    As part of the Medicare Wellness portion of your visit today, we are providing you with this personalized preventive plan of services (PPPS). This plan is based upon recommendations of the [place] Preventive Services Task Force (USPSTF) and the Advisory Committee on Immunization Practices (ACIP).    This lists the preventive care services that should be considered, and provides dates of when you are due. Items listed as completed are up-to-date and do not require any further intervention.    Health Maintenance   Topic Date Due    COVID-19 Vaccine (4 - Booster for Pfizer series) 2022    ANNUAL WELLNESS VISIT  2022    INFLUENZA VACCINE  10/01/2022    COLORECTAL CANCER SCREENING  2023    MAMMOGRAM  2023    DXA SCAN  2023    PAP SMEAR  2024    TDAP/TD VACCINES (3 - Td or Tdap) 10/18/2031    HEPATITIS C SCREENING  Completed    Pneumococcal Vaccine 65+  Completed    ZOSTER VACCINE  Completed       No orders of the defined types were placed in this encounter.      Return in about 1 year (around 2023) for Medicare Wellness.
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
## 2022-09-21 DIAGNOSIS — R82.90 ABNORMAL URINE: Primary | ICD-10-CM

## 2022-09-25 LAB
APPEARANCE UR: CLEAR
BACTERIA #/AREA URNS HPF: NORMAL /HPF
BACTERIA UR CULT: NO GROWTH
BACTERIA UR CULT: NORMAL
BILIRUB UR QL STRIP: NEGATIVE
CASTS URNS QL MICRO: NORMAL /LPF
COLOR UR: YELLOW
EPI CELLS #/AREA URNS HPF: NORMAL /HPF (ref 0–10)
GLUCOSE UR QL STRIP: NEGATIVE
HGB UR QL STRIP: NEGATIVE
KETONES UR QL STRIP: NEGATIVE
LEUKOCYTE ESTERASE UR QL STRIP: ABNORMAL
MICRO URNS: ABNORMAL
NITRITE UR QL STRIP: NEGATIVE
PH UR STRIP: 7.5 [PH] (ref 5–7.5)
PROT UR QL STRIP: NEGATIVE
RBC #/AREA URNS HPF: NORMAL /HPF (ref 0–2)
SP GR UR STRIP: 1.01 (ref 1–1.03)
URINALYSIS REFLEX: ABNORMAL
UROBILINOGEN UR STRIP-MCNC: 0.2 MG/DL (ref 0.2–1)
WBC #/AREA URNS HPF: NORMAL /HPF (ref 0–5)

## 2022-10-19 ENCOUNTER — APPOINTMENT (OUTPATIENT)
Dept: WOMENS IMAGING | Facility: HOSPITAL | Age: 72
End: 2022-10-19

## 2022-10-19 PROCEDURE — 77063 BREAST TOMOSYNTHESIS BI: CPT | Performed by: RADIOLOGY

## 2022-10-19 PROCEDURE — 77067 SCR MAMMO BI INCL CAD: CPT | Performed by: RADIOLOGY

## 2022-11-10 ENCOUNTER — TRANSCRIBE ORDERS (OUTPATIENT)
Dept: ADMINISTRATIVE | Facility: HOSPITAL | Age: 72
End: 2022-11-10

## 2022-11-10 DIAGNOSIS — M81.0 SENILE OSTEOPOROSIS: Primary | ICD-10-CM

## 2022-11-16 PROBLEM — M81.0 OSTEOPOROSIS: Status: ACTIVE | Noted: 2022-11-16

## 2022-11-21 ENCOUNTER — INFUSION (OUTPATIENT)
Dept: ONCOLOGY | Facility: HOSPITAL | Age: 72
End: 2022-11-21

## 2022-11-21 VITALS
DIASTOLIC BLOOD PRESSURE: 87 MMHG | HEART RATE: 66 BPM | OXYGEN SATURATION: 100 % | SYSTOLIC BLOOD PRESSURE: 169 MMHG | WEIGHT: 121.8 LBS | RESPIRATION RATE: 18 BRPM | BODY MASS INDEX: 20.29 KG/M2 | HEIGHT: 65 IN | TEMPERATURE: 98.1 F

## 2022-11-21 DIAGNOSIS — M81.0 OSTEOPOROSIS, UNSPECIFIED OSTEOPOROSIS TYPE, UNSPECIFIED PATHOLOGICAL FRACTURE PRESENCE: Primary | ICD-10-CM

## 2022-11-21 PROCEDURE — 25010000002 DENOSUMAB 60 MG/ML SOLUTION PREFILLED SYRINGE: Performed by: OBSTETRICS & GYNECOLOGY

## 2022-11-21 PROCEDURE — 96372 THER/PROPH/DIAG INJ SC/IM: CPT

## 2022-11-21 RX ADMIN — DENOSUMAB 60 MG: 60 INJECTION SUBCUTANEOUS at 14:54

## 2022-12-15 RX ORDER — CELECOXIB 200 MG/1
CAPSULE ORAL
Qty: 90 CAPSULE | Refills: 0 | Status: SHIPPED | OUTPATIENT
Start: 2022-12-15 | End: 2023-03-24

## 2023-03-24 RX ORDER — CELECOXIB 200 MG/1
CAPSULE ORAL
Qty: 90 CAPSULE | Refills: 0 | Status: SHIPPED | OUTPATIENT
Start: 2023-03-24 | End: 2023-03-27

## 2023-03-27 RX ORDER — CELECOXIB 200 MG/1
CAPSULE ORAL
Qty: 90 CAPSULE | Refills: 2 | Status: SHIPPED | OUTPATIENT
Start: 2023-03-27

## 2023-05-23 ENCOUNTER — LAB (OUTPATIENT)
Dept: OTHER | Facility: HOSPITAL | Age: 73
End: 2023-05-23
Payer: MEDICARE

## 2023-05-23 ENCOUNTER — INFUSION (OUTPATIENT)
Dept: ONCOLOGY | Facility: HOSPITAL | Age: 73
End: 2023-05-23
Payer: MEDICARE

## 2023-05-23 VITALS — RESPIRATION RATE: 15 BRPM | BODY MASS INDEX: 20.59 KG/M2 | HEIGHT: 65 IN | WEIGHT: 123.6 LBS | TEMPERATURE: 97.1 F

## 2023-05-23 DIAGNOSIS — M81.0 OSTEOPOROSIS, UNSPECIFIED OSTEOPOROSIS TYPE, UNSPECIFIED PATHOLOGICAL FRACTURE PRESENCE: ICD-10-CM

## 2023-05-23 DIAGNOSIS — M81.0 OSTEOPOROSIS, UNSPECIFIED OSTEOPOROSIS TYPE, UNSPECIFIED PATHOLOGICAL FRACTURE PRESENCE: Primary | ICD-10-CM

## 2023-05-23 LAB
25(OH)D3 SERPL-MCNC: 56.2 NG/ML (ref 30–100)
CALCIUM SPEC-SCNC: 9.8 MG/DL (ref 8.6–10.5)

## 2023-05-23 PROCEDURE — 82306 VITAMIN D 25 HYDROXY: CPT | Performed by: OBSTETRICS & GYNECOLOGY

## 2023-05-23 PROCEDURE — 96372 THER/PROPH/DIAG INJ SC/IM: CPT

## 2023-05-23 PROCEDURE — 82310 ASSAY OF CALCIUM: CPT | Performed by: OBSTETRICS & GYNECOLOGY

## 2023-05-23 PROCEDURE — 25010000002 DENOSUMAB 60 MG/ML SOLUTION PREFILLED SYRINGE: Performed by: OBSTETRICS & GYNECOLOGY

## 2023-05-23 RX ADMIN — DENOSUMAB 60 MG: 60 INJECTION SUBCUTANEOUS at 13:32

## 2023-05-23 NOTE — NURSING NOTE
Arrived  for prolia injection. Indication and side effects reviewed. Denies recent dental work. Labs and medications verified. Prolia administered in  arm without incidence. Instructed to call prescribing MD for any concerns or questions and instructed on how to schedule future appts.  Pt vu and discharged in stable condition.    Lab Results   Component Value Date    CALCIUM 9.8 05/23/2023

## 2023-06-29 ENCOUNTER — TELEPHONE (OUTPATIENT)
Dept: INTERNAL MEDICINE | Facility: CLINIC | Age: 73
End: 2023-06-29

## 2023-06-29 RX ORDER — CELECOXIB 200 MG/1
200 CAPSULE ORAL DAILY
Qty: 90 CAPSULE | Refills: 2 | Status: CANCELLED | OUTPATIENT
Start: 2023-06-29

## 2023-06-29 NOTE — TELEPHONE ENCOUNTER
"Caller: Jailene Molina \"Linda\"    Relationship: Self    Best call back number: [phone]    Requested Prescriptions:   Requested Prescriptions     Pending Prescriptions Disp Refills    celecoxib (CeleBREX) 200 MG capsule 90 capsule 2     Sig: Take 1 capsule by mouth Daily.        Pharmacy where request should be sent: [id number] - [place] [address] [facility] [phone] [facility] [phone]      Last office visit with prescribing clinician: 9/13/2022   Last telemedicine visit with prescribing clinician: Visit date not found   Next office visit with prescribing clinician: 9/18/2023     Does the patient have less than a 3 day supply:  [x] Yes  [] No    Would you like a call back once the refill request has been completed: [x] Yes [] No    If the office needs to give you a call back, can they leave a voicemail: [x] Yes [] No    Shani Pederson   06/29/23 12:35 EDT     " - - -

## 2023-06-29 NOTE — TELEPHONE ENCOUNTER
"Caller: Jailene Molina \"Linda\"    Relationship: Self    Best call back number: [phone]    What is the medical concern/diagnosis: PAINFUL BONES IN LEFT HAND, DISCOMFORT IN RIGHT HAND    What specialty or service is being requested: ORTHOPEDIST     What is the provider, practice or medical service name:[facility]     NOTES-PATIENT IS ALSO NEEDING A ORDER FOR A COLONOSCOPY   "

## 2023-09-08 DIAGNOSIS — M81.0 OSTEOPOROSIS, UNSPECIFIED OSTEOPOROSIS TYPE, UNSPECIFIED PATHOLOGICAL FRACTURE PRESENCE: Primary | ICD-10-CM

## 2023-09-08 DIAGNOSIS — E55.9 AVITAMINOSIS D: ICD-10-CM

## 2023-09-08 DIAGNOSIS — I10 HYPERTENSION, UNSPECIFIED TYPE: ICD-10-CM

## 2023-09-08 DIAGNOSIS — E78.00 ELEVATED LDL CHOLESTEROL LEVEL: ICD-10-CM

## 2023-09-16 LAB
ALBUMIN SERPL-MCNC: 4.5 G/DL (ref 3.5–5.2)
ALBUMIN/GLOB SERPL: 2.3 G/DL
ALP SERPL-CCNC: 43 U/L (ref 39–117)
ALT SERPL-CCNC: 18 U/L (ref 1–33)
APPEARANCE UR: ABNORMAL
AST SERPL-CCNC: 18 U/L (ref 1–32)
BACTERIA #/AREA URNS HPF: ABNORMAL /HPF
BACTERIA UR CULT: ABNORMAL
BACTERIA UR CULT: ABNORMAL
BASOPHILS # BLD AUTO: 0.05 10*3/MM3 (ref 0–0.2)
BASOPHILS NFR BLD AUTO: 1.1 % (ref 0–1.5)
BILIRUB SERPL-MCNC: 0.4 MG/DL (ref 0–1.2)
BILIRUB UR QL STRIP: NEGATIVE
BUN SERPL-MCNC: 16 MG/DL (ref 8–23)
BUN/CREAT SERPL: 23.9 (ref 7–25)
CALCIUM SERPL-MCNC: 9.4 MG/DL (ref 8.6–10.5)
CASTS URNS QL MICRO: ABNORMAL /LPF
CHLORIDE SERPL-SCNC: 100 MMOL/L (ref 98–107)
CHOLEST SERPL-MCNC: 198 MG/DL (ref 0–200)
CO2 SERPL-SCNC: 27.9 MMOL/L (ref 22–29)
COLOR UR: YELLOW
CREAT SERPL-MCNC: 0.67 MG/DL (ref 0.57–1)
EGFRCR SERPLBLD CKD-EPI 2021: 93 ML/MIN/1.73
EOSINOPHIL # BLD AUTO: 0.11 10*3/MM3 (ref 0–0.4)
EOSINOPHIL NFR BLD AUTO: 2.4 % (ref 0.3–6.2)
EPI CELLS #/AREA URNS HPF: ABNORMAL /HPF (ref 0–10)
ERYTHROCYTE [DISTWIDTH] IN BLOOD BY AUTOMATED COUNT: 12.3 % (ref 12.3–15.4)
GLOBULIN SER CALC-MCNC: 2 GM/DL
GLUCOSE SERPL-MCNC: 81 MG/DL (ref 65–99)
GLUCOSE UR QL STRIP: NEGATIVE
HCT VFR BLD AUTO: 38.7 % (ref 34–46.6)
HDLC SERPL-MCNC: 54 MG/DL (ref 40–60)
HGB BLD-MCNC: 13.2 G/DL (ref 12–15.9)
HGB UR QL STRIP: ABNORMAL
IMM GRANULOCYTES # BLD AUTO: 0.01 10*3/MM3 (ref 0–0.05)
IMM GRANULOCYTES NFR BLD AUTO: 0.2 % (ref 0–0.5)
KETONES UR QL STRIP: NEGATIVE
LDLC SERPL CALC-MCNC: 127 MG/DL (ref 0–100)
LEUKOCYTE ESTERASE UR QL STRIP: ABNORMAL
LYMPHOCYTES # BLD AUTO: 1.26 10*3/MM3 (ref 0.7–3.1)
LYMPHOCYTES NFR BLD AUTO: 28 % (ref 19.6–45.3)
MCH RBC QN AUTO: 31.6 PG (ref 26.6–33)
MCHC RBC AUTO-ENTMCNC: 34.1 G/DL (ref 31.5–35.7)
MCV RBC AUTO: 92.6 FL (ref 79–97)
MICRO URNS: ABNORMAL
MONOCYTES # BLD AUTO: 0.57 10*3/MM3 (ref 0.1–0.9)
MONOCYTES NFR BLD AUTO: 12.7 % (ref 5–12)
MUCOUS THREADS URNS QL MICRO: PRESENT /HPF
NEUTROPHILS # BLD AUTO: 2.5 10*3/MM3 (ref 1.7–7)
NEUTROPHILS NFR BLD AUTO: 55.6 % (ref 42.7–76)
NITRITE UR QL STRIP: POSITIVE
NRBC BLD AUTO-RTO: 0 /100 WBC (ref 0–0.2)
OTHER ANTIBIOTIC SUSC ISLT: ABNORMAL
PH UR STRIP: 7 [PH] (ref 5–7.5)
PLATELET # BLD AUTO: 233 10*3/MM3 (ref 140–450)
POTASSIUM SERPL-SCNC: 4.3 MMOL/L (ref 3.5–5.2)
PROT SERPL-MCNC: 6.5 G/DL (ref 6–8.5)
PROT UR QL STRIP: NEGATIVE
RBC # BLD AUTO: 4.18 10*6/MM3 (ref 3.77–5.28)
RBC #/AREA URNS HPF: ABNORMAL /HPF (ref 0–2)
SODIUM SERPL-SCNC: 136 MMOL/L (ref 136–145)
SP GR UR STRIP: 1.01 (ref 1–1.03)
TRIGL SERPL-MCNC: 92 MG/DL (ref 0–150)
TSH SERPL DL<=0.005 MIU/L-ACNC: 1.66 UIU/ML (ref 0.27–4.2)
URINALYSIS REFLEX: ABNORMAL
UROBILINOGEN UR STRIP-MCNC: 0.2 MG/DL (ref 0.2–1)
VLDLC SERPL CALC-MCNC: 17 MG/DL (ref 5–40)
WBC # BLD AUTO: 4.5 10*3/MM3 (ref 3.4–10.8)
WBC #/AREA URNS HPF: >30 /HPF (ref 0–5)

## 2023-09-18 ENCOUNTER — OFFICE VISIT (OUTPATIENT)
Dept: INTERNAL MEDICINE | Facility: CLINIC | Age: 73
End: 2023-09-18
Payer: MEDICARE

## 2023-09-18 ENCOUNTER — TELEPHONE (OUTPATIENT)
Dept: INTERNAL MEDICINE | Facility: CLINIC | Age: 73
End: 2023-09-18

## 2023-09-18 VITALS
SYSTOLIC BLOOD PRESSURE: 116 MMHG | OXYGEN SATURATION: 97 % | WEIGHT: 120.3 LBS | HEIGHT: 65 IN | TEMPERATURE: 97.6 F | DIASTOLIC BLOOD PRESSURE: 74 MMHG | HEART RATE: 67 BPM | BODY MASS INDEX: 20.04 KG/M2

## 2023-09-18 DIAGNOSIS — E55.9 AVITAMINOSIS D: ICD-10-CM

## 2023-09-18 DIAGNOSIS — Z12.11 SCREEN FOR COLON CANCER: ICD-10-CM

## 2023-09-18 DIAGNOSIS — Z23 NEEDS FLU SHOT: ICD-10-CM

## 2023-09-18 DIAGNOSIS — M81.0 AGE-RELATED OSTEOPOROSIS WITHOUT CURRENT PATHOLOGICAL FRACTURE: ICD-10-CM

## 2023-09-18 DIAGNOSIS — M19.042 PRIMARY OSTEOARTHRITIS OF BOTH HANDS: ICD-10-CM

## 2023-09-18 DIAGNOSIS — R26.89 LOSS OF BALANCE: ICD-10-CM

## 2023-09-18 DIAGNOSIS — R82.71 ASYMPTOMATIC BACTERIURIA: ICD-10-CM

## 2023-09-18 DIAGNOSIS — Z00.01 ENCOUNTER FOR GENERAL ADULT MEDICAL EXAMINATION WITH ABNORMAL FINDINGS: ICD-10-CM

## 2023-09-18 DIAGNOSIS — R26.81 GAIT INSTABILITY: ICD-10-CM

## 2023-09-18 DIAGNOSIS — M19.041 PRIMARY OSTEOARTHRITIS OF BOTH HANDS: ICD-10-CM

## 2023-09-18 DIAGNOSIS — Z00.00 ENCOUNTER FOR SUBSEQUENT ANNUAL WELLNESS VISIT (AWV) IN MEDICARE PATIENT: Primary | ICD-10-CM

## 2023-09-18 DIAGNOSIS — N95.2 ATROPHY OF VAGINA: ICD-10-CM

## 2023-09-18 DIAGNOSIS — H61.23 BILATERAL IMPACTED CERUMEN: ICD-10-CM

## 2023-09-18 RX ORDER — CHOLECALCIFEROL (VITAMIN D3) 25 MCG
CAPSULE ORAL
COMMUNITY

## 2023-09-18 NOTE — PATIENT INSTRUCTIONS
Flu/ COVID booster advised this Fall.   Ophthalmology exam due, schedule    Medicare Wellness  Personal Prevention Plan of Service     Date of Office Visit:    Encounter Provider:  Allan Daugherty MD  Place of Service:  [facility]  Patient Name: Jailene Molina  :  1950    As part of the Medicare Wellness portion of your visit today, we are providing you with this personalized preventive plan of services (PPPS). This plan is based upon recommendations of the [place] Preventive Services Task Force (USPSTF) and the Advisory Committee on Immunization Practices (ACIP).    This lists the preventive care services that should be considered, and provides dates of when you are due. Items listed as completed are up-to-date and do not require any further intervention.    Health Maintenance   Topic Date Due    COVID-19 Vaccine (5 - Pfizer series) 2023    COLORECTAL CANCER SCREENING  2023    INFLUENZA VACCINE  10/01/2023    PAP SMEAR  2024    ANNUAL WELLNESS VISIT  2024    MAMMOGRAM  10/19/2024    DXA SCAN  10/19/2024    TDAP/TD VACCINES (3 - Td or Tdap) 10/18/2031    HEPATITIS C SCREENING  Completed    Pneumococcal Vaccine 65+  Completed    ZOSTER VACCINE  Completed       Orders Placed This Encounter   Procedures    Fluzone High-Dose 65+yrs    UA / M With / Rflx Culture([facility] ONLY) - Urine, Clean Catch     Order Specific Question:   Release to patient     Answer:   Routine Release [[id number]]    Ambulatory Referral to Physical Therapy Evaluate and treat     Referral Priority:   Routine     Referral Type:   Physical Therapy     Referral Reason:   Specialty Services Required     Requested Specialty:   Physical Therapy     Number of Visits Requested:   1    Ambulatory Referral For Screening Colonoscopy     Referral Priority:   Routine     Referral Type:   Diagnostic Medical     Referral Reason:   Specialty Services Required     Referred to Provider:    Danilo Garcia MD     Number of Visits Requested:   1       Return in about 1 year (around 9/18/2024) for Medicare Wellness.

## 2023-09-18 NOTE — Clinical Note
Call pt. C-scope due 2023 (last in 2013). .  Rferral placed to Dr. Day to complete colon cancer screen.

## 2023-09-18 NOTE — TELEPHONE ENCOUNTER
Patient informed    ----- Message from Allan Daugherty MD sent at 9/18/2023  3:15 PM EDT -----  Call pt. C-scope due 2023 (last in 2013). .  Rferral placed to Dr. Day to complete colon cancer screen.
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
## 2023-09-24 LAB
APPEARANCE UR: ABNORMAL
BACTERIA #/AREA URNS HPF: ABNORMAL /HPF
BACTERIA UR CULT: ABNORMAL
BACTERIA UR CULT: ABNORMAL
BILIRUB UR QL STRIP: NEGATIVE
CASTS URNS QL MICRO: ABNORMAL /LPF
COLOR UR: YELLOW
EPI CELLS #/AREA URNS HPF: ABNORMAL /HPF (ref 0–10)
GLUCOSE UR QL STRIP: NEGATIVE
HGB UR QL STRIP: ABNORMAL
KETONES UR QL STRIP: NEGATIVE
LEUKOCYTE ESTERASE UR QL STRIP: ABNORMAL
MICRO URNS: ABNORMAL
NITRITE UR QL STRIP: POSITIVE
OTHER ANTIBIOTIC SUSC ISLT: ABNORMAL
PH UR STRIP: 6.5 [PH] (ref 5–7.5)
PROT UR QL STRIP: ABNORMAL
RBC #/AREA URNS HPF: ABNORMAL /HPF (ref 0–2)
SP GR UR STRIP: 1.02 (ref 1–1.03)
URINALYSIS REFLEX: ABNORMAL
UROBILINOGEN UR STRIP-MCNC: 0.2 MG/DL (ref 0.2–1)
WBC #/AREA URNS HPF: >30 /HPF (ref 0–5)

## 2023-09-25 DIAGNOSIS — N30.01 ACUTE CYSTITIS WITH HEMATURIA: Primary | ICD-10-CM

## 2023-09-25 RX ORDER — NITROFURANTOIN 25; 75 MG/1; MG/1
100 CAPSULE ORAL 2 TIMES DAILY
Qty: 14 CAPSULE | Refills: 0 | Status: SHIPPED | OUTPATIENT
Start: 2023-09-25 | End: 2023-10-02

## 2023-10-04 DIAGNOSIS — R82.90 ABNORMAL URINE FINDINGS: Primary | ICD-10-CM

## 2023-10-05 ENCOUNTER — PREP FOR SURGERY (OUTPATIENT)
Dept: OTHER | Facility: HOSPITAL | Age: 73
End: 2023-10-05
Payer: MEDICARE

## 2023-10-05 DIAGNOSIS — Z12.11 SCREEN FOR COLON CANCER: Primary | ICD-10-CM

## 2023-10-06 LAB
APPEARANCE UR: CLEAR
BACTERIA #/AREA URNS HPF: NORMAL /HPF
BILIRUB UR QL STRIP: NEGATIVE
CASTS URNS QL MICRO: NORMAL /LPF
COLOR UR: YELLOW
EPI CELLS #/AREA URNS HPF: NORMAL /HPF (ref 0–10)
GLUCOSE UR QL STRIP: NEGATIVE
HGB UR QL STRIP: NEGATIVE
KETONES UR QL STRIP: NEGATIVE
LEUKOCYTE ESTERASE UR QL STRIP: NEGATIVE
MICRO URNS: NORMAL
MICRO URNS: NORMAL
NITRITE UR QL STRIP: NEGATIVE
PH UR STRIP: 7 [PH] (ref 5–7.5)
PROT UR QL STRIP: NEGATIVE
RBC #/AREA URNS HPF: NORMAL /HPF (ref 0–2)
SP GR UR STRIP: 1.01 (ref 1–1.03)
URINALYSIS REFLEX: NORMAL
UROBILINOGEN UR STRIP-MCNC: 0.2 MG/DL (ref 0.2–1)
WBC #/AREA URNS HPF: NORMAL /HPF (ref 0–5)

## 2023-10-09 ENCOUNTER — TELEPHONE (OUTPATIENT)
Dept: INTERNAL MEDICINE | Facility: CLINIC | Age: 73
End: 2023-10-09
Payer: MEDICARE

## 2023-10-09 NOTE — TELEPHONE ENCOUNTER
Pt returning call to Zaria mcdonough states she thinks it is about recent lab results.    Best call back number [phone]

## 2023-10-13 ENCOUNTER — TELEPHONE (OUTPATIENT)
Dept: INTERNAL MEDICINE | Facility: CLINIC | Age: 73
End: 2023-10-13

## 2023-10-13 ENCOUNTER — TELEPHONE (OUTPATIENT)
Dept: INTERNAL MEDICINE | Facility: CLINIC | Age: 73
End: 2023-10-13
Payer: MEDICARE

## 2023-10-13 ENCOUNTER — HOSPITAL ENCOUNTER (OUTPATIENT)
Dept: PHYSICAL THERAPY | Facility: HOSPITAL | Age: 73
Setting detail: THERAPIES SERIES
Discharge: HOME | End: 2023-10-13
Payer: MEDICARE

## 2023-10-13 DIAGNOSIS — R26.81 UNSTEADINESS: ICD-10-CM

## 2023-10-13 DIAGNOSIS — G89.29 CHRONIC LOW BACK PAIN, UNSPECIFIED BACK PAIN LATERALITY, UNSPECIFIED WHETHER SCIATICA PRESENT: Primary | ICD-10-CM

## 2023-10-13 DIAGNOSIS — R26.89 BALANCE PROBLEM: Primary | ICD-10-CM

## 2023-10-13 DIAGNOSIS — R26.2 DIFFICULTY WALKING: ICD-10-CM

## 2023-10-13 DIAGNOSIS — M54.50 CHRONIC LOW BACK PAIN, UNSPECIFIED BACK PAIN LATERALITY, UNSPECIFIED WHETHER SCIATICA PRESENT: Primary | ICD-10-CM

## 2023-10-13 PROCEDURE — 97530 THERAPEUTIC ACTIVITIES: CPT

## 2023-10-13 PROCEDURE — 97161 PT EVAL LOW COMPLEX 20 MIN: CPT

## 2023-10-13 NOTE — TELEPHONE ENCOUNTER
"Caller: Jailene Molina \"Linda\"    Relationship: Self    Best call back number: [phone]    What specialty or service is being requested: PHYSICAL THERAPY    What is the provider, practice or medical service name: [facility]    Any additional details: PATIENT STATED SHE HAD HER FIRST APPOINTMENT WITH [facility] TODAY AND SHE ADVISED PATIENT TO CALL DR WETZEL OFFICE FOR AN UPDATED REFERRAL.    SHE STATED THE UPDATED REFERRAL NEEDS TO INCLUDE HER LOWER BACK PAIN.    SHE WOULD LIKE A CALL ONCE THIS HAS BEEN SENT.  "

## 2023-10-13 NOTE — TELEPHONE ENCOUNTER
"  Caller: Jailene Molina \"Linda\"    Relationship: Self    Best call back number: [phone]    What was the call regarding: PATIENT STATED [facility] HAS BEEN ATTEMPTING TO CONTACT HER REGARDING TEST RESULTS.    SHE STATED SHE HAS REVIEWED THEM IN HER MYCHART ALONG WITH THE MESSAGES FROM DR HUERTAS.  "
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
## 2023-10-23 ENCOUNTER — TELEPHONE (OUTPATIENT)
Dept: INTERNAL MEDICINE | Facility: CLINIC | Age: 73
End: 2023-10-23

## 2023-10-23 ENCOUNTER — HOSPITAL ENCOUNTER (OUTPATIENT)
Dept: PHYSICAL THERAPY | Facility: HOSPITAL | Age: 73
Discharge: HOME | End: 2023-10-23
Admitting: INTERNAL MEDICINE
Payer: MEDICARE

## 2023-10-23 DIAGNOSIS — R26.2 DIFFICULTY WALKING: ICD-10-CM

## 2023-10-23 DIAGNOSIS — M54.50 CHRONIC MIDLINE LOW BACK PAIN WITHOUT SCIATICA: ICD-10-CM

## 2023-10-23 DIAGNOSIS — G89.29 CHRONIC MIDLINE LOW BACK PAIN WITHOUT SCIATICA: ICD-10-CM

## 2023-10-23 DIAGNOSIS — R26.89 BALANCE PROBLEM: Primary | ICD-10-CM

## 2023-10-23 PROCEDURE — 97110 THERAPEUTIC EXERCISES: CPT

## 2023-10-23 NOTE — TELEPHONE ENCOUNTER
"  Caller: Jailene Molina \"Linda\"    Relationship: Self    Best call back number: [phone]       PATIENT WONDERED IF DR HUERTAS THINKS PATIENT SHOULD GET AN RSV SHOT OR NOT?      SHE JUST RECEIVED HER NEWEST COVID SHOT, TODAY 10/23/223   PLEASE ADVISE   "
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
## 2023-10-30 ENCOUNTER — HOSPITAL ENCOUNTER (OUTPATIENT)
Dept: PHYSICAL THERAPY | Facility: HOSPITAL | Age: 73
Setting detail: THERAPIES SERIES
Discharge: HOME | End: 2023-10-30
Payer: MEDICARE

## 2023-10-30 DIAGNOSIS — M54.50 CHRONIC MIDLINE LOW BACK PAIN WITHOUT SCIATICA: ICD-10-CM

## 2023-10-30 DIAGNOSIS — G89.29 CHRONIC MIDLINE LOW BACK PAIN WITHOUT SCIATICA: ICD-10-CM

## 2023-10-30 DIAGNOSIS — R26.2 DIFFICULTY WALKING: ICD-10-CM

## 2023-10-30 DIAGNOSIS — R26.89 BALANCE PROBLEM: Primary | ICD-10-CM

## 2023-10-30 PROCEDURE — 97110 THERAPEUTIC EXERCISES: CPT
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
## 2023-11-03 ENCOUNTER — TELEPHONE (OUTPATIENT)
Dept: INTERNAL MEDICINE | Facility: CLINIC | Age: 73
End: 2023-11-03
Payer: MEDICARE

## 2023-11-03 NOTE — TELEPHONE ENCOUNTER
"Caller: Jailene Molina \"Linda\"    Relationship to patient: Self    Best call back number: [phone]     Chief complaint: PATIENT CALLING STATING THAT SHE HAS BEEN EXPERIENCING TIGHTNESS IN HER CHEST AND PRESSURE FOR ABOUT A WEEK    Patient directed to call 911 or go to their nearest emergency room.     Patient verbalized understanding: [x] Yes  [] No  If no, why?    Additional notes:HUB DIRECTED PATIENT TO ER     "

## 2023-11-10 ENCOUNTER — HOSPITAL ENCOUNTER (OUTPATIENT)
Dept: PHYSICAL THERAPY | Facility: HOSPITAL | Age: 73
Setting detail: THERAPIES SERIES
Discharge: HOME | End: 2023-11-10
Payer: MEDICARE

## 2023-11-10 DIAGNOSIS — M54.50 CHRONIC MIDLINE LOW BACK PAIN WITHOUT SCIATICA: ICD-10-CM

## 2023-11-10 DIAGNOSIS — G89.29 CHRONIC MIDLINE LOW BACK PAIN WITHOUT SCIATICA: ICD-10-CM

## 2023-11-10 DIAGNOSIS — R26.81 UNSTEADINESS: ICD-10-CM

## 2023-11-10 DIAGNOSIS — R26.2 DIFFICULTY WALKING: ICD-10-CM

## 2023-11-10 DIAGNOSIS — R26.89 BALANCE PROBLEM: Primary | ICD-10-CM

## 2023-11-10 PROBLEM — Z12.11 SCREEN FOR COLON CANCER: Status: ACTIVE | Noted: 2023-10-05

## 2023-11-10 PROCEDURE — 97110 THERAPEUTIC EXERCISES: CPT
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
## 2023-11-13 ENCOUNTER — HOSPITAL ENCOUNTER (OUTPATIENT)
Dept: PHYSICAL THERAPY | Facility: HOSPITAL | Age: 73
Setting detail: THERAPIES SERIES
Discharge: HOME | End: 2023-11-13
Payer: MEDICARE

## 2023-11-13 DIAGNOSIS — G89.29 CHRONIC MIDLINE LOW BACK PAIN WITHOUT SCIATICA: ICD-10-CM

## 2023-11-13 DIAGNOSIS — R26.2 DIFFICULTY WALKING: ICD-10-CM

## 2023-11-13 DIAGNOSIS — R26.81 UNSTEADINESS: ICD-10-CM

## 2023-11-13 DIAGNOSIS — R26.89 BALANCE PROBLEM: Primary | ICD-10-CM

## 2023-11-13 DIAGNOSIS — M54.50 CHRONIC MIDLINE LOW BACK PAIN WITHOUT SCIATICA: ICD-10-CM

## 2023-11-13 PROCEDURE — 97110 THERAPEUTIC EXERCISES: CPT
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
## 2023-11-24 ENCOUNTER — HOSPITAL ENCOUNTER (OUTPATIENT)
Dept: PHYSICAL THERAPY | Facility: HOSPITAL | Age: 73
Setting detail: THERAPIES SERIES
Discharge: HOME | End: 2023-11-24
Payer: MEDICARE

## 2023-11-24 DIAGNOSIS — M54.50 CHRONIC MIDLINE LOW BACK PAIN WITHOUT SCIATICA: ICD-10-CM

## 2023-11-24 DIAGNOSIS — G89.29 CHRONIC MIDLINE LOW BACK PAIN WITHOUT SCIATICA: ICD-10-CM

## 2023-11-24 DIAGNOSIS — R26.2 DIFFICULTY WALKING: ICD-10-CM

## 2023-11-24 DIAGNOSIS — R26.89 BALANCE PROBLEM: Primary | ICD-10-CM

## 2023-11-24 PROCEDURE — 97110 THERAPEUTIC EXERCISES: CPT
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
## 2023-11-27 ENCOUNTER — TELEPHONE (OUTPATIENT)
Dept: INTERNAL MEDICINE | Facility: CLINIC | Age: 73
End: 2023-11-27
Payer: MEDICARE

## 2023-11-27 NOTE — TELEPHONE ENCOUNTER
"  Caller: Jailene Molina \"Linda\"    Relationship: Self    Best call back number: [phone]     Which medication are you concerned about: CELEBREX    Who prescribed you this medication: DR HEURTAS    What are your concerns: PATIENT STATES THAT SHE HAD BEEN HAVING CHEST PAIN AND PRESSURE AND SHE NOTICED THESE ARE SIDE EFFECTS OF THIS MEDICATION. THE SYMPTOMS HAVE GONE AWAY BUT PLEASE ADVISE IF SHE CAN SWITCH TO A NEW MEDICATION.     "

## 2023-11-27 NOTE — TELEPHONE ENCOUNTER
Celebrex can irritate the stomach and esophagus AND it can raise blood pressure; both can lead to chest pressure sx. However, pt has been on this med for some time from rheum and now has chest pressure sx.  Cannot just assume that Celebrex is cause. Needs to be eval'd in office for chest pressure events. OK for Tylenol 1000mg q 8hours for pain control until assessment in office.

## 2023-11-29 ENCOUNTER — APPOINTMENT (OUTPATIENT)
Dept: OTHER | Facility: HOSPITAL | Age: 73
End: 2023-11-29
Payer: MEDICARE

## 2023-11-29 ENCOUNTER — LAB (OUTPATIENT)
Dept: OTHER | Facility: HOSPITAL | Age: 73
End: 2023-11-29
Payer: MEDICARE

## 2023-11-29 ENCOUNTER — INFUSION (OUTPATIENT)
Dept: ONCOLOGY | Facility: HOSPITAL | Age: 73
End: 2023-11-29
Payer: MEDICARE

## 2023-11-29 VITALS — TEMPERATURE: 97.1 F | RESPIRATION RATE: 15 BRPM | BODY MASS INDEX: 20.02 KG/M2 | HEIGHT: 65 IN

## 2023-11-29 DIAGNOSIS — M81.0 OSTEOPOROSIS, UNSPECIFIED OSTEOPOROSIS TYPE, UNSPECIFIED PATHOLOGICAL FRACTURE PRESENCE: Primary | ICD-10-CM

## 2023-11-29 DIAGNOSIS — M81.0 OSTEOPOROSIS, UNSPECIFIED OSTEOPOROSIS TYPE, UNSPECIFIED PATHOLOGICAL FRACTURE PRESENCE: ICD-10-CM

## 2023-11-29 LAB — CALCIUM SPEC-SCNC: 9.3 MG/DL (ref 8.6–10.5)

## 2023-11-29 PROCEDURE — 25010000002 DENOSUMAB 60 MG/ML SOLUTION PREFILLED SYRINGE: Performed by: OBSTETRICS & GYNECOLOGY

## 2023-11-29 PROCEDURE — 96372 THER/PROPH/DIAG INJ SC/IM: CPT

## 2023-11-29 PROCEDURE — 82310 ASSAY OF CALCIUM: CPT | Performed by: OBSTETRICS & GYNECOLOGY

## 2023-11-29 RX ADMIN — DENOSUMAB 60 MG: 60 INJECTION SUBCUTANEOUS at 14:50

## 2023-11-29 NOTE — NURSING NOTE
Arrived  for prolia injection. Indication and side effects reviewed. Denies recent dental work. Labs and medications verified. Prolia administered in  arm without incidence. Instructed to call prescribing MD for any concerns or questions and instructed on how to schedule future appts.  Pt vu and discharged in stable condition.     Lab Results   Component Value Date    CALCIUM 9.3 11/29/2023

## 2023-12-28 ENCOUNTER — ANESTHESIA (OUTPATIENT)
Dept: GASTROENTEROLOGY | Facility: HOSPITAL | Age: 73
End: 2023-12-28
Payer: MEDICARE

## 2023-12-28 ENCOUNTER — ANESTHESIA EVENT (OUTPATIENT)
Dept: GASTROENTEROLOGY | Facility: HOSPITAL | Age: 73
End: 2023-12-28
Payer: MEDICARE

## 2023-12-28 ENCOUNTER — HOSPITAL ENCOUNTER (OUTPATIENT)
Facility: HOSPITAL | Age: 73
Setting detail: HOSPITAL OUTPATIENT SURGERY
Discharge: HOME | End: 2023-12-28
Attending: SURGERY | Admitting: SURGERY
Payer: MEDICARE

## 2023-12-28 ENCOUNTER — TELEPHONE (OUTPATIENT)
Dept: SURGERY | Facility: CLINIC | Age: 73
End: 2023-12-28
Payer: MEDICARE

## 2023-12-28 VITALS
HEART RATE: 57 BPM | DIASTOLIC BLOOD PRESSURE: 85 MMHG | WEIGHT: 124 LBS | SYSTOLIC BLOOD PRESSURE: 148 MMHG | OXYGEN SATURATION: 99 % | BODY MASS INDEX: 19.93 KG/M2 | RESPIRATION RATE: 18 BRPM | HEIGHT: 66 IN

## 2023-12-28 DIAGNOSIS — Z12.11 SCREEN FOR COLON CANCER: Primary | ICD-10-CM

## 2023-12-28 PROCEDURE — 25810000003 LACTATED RINGERS PER 1000 ML: Performed by: SURGERY

## 2023-12-28 PROCEDURE — 25010000002 PROPOFOL 10 MG/ML EMULSION: Performed by: NURSE ANESTHETIST, CERTIFIED REGISTERED

## 2023-12-28 PROCEDURE — 25810000003 LACTATED RINGERS PER 1000 ML: Performed by: NURSE ANESTHETIST, CERTIFIED REGISTERED

## 2023-12-28 PROCEDURE — G0104 CA SCREEN;FLEXI SIGMOIDSCOPE: HCPCS | Performed by: SURGERY

## 2023-12-28 PROCEDURE — S0260 H&P FOR SURGERY: HCPCS | Performed by: SURGERY

## 2023-12-28 RX ORDER — IBUPROFEN 200 MG
200 TABLET ORAL EVERY 6 HOURS PRN
COMMUNITY

## 2023-12-28 RX ORDER — PROPOFOL 10 MG/ML
VIAL (ML) INTRAVENOUS AS NEEDED
Status: DISCONTINUED | OUTPATIENT
Start: 2023-12-28 | End: 2023-12-28 | Stop reason: SURG

## 2023-12-28 RX ORDER — LIDOCAINE HYDROCHLORIDE 20 MG/ML
INJECTION, SOLUTION INFILTRATION; PERINEURAL AS NEEDED
Status: DISCONTINUED | OUTPATIENT
Start: 2023-12-28 | End: 2023-12-28 | Stop reason: SURG

## 2023-12-28 RX ORDER — SODIUM CHLORIDE, SODIUM LACTATE, POTASSIUM CHLORIDE, CALCIUM CHLORIDE 600; 310; 30; 20 MG/100ML; MG/100ML; MG/100ML; MG/100ML
INJECTION, SOLUTION INTRAVENOUS CONTINUOUS PRN
Status: DISCONTINUED | OUTPATIENT
Start: 2023-12-28 | End: 2023-12-28 | Stop reason: SURG

## 2023-12-28 RX ORDER — SODIUM CHLORIDE, SODIUM LACTATE, POTASSIUM CHLORIDE, CALCIUM CHLORIDE 600; 310; 30; 20 MG/100ML; MG/100ML; MG/100ML; MG/100ML
30 INJECTION, SOLUTION INTRAVENOUS CONTINUOUS PRN
Status: DISCONTINUED | OUTPATIENT
Start: 2023-12-28 | End: 2023-12-28 | Stop reason: HOSPADM

## 2023-12-28 RX ADMIN — PROPOFOL 100 MG: 10 INJECTION, EMULSION INTRAVENOUS at 08:00

## 2023-12-28 RX ADMIN — SODIUM CHLORIDE, POTASSIUM CHLORIDE, SODIUM LACTATE AND CALCIUM CHLORIDE: 600; 310; 30; 20 INJECTION, SOLUTION INTRAVENOUS at 07:57

## 2023-12-28 RX ADMIN — SODIUM CHLORIDE, POTASSIUM CHLORIDE, SODIUM LACTATE AND CALCIUM CHLORIDE 30 ML/HR: 600; 310; 30; 20 INJECTION, SOLUTION INTRAVENOUS at 07:40

## 2023-12-28 RX ADMIN — PROPOFOL 160 MCG/KG/MIN: 10 INJECTION, EMULSION INTRAVENOUS at 08:00

## 2023-12-28 RX ADMIN — LIDOCAINE HYDROCHLORIDE 60 MG: 20 INJECTION, SOLUTION INFILTRATION; PERINEURAL at 08:00

## 2023-12-28 NOTE — OP NOTE
Surgeon:     Phillip Hernández Jr., M.D.    Preoperative Diagnosis:     Need for screening colonoscopy    Postoperative Diagnosis:     Poor bowel prep    Procedure Performed:     Flexible sigmoidoscopy    Indications:     The patient is a pleasant 73-year-old female who presents for screening colonoscopy.  The patient understands the indications, alternatives, risks, and benefits of the procedure and wishes to proceed.    Procedure:     The patient was identified, taken to the endoscopy suite, and place in the left side down decubitus procedure.  The patient underwent a MAC anesthesia and was appropriately monitored through the case by the anesthesia personnel.  A rectal examination was performed that was normal except for the recovery of solid stool.  The colonoscope was introduced into the rectum and advanced very carefully into the sigmoid colon with a very poor prep throughout.  It was felt that visualization of mucosal surfaces would not be possible.  The scope was advanced to 30 cm and then withdrawn.  The patient tolerated procedure well.    Findings:    There is a very poor bowel prep.    Recommendations:     1.  She will need to bowel prep again with a more aggressive prep and then proceed with a colonoscopy.  Will schedule repeat colonoscopy at a time that is convenient for her.    Phillip Hernández Jr., M.D.

## 2023-12-28 NOTE — ANESTHESIA POSTPROCEDURE EVALUATION
Patient: Jailene Molina    Procedure Summary       Date: 12/28/23 Room / Location:  [facility]    Anesthesia Start: 0757 Anesthesia Stop: 0813    Procedure: FLEXIBLE SIGMOIDOSCOPY to sigmoid colon Diagnosis:       Screen for colon cancer      (Screen for colon cancer [Z12.11])    Surgeons: Phillip Hernández Jr., MD Provider: Eduardo Moore MD    Anesthesia Type: MAC ASA Status: 2            Anesthesia Type: MAC    Vitals  Vitals Value Taken Time   /85 12/28/23 0833   Temp     Pulse 56 12/28/23 0837   Resp 18 12/28/23 0833   SpO2 99 % 12/28/23 0837   Vitals shown include unfiled device data.        Post Anesthesia Care and Evaluation    Patient location during evaluation: bedside  Patient participation: complete - patient participated  Level of consciousness: awake and alert  Pain management: adequate    Airway patency: patent  Anesthetic complications: No anesthetic complications  PONV Status: controlled  Cardiovascular status: blood pressure returned to baseline and acceptable  Respiratory status: acceptable  Hydration status: acceptable

## 2023-12-28 NOTE — TELEPHONE ENCOUNTER
----- Message from Rajwinder Yao sent at 12/28/2023  8:56 AM EST -----  Regarding: FW: Schedule colonoscopy    ----- Message -----  From: Phillip Hernández Jr., MD  Sent: 12/28/2023   8:16 AM EST  To: Rajwinder Yao  Subject: Schedule colonoscopy                             This patient had a very poor bowel prep and will need to be rescheduled with the GoLytely prep.  Thanks
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
## 2023-12-28 NOTE — ANESTHESIA PREPROCEDURE EVALUATION
Anesthesia Evaluation     NPO Solid Status: > 8 hours             Airway   Mallampati: II  TM distance: >3 FB  Neck ROM: full  no difficulty expected  Dental - normal exam     Pulmonary - normal exam   Cardiovascular - normal exam        Neuro/Psych  (+) psychiatric history  GI/Hepatic/Renal/Endo      Musculoskeletal     Abdominal    Substance History      OB/GYN          Other                    Anesthesia Plan    ASA 2     MAC       Anesthetic plan, risks, benefits, and alternatives have been provided, discussed and informed consent has been obtained with: patient.    CODE STATUS:

## 2024-02-01 ENCOUNTER — ANESTHESIA (OUTPATIENT)
Dept: GASTROENTEROLOGY | Facility: HOSPITAL | Age: 74
End: 2024-02-01
Payer: MEDICARE

## 2024-02-01 ENCOUNTER — HOSPITAL ENCOUNTER (OUTPATIENT)
Facility: HOSPITAL | Age: 74
Setting detail: HOSPITAL OUTPATIENT SURGERY
Discharge: HOME | End: 2024-02-01
Attending: SURGERY | Admitting: SURGERY
Payer: MEDICARE

## 2024-02-01 ENCOUNTER — ANESTHESIA EVENT (OUTPATIENT)
Dept: GASTROENTEROLOGY | Facility: HOSPITAL | Age: 74
End: 2024-02-01
Payer: MEDICARE

## 2024-02-01 VITALS
DIASTOLIC BLOOD PRESSURE: 69 MMHG | OXYGEN SATURATION: 100 % | BODY MASS INDEX: 20.99 KG/M2 | WEIGHT: 126 LBS | HEIGHT: 65 IN | RESPIRATION RATE: 16 BRPM | SYSTOLIC BLOOD PRESSURE: 118 MMHG | HEART RATE: 48 BPM

## 2024-02-01 PROCEDURE — 25810000003 LACTATED RINGERS PER 1000 ML: Performed by: SURGERY

## 2024-02-01 PROCEDURE — 25010000002 PROPOFOL 10 MG/ML EMULSION: Performed by: NURSE ANESTHETIST, CERTIFIED REGISTERED

## 2024-02-01 PROCEDURE — G0121 COLON CA SCRN NOT HI RSK IND: HCPCS | Performed by: SURGERY

## 2024-02-01 PROCEDURE — S0260 H&P FOR SURGERY: HCPCS | Performed by: SURGERY

## 2024-02-01 RX ORDER — LIDOCAINE HYDROCHLORIDE 20 MG/ML
INJECTION, SOLUTION INFILTRATION; PERINEURAL AS NEEDED
Status: DISCONTINUED | OUTPATIENT
Start: 2024-02-01 | End: 2024-02-01 | Stop reason: SURG

## 2024-02-01 RX ORDER — PROPOFOL 10 MG/ML
VIAL (ML) INTRAVENOUS CONTINUOUS PRN
Status: DISCONTINUED | OUTPATIENT
Start: 2024-02-01 | End: 2024-02-01 | Stop reason: SURG

## 2024-02-01 RX ORDER — SODIUM CHLORIDE, SODIUM LACTATE, POTASSIUM CHLORIDE, CALCIUM CHLORIDE 600; 310; 30; 20 MG/100ML; MG/100ML; MG/100ML; MG/100ML
1000 INJECTION, SOLUTION INTRAVENOUS CONTINUOUS
Status: DISCONTINUED | OUTPATIENT
Start: 2024-02-01 | End: 2024-02-01 | Stop reason: HOSPADM

## 2024-02-01 RX ORDER — SODIUM CHLORIDE 0.9 % (FLUSH) 0.9 %
10 SYRINGE (ML) INJECTION AS NEEDED
Status: DISCONTINUED | OUTPATIENT
Start: 2024-02-01 | End: 2024-02-01 | Stop reason: HOSPADM

## 2024-02-01 RX ADMIN — SODIUM CHLORIDE, POTASSIUM CHLORIDE, SODIUM LACTATE AND CALCIUM CHLORIDE 1000 ML: 600; 310; 30; 20 INJECTION, SOLUTION INTRAVENOUS at 09:52

## 2024-02-01 RX ADMIN — PROPOFOL 180 MCG/KG/MIN: 10 INJECTION, EMULSION INTRAVENOUS at 11:02

## 2024-02-01 RX ADMIN — PROPOFOL 100 MG: 10 INJECTION, EMULSION INTRAVENOUS at 11:01

## 2024-02-01 RX ADMIN — LIDOCAINE HYDROCHLORIDE 60 MG: 20 INJECTION, SOLUTION INFILTRATION; PERINEURAL at 11:02

## 2024-02-01 NOTE — OP NOTE
Surgeon:     Phillip Hernández Jr., M.D.    Preoperative Diagnosis:     Need for screening colonoscopy    Postoperative Diagnosis:     Diverticulosis    Procedure Performed:     Colonoscopy    Indications:     The patient is a pleasant 73-year-old female who presents for screening colonoscopy.  The patient understands the indications, alternatives, risks, and benefits of the procedure and wishes to proceed.    Procedure:     The patient was identified, taken to the endoscopy suite, and place in the left side down decubitus procedure.  The patient underwent a MAC anesthesia and was appropriately monitored through the case by the anesthesia personnel.  A rectal exam was performed that was normal.  The colonoscope was introduced into the rectum and advanced very carefully to the cecum that was identified by the cecal strap, ileocecal valve, and the appendiceal orifice.  The scope was then slowly withdrawn with careful circumferential examination of the mucosa performed.  The bowel prep was good allowing adequate visualization of mucosal surfaces.  The scope was withdrawn.  The patient tolerated the procedure well and was transferred the recovery area in stable condition.     Findings:    There is diverticulosis involving the sigmoid colon with no inflammatory changes.    Recommendations:     1.  High-fiber diet.  2.  Repeat colonoscopy only if symptoms develop.    Phillip Hernández Jr., M.D.
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
## 2024-02-01 NOTE — ANESTHESIA POSTPROCEDURE EVALUATION
Patient: Jailene Molina    Procedure Summary       Date: 02/01/24 Room / Location: [facility] /  [facility]    Anesthesia Start: 1059 Anesthesia Stop: 1149    Procedure: COLONOSCOPY into cecum and TI Diagnosis:       Screen for colon cancer      (Screen for colon cancer [Z12.11])    Surgeons: Phillip Hernández Jr., MD Provider: Boston Alba MD    Anesthesia Type: MAC ASA Status: 2            Anesthesia Type: MAC    Vitals  Vitals Value Taken Time   /69 02/01/24 1207   Temp     Pulse 48 02/01/24 1209   Resp 16 02/01/24 1206   SpO2 98 % 02/01/24 1209   Vitals shown include unfiled device data.        Post Anesthesia Care and Evaluation    Patient location during evaluation: PACU  Patient participation: complete - patient participated  Level of consciousness: awake and alert  Pain management: adequate    Airway patency: patent  Anesthetic complications: No anesthetic complications    Cardiovascular status: acceptable  Respiratory status: acceptable  Hydration status: acceptable    Comments: --------------------            02/01/24               1206     --------------------   BP:       118/69     Pulse:    (!) 48     Resp:       16       SpO2:      100%     --------------------

## 2024-02-01 NOTE — ANESTHESIA PREPROCEDURE EVALUATION
Anesthesia Evaluation     Patient summary reviewed and Nursing notes reviewed                Airway   Mallampati: II  TM distance: >3 FB  Neck ROM: full  Dental      Pulmonary    (+) a smoker Former,  Cardiovascular - negative cardio ROS    ECG reviewed  Rhythm: regular  Rate: normal        Neuro/Psych  (+) psychiatric history Anxiety  GI/Hepatic/Renal/Endo - negative ROS     Musculoskeletal     Abdominal    Substance History   (+) alcohol use     OB/GYN negative ob/gyn ROS         Other   arthritis,                   Anesthesia Plan    ASA 2     MAC     intravenous induction     Anesthetic plan, risks, benefits, and alternatives have been provided, discussed and informed consent has been obtained with: patient.    Plan discussed with CRNA.    CODE STATUS:

## 2024-06-04 ENCOUNTER — LAB (OUTPATIENT)
Dept: OTHER | Facility: HOSPITAL | Age: 74
End: 2024-06-04
Payer: MEDICARE

## 2024-06-04 ENCOUNTER — INFUSION (OUTPATIENT)
Dept: ONCOLOGY | Facility: HOSPITAL | Age: 74
End: 2024-06-04
Payer: MEDICARE

## 2024-06-04 DIAGNOSIS — M81.0 OSTEOPOROSIS, UNSPECIFIED OSTEOPOROSIS TYPE, UNSPECIFIED PATHOLOGICAL FRACTURE PRESENCE: Primary | ICD-10-CM

## 2024-06-04 DIAGNOSIS — M81.0 OSTEOPOROSIS, UNSPECIFIED OSTEOPOROSIS TYPE, UNSPECIFIED PATHOLOGICAL FRACTURE PRESENCE: ICD-10-CM

## 2024-06-04 LAB — CALCIUM SPEC-SCNC: 9.7 MG/DL (ref 8.6–10.5)

## 2024-06-04 PROCEDURE — 82310 ASSAY OF CALCIUM: CPT | Performed by: OBSTETRICS & GYNECOLOGY

## 2024-06-04 PROCEDURE — 25010000002 DENOSUMAB 60 MG/ML SOLUTION PREFILLED SYRINGE: Performed by: OBSTETRICS & GYNECOLOGY

## 2024-06-04 PROCEDURE — 96372 THER/PROPH/DIAG INJ SC/IM: CPT

## 2024-06-04 RX ADMIN — DENOSUMAB 60 MG: 60 INJECTION SUBCUTANEOUS at 13:31

## 2024-06-07 ENCOUNTER — OFFICE VISIT (OUTPATIENT)
Dept: INTERNAL MEDICINE | Facility: CLINIC | Age: 74
End: 2024-06-07
Payer: MEDICARE

## 2024-06-07 VITALS
HEIGHT: 65 IN | SYSTOLIC BLOOD PRESSURE: 132 MMHG | DIASTOLIC BLOOD PRESSURE: 84 MMHG | BODY MASS INDEX: 21.16 KG/M2 | HEART RATE: 70 BPM | WEIGHT: 127 LBS | OXYGEN SATURATION: 98 %

## 2024-06-07 DIAGNOSIS — M54.41 LOW BACK PAIN WITH BILATERAL SCIATICA, UNSPECIFIED BACK PAIN LATERALITY, UNSPECIFIED CHRONICITY: Primary | ICD-10-CM

## 2024-06-07 DIAGNOSIS — M54.42 LOW BACK PAIN WITH BILATERAL SCIATICA, UNSPECIFIED BACK PAIN LATERALITY, UNSPECIFIED CHRONICITY: Primary | ICD-10-CM

## 2024-06-07 DIAGNOSIS — M48.061 SPINAL STENOSIS AT L4-L5 LEVEL: ICD-10-CM

## 2024-06-07 DIAGNOSIS — M19.041 PRIMARY OSTEOARTHRITIS OF BOTH HANDS: ICD-10-CM

## 2024-06-07 DIAGNOSIS — M19.042 PRIMARY OSTEOARTHRITIS OF BOTH HANDS: ICD-10-CM

## 2024-06-07 PROBLEM — Z12.11 SCREEN FOR COLON CANCER: Status: RESOLVED | Noted: 2023-10-05 | Resolved: 2024-06-07

## 2024-06-07 PROCEDURE — 1159F MED LIST DOCD IN RCRD: CPT | Performed by: INTERNAL MEDICINE

## 2024-06-07 PROCEDURE — 1160F RVW MEDS BY RX/DR IN RCRD: CPT | Performed by: INTERNAL MEDICINE

## 2024-06-07 PROCEDURE — 1125F AMNT PAIN NOTED PAIN PRSNT: CPT | Performed by: INTERNAL MEDICINE

## 2024-06-07 PROCEDURE — 99213 OFFICE O/P EST LOW 20 MIN: CPT | Performed by: INTERNAL MEDICINE

## 2024-06-07 NOTE — PROGRESS NOTES
Subjective     Jailene Molina is a 73 y.o. female who presents with   Chief Complaint   Patient presents with    Back Pain       Back Pain     The following data was reviewed by: Radha Adam MD on 06/07/2024:    Data reviewed : Radiologic studies MRI L/S spine        C/o back pain several months ago.  Hurts in the morning.  Radiates to thighs.  Walking helps.  Better now.  Known spinal stenosis.      Bilateral hand OA is an ongoing bother to her.     Review of Systems   Musculoskeletal:  Positive for back pain.       The following portions of the patient's history were reviewed and updated as appropriate: allergies, current medications and problem list.    Patient Active Problem List    Diagnosis Date Noted    Screen for colon cancer 10/05/2023    Osteoporosis 11/16/2022    Anxiety 08/27/2019    Onychomycosis of toenail 08/20/2018     Note Last Updated: 8/20/2018     B great toes      Osteoarthritis of both hands 08/05/2016     Note Last Updated: 5/28/2021     With AM stiffness < 30 minutes; noted DIP joint hypertrophy  Follows with rheumatologist.       Age-related osteoporosis without current pathological fracture 07/28/2016     Note Last Updated: 8/31/2020     Description: mild at hip; artificially high FRAX in 2015; urine NTX borderline elevated. No meds started. Repeat DEXA in 7/2017; marked decline in denisty on 11/2019 DEXA; hip pain on L with Fosamax, pt D/C'd med. Placed on Fosteum per Dr. Chau.       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 90 capsule 0    Cholecalciferol (Vitamin D-3) 25 MCG (1000 UT) capsule Take  by mouth.      Dietary Management Product (FOSTEUM PLUS PO)  "Take  by mouth. Take 1 twice daily      ibuprofen (ADVIL,MOTRIN) 200 MG tablet Take 1 tablet by mouth Every 6 (Six) Hours As Needed for Mild Pain.       No current facility-administered medications on file prior to visit.       Objective     /84   Pulse 70   Ht 165.1 cm (65\")   Wt 57.6 kg (127 lb)   SpO2 98%   BMI 21.13 kg/m²     Physical Exam  Constitutional:       Appearance: She is well-developed.   HENT:      Head: Normocephalic and atraumatic.   Pulmonary:      Effort: Pulmonary effort is normal.   Musculoskeletal:      Lumbar back: No swelling, deformity, tenderness or bony tenderness.      Comments: Prominent Herberden's nodes.     Neurological:      Mental Status: She is alert and oriented to person, place, and time.   Psychiatric:         Behavior: Behavior normal.     X-rays of the l/s spine  performed today for following indication:   pain.  X-ray reveal DDD.  There is no available x-ray for comparison.  X-ray sent to radiology for official interpretation and findings.        Assessment & Plan   Diagnoses and all orders for this visit:    1. Low back pain with bilateral sciatica, unspecified back pain laterality, unspecified chronicity (Primary)  -     XR Spine Lumbar Complete 4+VW    2. Primary osteoarthritis of both hands        Discussion    Patient presents with acute on chronic low back pain secondary to spinal stenosis and DDD.  I discussed with the patient a trial of conservative management with:   tylenol and physical therapy.  Let me know if not feeling better over the next several weeks or if there is any change in symptoms.  I offered repeat MRI is she would like to consider epidurals.    Chronic hand OA.  I discussed with the patient a trial of conservative management with:   rest and voltaren gel.  Let me know if not feeling better over the next several weeks or if there is any change in symptoms.  We discussed the risks/benefits of chronic NSAID use.  Risks include elevated blood " pressure, damage to kidneys, stomach ulceration, and increased risk for cardiovascular events.             Future Appointments   Date Time Provider Department Center   9/19/2024 11:30 AM [facility]   9/24/2024  1:00 PM Allan Daugherty MD [facility]   12/5/2024  2:30 PM REFERRED INJECTION CHAIR [facility]

## 2024-06-17 ENCOUNTER — TELEPHONE (OUTPATIENT)
Dept: INTERNAL MEDICINE | Facility: CLINIC | Age: 74
End: 2024-06-17
Payer: MEDICARE

## 2024-06-17 NOTE — TELEPHONE ENCOUNTER
"Caller: Jailene Molina \"Linda\"    Relationship to patient: Self    Best call back number:      Patient is needing: PATIENT STATES SHE IS RETURNING A CALL TO [facility] FOR HER XRAY RESULTS ON HER BACK.  SHE STATES SHE JUST CALLED AND WAS SENT TO AN EXTENSION THAT JUST RANG SO SHE IS CALLING BACK.  PATIENT WOULD LIKE A CALLBACK WITH THOSE RESULTS.      " 5

## 2024-06-18 NOTE — TELEPHONE ENCOUNTER
Patient wants to speak with a medical assistant about the x-ray. She is confused on what to do. She said she is pressed for time and may not be able to make an appointment with another provider.     Best call back # [phone]

## 2024-06-18 NOTE — TELEPHONE ENCOUNTER
Called patient and left another detailed message. Asked her to call and schedule with any PCP this week.

## 2024-07-03 ENCOUNTER — TELEPHONE (OUTPATIENT)
Dept: INTERNAL MEDICINE | Facility: CLINIC | Age: 74
End: 2024-07-03
Payer: MEDICARE

## 2024-07-03 DIAGNOSIS — R93.5 ABNORMAL X-RAY OF ABDOMEN: Primary | ICD-10-CM

## 2024-07-03 NOTE — TELEPHONE ENCOUNTER
"  Caller: Jailene Molina \"Linda\"    Relationship: Self    Best call back number: [phone]     Who are you requesting to speak with (clinical staff, provider,  specific staff member): DR. BERNSTEIN    What was the call regarding: WANTS TO SPEAK TO DR. BERNSTEIN ABOUT THE BACK PAIN AND NEXT STEPS OF GETTING A CT SCAN       "

## 2024-07-03 NOTE — TELEPHONE ENCOUNTER
I d/w patient.  CT of the abdomen/pelvis is ordered to evaluate dilated loops of bowel seen on CT.  SLW

## 2024-07-05 ENCOUNTER — APPOINTMENT (OUTPATIENT)
Dept: GENERAL RADIOLOGY | Facility: HOSPITAL | Age: 74
End: 2024-07-05
Payer: MEDICARE

## 2024-07-05 ENCOUNTER — HOSPITAL ENCOUNTER (INPATIENT)
Facility: HOSPITAL | Age: 74
LOS: 11 days | Discharge: NURSING, REHAB OR LONG-TERM CARE | End: 2024-07-16
Attending: EMERGENCY MEDICINE | Admitting: INTERNAL MEDICINE
Payer: MEDICARE

## 2024-07-05 DIAGNOSIS — R55 SYNCOPE, UNSPECIFIED SYNCOPE TYPE: Primary | ICD-10-CM
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
## 2024-07-05 DIAGNOSIS — R94.31 ABNORMAL EKG: ICD-10-CM

## 2024-07-05 DIAGNOSIS — R00.1 BRADYCARDIA: ICD-10-CM

## 2024-07-05 DIAGNOSIS — I47.21 TORSADES DE POINTES: ICD-10-CM

## 2024-07-05 PROBLEM — I21.11 ACUTE ST ELEVATION MYOCARDIAL INFARCTION (STEMI) INVOLVING RIGHT CORONARY ARTERY: Status: ACTIVE | Noted: 2024-07-05

## 2024-07-05 LAB
ACT BLD: 207 SECONDS (ref 82–152)
ACT BLD: 269 SECONDS (ref 82–152)
ACT BLD: 299 SECONDS (ref 82–152)
ACT BLD: 311 SECONDS (ref 82–152)
ALBUMIN SERPL-MCNC: 3.4 G/DL (ref 3.5–5.2)
ALBUMIN/GLOB SERPL: 1.7 G/DL
ALP SERPL-CCNC: 38 U/L (ref 39–117)
ALT SERPL W P-5'-P-CCNC: 21 U/L (ref 1–33)
ANION GAP SERPL CALCULATED.3IONS-SCNC: 13 MMOL/L (ref 5–15)
AST SERPL-CCNC: 24 U/L (ref 1–32)
BASOPHILS # BLD AUTO: 0.03 10*3/MM3 (ref 0–0.2)
BASOPHILS NFR BLD AUTO: 0.5 % (ref 0–1.5)
BILIRUB SERPL-MCNC: 0.2 MG/DL (ref 0–1.2)
BUN SERPL-MCNC: 14 MG/DL (ref 8–23)
BUN/CREAT SERPL: 24.1 (ref 7–25)
CALCIUM SPEC-SCNC: 8.7 MG/DL (ref 8.6–10.5)
CHLORIDE SERPL-SCNC: 104 MMOL/L (ref 98–107)
CO2 SERPL-SCNC: 22 MMOL/L (ref 22–29)
CREAT SERPL-MCNC: 0.58 MG/DL (ref 0.57–1)
D DIMER PPP FEU-MCNC: 1.1 MCGFEU/ML (ref 0–0.73)
DEPRECATED RDW RBC AUTO: 43.5 FL (ref 37–54)
EGFRCR SERPLBLD CKD-EPI 2021: 95.7 ML/MIN/1.73
EOSINOPHIL # BLD AUTO: 0.06 10*3/MM3 (ref 0–0.4)
EOSINOPHIL NFR BLD AUTO: 0.9 % (ref 0.3–6.2)
ERYTHROCYTE [DISTWIDTH] IN BLOOD BY AUTOMATED COUNT: 12.9 % (ref 12.3–15.4)
ETHANOL BLD-MCNC: <10 MG/DL (ref 0–10)
ETHANOL UR QL: <0.01 %
GLOBULIN UR ELPH-MCNC: 2 GM/DL
GLUCOSE BLDC GLUCOMTR-MCNC: 91 MG/DL (ref 70–130)
GLUCOSE SERPL-MCNC: 123 MG/DL (ref 65–99)
HCT VFR BLD AUTO: 35.6 % (ref 34–46.6)
HGB BLD-MCNC: 11.7 G/DL (ref 12–15.9)
IMM GRANULOCYTES # BLD AUTO: 0.02 10*3/MM3 (ref 0–0.05)
IMM GRANULOCYTES NFR BLD AUTO: 0.3 % (ref 0–0.5)
INR PPP: 0.97 (ref 0.9–1.1)
LYMPHOCYTES # BLD AUTO: 2.5 10*3/MM3 (ref 0.7–3.1)
LYMPHOCYTES NFR BLD AUTO: 38.3 % (ref 19.6–45.3)
MAGNESIUM SERPL-MCNC: 1.8 MG/DL (ref 1.6–2.4)
MCH RBC QN AUTO: 30.5 PG (ref 26.6–33)
MCHC RBC AUTO-ENTMCNC: 32.9 G/DL (ref 31.5–35.7)
MCV RBC AUTO: 93 FL (ref 79–97)
MONOCYTES # BLD AUTO: 0.69 10*3/MM3 (ref 0.1–0.9)
MONOCYTES NFR BLD AUTO: 10.6 % (ref 5–12)
NEUTROPHILS NFR BLD AUTO: 3.23 10*3/MM3 (ref 1.7–7)
NEUTROPHILS NFR BLD AUTO: 49.4 % (ref 42.7–76)
NRBC BLD AUTO-RTO: 0 /100 WBC (ref 0–0.2)
PLATELET # BLD AUTO: 245 10*3/MM3 (ref 140–450)
PMV BLD AUTO: 9.4 FL (ref 6–12)
POTASSIUM SERPL-SCNC: 3.5 MMOL/L (ref 3.5–5.2)
PROT SERPL-MCNC: 5.4 G/DL (ref 6–8.5)
PROTHROMBIN TIME: 13.1 SECONDS (ref 11.7–14.2)
RBC # BLD AUTO: 3.83 10*6/MM3 (ref 3.77–5.28)
SODIUM SERPL-SCNC: 139 MMOL/L (ref 136–145)
TROPONIN T SERPL HS-MCNC: 11 NG/L
TSH SERPL DL<=0.05 MIU/L-ACNC: 3.9 UIU/ML (ref 0.27–4.2)
WBC NRBC COR # BLD AUTO: 6.53 10*3/MM3 (ref 3.4–10.8)

## 2024-07-05 PROCEDURE — C1725 CATH, TRANSLUMIN NON-LASER: HCPCS | Performed by: INTERNAL MEDICINE

## 2024-07-05 PROCEDURE — 85610 PROTHROMBIN TIME: CPT | Performed by: EMERGENCY MEDICINE

## 2024-07-05 PROCEDURE — C1887 CATHETER, GUIDING: HCPCS | Performed by: INTERNAL MEDICINE

## 2024-07-05 PROCEDURE — C9600 PERC DRUG-EL COR STENT SING: HCPCS | Performed by: INTERNAL MEDICINE

## 2024-07-05 PROCEDURE — 84443 ASSAY THYROID STIM HORMONE: CPT | Performed by: EMERGENCY MEDICINE

## 2024-07-05 PROCEDURE — 99291 CRITICAL CARE FIRST HOUR: CPT

## 2024-07-05 PROCEDURE — 80053 COMPREHEN METABOLIC PANEL: CPT | Performed by: EMERGENCY MEDICINE

## 2024-07-05 PROCEDURE — 83735 ASSAY OF MAGNESIUM: CPT | Performed by: EMERGENCY MEDICINE

## 2024-07-05 PROCEDURE — B2111ZZ FLUOROSCOPY OF MULTIPLE CORONARY ARTERIES USING LOW OSMOLAR CONTRAST: ICD-10-PCS | Performed by: INTERNAL MEDICINE

## 2024-07-05 PROCEDURE — 84484 ASSAY OF TROPONIN QUANT: CPT | Performed by: EMERGENCY MEDICINE

## 2024-07-05 PROCEDURE — 92979 ENDOLUMINL IVUS OCT C EA: CPT | Performed by: INTERNAL MEDICINE

## 2024-07-05 PROCEDURE — C1769 GUIDE WIRE: HCPCS | Performed by: INTERNAL MEDICINE

## 2024-07-05 PROCEDURE — 85379 FIBRIN DEGRADATION QUANT: CPT | Performed by: EMERGENCY MEDICINE

## 2024-07-05 PROCEDURE — B2151ZZ FLUOROSCOPY OF LEFT HEART USING LOW OSMOLAR CONTRAST: ICD-10-PCS | Performed by: INTERNAL MEDICINE

## 2024-07-05 PROCEDURE — 92928 PRQ TCAT PLMT NTRAC ST 1 LES: CPT | Performed by: INTERNAL MEDICINE

## 2024-07-05 PROCEDURE — C1894 INTRO/SHEATH, NON-LASER: HCPCS | Performed by: INTERNAL MEDICINE

## 2024-07-05 PROCEDURE — 93010 ELECTROCARDIOGRAM REPORT: CPT | Performed by: INTERNAL MEDICINE

## 2024-07-05 PROCEDURE — 25010000002 MIDAZOLAM PER 1 MG: Performed by: INTERNAL MEDICINE

## 2024-07-05 PROCEDURE — C1753 CATH, INTRAVAS ULTRASOUND: HCPCS | Performed by: INTERNAL MEDICINE

## 2024-07-05 PROCEDURE — 92941 PRQ TRLML REVSC TOT OCCL AMI: CPT | Performed by: INTERNAL MEDICINE

## 2024-07-05 PROCEDURE — C9606 PERC D-E COR REVASC W AMI S: HCPCS | Performed by: INTERNAL MEDICINE

## 2024-07-05 PROCEDURE — 92978 ENDOLUMINL IVUS OCT C 1ST: CPT | Performed by: INTERNAL MEDICINE

## 2024-07-05 PROCEDURE — 4A023N7 MEASUREMENT OF CARDIAC SAMPLING AND PRESSURE, LEFT HEART, PERCUTANEOUS APPROACH: ICD-10-PCS | Performed by: INTERNAL MEDICINE

## 2024-07-05 PROCEDURE — 82077 ASSAY SPEC XCP UR&BREATH IA: CPT | Performed by: EMERGENCY MEDICINE

## 2024-07-05 PROCEDURE — 93005 ELECTROCARDIOGRAM TRACING: CPT | Performed by: EMERGENCY MEDICINE

## 2024-07-05 PROCEDURE — 25010000002 ATROPINE PER 0.01 MG: Performed by: INTERNAL MEDICINE

## 2024-07-05 PROCEDURE — 93005 ELECTROCARDIOGRAM TRACING: CPT | Performed by: INTERNAL MEDICINE

## 2024-07-05 PROCEDURE — 25510000001 IOPAMIDOL PER 1 ML: Performed by: INTERNAL MEDICINE

## 2024-07-05 PROCEDURE — 5A2204Z RESTORATION OF CARDIAC RHYTHM, SINGLE: ICD-10-PCS | Performed by: INTERNAL MEDICINE

## 2024-07-05 PROCEDURE — 85347 COAGULATION TIME ACTIVATED: CPT

## 2024-07-05 PROCEDURE — 93458 L HRT ARTERY/VENTRICLE ANGIO: CPT | Performed by: INTERNAL MEDICINE

## 2024-07-05 PROCEDURE — B241ZZ3 ULTRASONOGRAPHY OF MULTIPLE CORONARY ARTERIES, INTRAVASCULAR: ICD-10-PCS | Performed by: INTERNAL MEDICINE

## 2024-07-05 PROCEDURE — 85025 COMPLETE CBC W/AUTO DIFF WBC: CPT | Performed by: EMERGENCY MEDICINE

## 2024-07-05 PROCEDURE — 25010000002 PHENYLEPHRINE 10 MG/ML SOLUTION: Performed by: INTERNAL MEDICINE

## 2024-07-05 PROCEDURE — 99223 1ST HOSP IP/OBS HIGH 75: CPT | Performed by: INTERNAL MEDICINE

## 2024-07-05 PROCEDURE — 25010000002 HEPARIN (PORCINE) PER 1000 UNITS: Performed by: INTERNAL MEDICINE

## 2024-07-05 PROCEDURE — C1874 STENT, COATED/COV W/DEL SYS: HCPCS | Performed by: INTERNAL MEDICINE

## 2024-07-05 PROCEDURE — 25010000002 FENTANYL CITRATE (PF) 50 MCG/ML SOLUTION: Performed by: INTERNAL MEDICINE

## 2024-07-05 PROCEDURE — 82948 REAGENT STRIP/BLOOD GLUCOSE: CPT

## 2024-07-05 PROCEDURE — 027135Z DILATION OF CORONARY ARTERY, TWO ARTERIES WITH TWO DRUG-ELUTING INTRALUMINAL DEVICES, PERCUTANEOUS APPROACH: ICD-10-PCS | Performed by: INTERNAL MEDICINE

## 2024-07-05 PROCEDURE — 71045 X-RAY EXAM CHEST 1 VIEW: CPT

## 2024-07-05 DEVICE — XIENCE SKYPOINT™ EVEROLIMUS ELUTING CORONARY STENT SYSTEM 2.50 MM X 18 MM / RAPID-EXCHANGE
Type: IMPLANTABLE DEVICE | Site: CORONARY | Status: FUNCTIONAL
Brand: XIENCE SKYPOINT™

## 2024-07-05 DEVICE — XIENCE SKYPOINT™ EVEROLIMUS ELUTING CORONARY STENT SYSTEM 3.50 MM X 38 MM / RAPID-EXCHANGE
Type: IMPLANTABLE DEVICE | Site: CORONARY | Status: FUNCTIONAL
Brand: XIENCE SKYPOINT™

## 2024-07-05 RX ORDER — ATROPINE SULFATE 1 MG/ML
INJECTION, SOLUTION INTRAMUSCULAR; INTRAVENOUS; SUBCUTANEOUS
Status: DISCONTINUED | OUTPATIENT
Start: 2024-07-05 | End: 2024-07-05 | Stop reason: HOSPADM

## 2024-07-05 RX ORDER — NALOXONE HCL 0.4 MG/ML
0.4 VIAL (ML) INJECTION
Status: DISCONTINUED | OUTPATIENT
Start: 2024-07-05 | End: 2024-07-06

## 2024-07-05 RX ORDER — PHENYLEPHRINE HYDROCHLORIDE 10 MG/ML
INJECTION INTRAVENOUS
Status: DISCONTINUED | OUTPATIENT
Start: 2024-07-05 | End: 2024-07-05 | Stop reason: HOSPADM

## 2024-07-05 RX ORDER — ATORVASTATIN CALCIUM 80 MG/1
80 TABLET, FILM COATED ORAL NIGHTLY
Status: DISCONTINUED | OUTPATIENT
Start: 2024-07-05 | End: 2024-07-16 | Stop reason: HOSPADM

## 2024-07-05 RX ORDER — VERAPAMIL HYDROCHLORIDE 2.5 MG/ML
INJECTION, SOLUTION INTRAVENOUS
Status: DISCONTINUED | OUTPATIENT
Start: 2024-07-05 | End: 2024-07-05 | Stop reason: HOSPADM

## 2024-07-05 RX ORDER — MORPHINE SULFATE 2 MG/ML
1 INJECTION, SOLUTION INTRAMUSCULAR; INTRAVENOUS EVERY 4 HOURS PRN
Status: DISCONTINUED | OUTPATIENT
Start: 2024-07-05 | End: 2024-07-06

## 2024-07-05 RX ORDER — ASPIRIN 325 MG
TABLET ORAL
Status: DISCONTINUED | OUTPATIENT
Start: 2024-07-05 | End: 2024-07-05 | Stop reason: HOSPADM

## 2024-07-05 RX ORDER — MIDAZOLAM HYDROCHLORIDE 1 MG/ML
INJECTION INTRAMUSCULAR; INTRAVENOUS
Status: DISCONTINUED | OUTPATIENT
Start: 2024-07-05 | End: 2024-07-05 | Stop reason: HOSPADM

## 2024-07-05 RX ORDER — ACETAMINOPHEN 325 MG/1
650 TABLET ORAL EVERY 4 HOURS PRN
Status: DISCONTINUED | OUTPATIENT
Start: 2024-07-05 | End: 2024-07-07

## 2024-07-05 RX ORDER — ONDANSETRON 2 MG/ML
4 INJECTION INTRAMUSCULAR; INTRAVENOUS EVERY 6 HOURS PRN
Status: DISCONTINUED | OUTPATIENT
Start: 2024-07-05 | End: 2024-07-16 | Stop reason: HOSPADM

## 2024-07-05 RX ORDER — NITROGLYCERIN 0.4 MG/1
0.4 TABLET SUBLINGUAL
Status: DISCONTINUED | OUTPATIENT
Start: 2024-07-05 | End: 2024-07-16 | Stop reason: HOSPADM

## 2024-07-05 RX ORDER — FENTANYL CITRATE 50 UG/ML
INJECTION, SOLUTION INTRAMUSCULAR; INTRAVENOUS
Status: DISCONTINUED | OUTPATIENT
Start: 2024-07-05 | End: 2024-07-05 | Stop reason: HOSPADM

## 2024-07-05 RX ORDER — HEPARIN SODIUM 1000 [USP'U]/ML
INJECTION, SOLUTION INTRAVENOUS; SUBCUTANEOUS
Status: DISCONTINUED | OUTPATIENT
Start: 2024-07-05 | End: 2024-07-05 | Stop reason: HOSPADM

## 2024-07-05 RX ORDER — ONDANSETRON 4 MG/1
4 TABLET, ORALLY DISINTEGRATING ORAL EVERY 6 HOURS PRN
Status: DISCONTINUED | OUTPATIENT
Start: 2024-07-05 | End: 2024-07-16 | Stop reason: HOSPADM

## 2024-07-05 RX ORDER — LIDOCAINE HYDROCHLORIDE 20 MG/ML
INJECTION, SOLUTION INFILTRATION; PERINEURAL
Status: DISCONTINUED | OUTPATIENT
Start: 2024-07-05 | End: 2024-07-05 | Stop reason: HOSPADM

## 2024-07-05 RX ADMIN — ATORVASTATIN CALCIUM 80 MG: 80 TABLET, FILM COATED ORAL at 22:48
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
## 2024-07-05 NOTE — Clinical Note
S:  Pt needs bolus for epidural    O: VSS - afebrile  FHTs:  140s Cat 1  [place]: Q 4  Cx: 5-6/90/-1  Pit 13    A/P  IUP at 40 2/7 weeks, IOL for post dates - doing well  1.  Labor:  Making change. Continue Pit and position change  2.  FWB:  Reassuring      removed.

## 2024-07-05 NOTE — Clinical Note
First balloon inflation max pressure = 18 lorraine. Second inflation of balloon - Max pressure = 18 lorraine. 2nd inflation was done at 21:23 EDT.

## 2024-07-05 NOTE — Clinical Note
First balloon inflation max pressure = 12 lorraine. Second inflation of balloon - Max pressure = 12 lorraine. 2nd inflation was done at 21:11 EDT. Third inflation of balloon - Max pressure = 12 lorraine. 3rd inflation was done at 21:11 EDT.

## 2024-07-05 NOTE — Clinical Note
Hemostasis started on the right radial artery. R-Band was used in achieving hemostasis. Radial compression device applied to vessel. Hemostasis achieved successfully. Closure device additional comment: 14cc tr band

## 2024-07-05 NOTE — Clinical Note
A 6 fr sheath was successfully inserted using micropuncture technique with ultrasound guidance into the right femoral artery. Sheath insertion not delayed.

## 2024-07-05 NOTE — Clinical Note
First balloon inflation max pressure = 14 lorraine. Second inflation of balloon - Max pressure = 12 lorraine. 2nd inflation was done at 20:59 EDT.

## 2024-07-05 NOTE — Clinical Note
Supply Documentation (free text) Detail Level: Simple Plan: Courtesy cryotherapy performed on 2 SKs on the center upper back. Plan: Pt reports these lesions are ingrown hairs and will continue to monitor for resolution. If not resolved in 6-8 weeks, RTC. Plan: Patient to RTC in 6-8 weeks for further evaluation and management if not resolved. If lesions have not resolved fully, we will consider biopsy to R/O NMSC. Plan: Recommended pt consult with Dr. Sherman for removal if he is interested. Information provided to pt for Dr. Sherman and photo documentation taken. Plan: Pt reports present and unchanged many years. Pt wishes to  monitor lesion and RTC immediately if changes are noted.

## 2024-07-05 NOTE — Clinical Note
First balloon inflation max pressure = 8 lorraine. Second inflation of balloon - Max pressure = 8 lorraine. 2nd inflation was done at 20:25 EDT.

## 2024-07-05 NOTE — Clinical Note
First balloon inflation max pressure = 14 lorraine. Second inflation of balloon - Max pressure = 14 lorraine. 2nd inflation was done at 20:31 EDT.

## 2024-07-05 NOTE — Clinical Note
Hemostasis started on the right radial artery. R-Band was used in achieving hemostasis. Radial compression device applied to vessel. Hemostasis achieved successfully. Closure device additional comment: 12 cc of air in tr band

## 2024-07-06 ENCOUNTER — APPOINTMENT (OUTPATIENT)
Dept: CARDIOLOGY | Facility: HOSPITAL | Age: 74
End: 2024-07-06
Payer: MEDICARE

## 2024-07-06 LAB
ANION GAP SERPL CALCULATED.3IONS-SCNC: 10 MMOL/L (ref 5–15)
ANION GAP SERPL CALCULATED.3IONS-SCNC: 10 MMOL/L (ref 5–15)
ANION GAP SERPL CALCULATED.3IONS-SCNC: 14 MMOL/L (ref 5–15)
AORTIC DIMENSIONLESS INDEX: 0.8 (DI)
ASCENDING AORTA: 3.6 CM
BH CV ECHO MEAS - ACS: 1.93 CM
BH CV ECHO MEAS - AI P1/2T: 500 MSEC
BH CV ECHO MEAS - AO MAX PG: 3.8 MMHG
BH CV ECHO MEAS - AO MEAN PG: 2.4 MMHG
BH CV ECHO MEAS - AO ROOT DIAM: 3 CM
BH CV ECHO MEAS - AO V2 MAX: 96.9 CM/SEC
BH CV ECHO MEAS - AO V2 VTI: 20.1 CM
BH CV ECHO MEAS - AVA(I,D): 2.48 CM2
BH CV ECHO MEAS - EDV(CUBED): 179.2 ML
BH CV ECHO MEAS - EDV(MOD-SP2): 141 ML
BH CV ECHO MEAS - EDV(MOD-SP4): 137 ML
BH CV ECHO MEAS - EF(MOD-BP): 36 %
BH CV ECHO MEAS - EF(MOD-SP2): 39 %
BH CV ECHO MEAS - EF(MOD-SP4): 32.8 %
BH CV ECHO MEAS - ESV(MOD-SP2): 86 ML
BH CV ECHO MEAS - ESV(MOD-SP4): 92 ML
BH CV ECHO MEAS - FS: 10.3 %
BH CV ECHO MEAS - IVS/LVPW: 1.11 CM
BH CV ECHO MEAS - IVSD: 0.92 CM
BH CV ECHO MEAS - LAT PEAK E' VEL: 6.6 CM/SEC
BH CV ECHO MEAS - LV DIASTOLIC VOL/BSA (35-75): 84 CM2
BH CV ECHO MEAS - LV MASS(C)D: 188 GRAMS
BH CV ECHO MEAS - LV MAX PG: 2.12 MMHG
BH CV ECHO MEAS - LV MEAN PG: 1.19 MMHG
BH CV ECHO MEAS - LV SYSTOLIC VOL/BSA (12-30): 56.4 CM2
BH CV ECHO MEAS - LV V1 MAX: 72.8 CM/SEC
BH CV ECHO MEAS - LV V1 VTI: 15.4 CM
BH CV ECHO MEAS - LVIDD: 5.6 CM
BH CV ECHO MEAS - LVIDS: 5.1 CM
BH CV ECHO MEAS - LVOT AREA: 3.2 CM2
BH CV ECHO MEAS - LVOT DIAM: 2.03 CM
BH CV ECHO MEAS - LVPWD: 0.83 CM
BH CV ECHO MEAS - MED PEAK E' VEL: 4.7 CM/SEC
BH CV ECHO MEAS - MR MAX PG: 98.1 MMHG
BH CV ECHO MEAS - MR MAX VEL: 495.2 CM/SEC
BH CV ECHO MEAS - MV A DUR: 0.15 SEC
BH CV ECHO MEAS - MV A MAX VEL: 65.1 CM/SEC
BH CV ECHO MEAS - MV DEC SLOPE: 268 CM/SEC2
BH CV ECHO MEAS - MV DEC TIME: 0.24 SEC
BH CV ECHO MEAS - MV E MAX VEL: 53.1 CM/SEC
BH CV ECHO MEAS - MV E/A: 0.82
BH CV ECHO MEAS - MV MAX PG: 1.99 MMHG
BH CV ECHO MEAS - MV MEAN PG: 1.14 MMHG
BH CV ECHO MEAS - MV P1/2T: 74.8 MSEC
BH CV ECHO MEAS - MV V2 VTI: 26 CM
BH CV ECHO MEAS - MVA(P1/2T): 2.9 CM2
BH CV ECHO MEAS - MVA(VTI): 1.91 CM2
BH CV ECHO MEAS - PA V2 MAX: 68.5 CM/SEC
BH CV ECHO MEAS - PULM A REVS DUR: 0.14 SEC
BH CV ECHO MEAS - PULM A REVS VEL: 31.3 CM/SEC
BH CV ECHO MEAS - PULM DIAS VEL: 30 CM/SEC
BH CV ECHO MEAS - PULM S/D: 1.19
BH CV ECHO MEAS - PULM SYS VEL: 35.6 CM/SEC
BH CV ECHO MEAS - RAP SYSTOLE: 8 MMHG
BH CV ECHO MEAS - RV MAX PG: 1.24 MMHG
BH CV ECHO MEAS - RV V1 MAX: 55.7 CM/SEC
BH CV ECHO MEAS - RV V1 VTI: 10.1 CM
BH CV ECHO MEAS - RVSP: 32 MMHG
BH CV ECHO MEAS - SV(LVOT): 49.8 ML
BH CV ECHO MEAS - SV(MOD-SP2): 55 ML
BH CV ECHO MEAS - SV(MOD-SP4): 45 ML
BH CV ECHO MEAS - SVI(LVOT): 30.5 ML/M2
BH CV ECHO MEAS - SVI(MOD-SP2): 33.7 ML/M2
BH CV ECHO MEAS - SVI(MOD-SP4): 27.6 ML/M2
BH CV ECHO MEAS - TAPSE (>1.6): 1.9 CM
BH CV ECHO MEAS - TR MAX PG: 24.1 MMHG
BH CV ECHO MEAS - TR MAX VEL: 245.6 CM/SEC
BH CV ECHO MEASUREMENTS AVERAGE E/E' RATIO: 9.4
BH CV XLRA - RV BASE: 3.5 CM
BH CV XLRA - RV LENGTH: 6.2 CM
BH CV XLRA - RV MID: 2.4 CM
BH CV XLRA - TDI S': 9.9 CM/SEC
BUN SERPL-MCNC: 13 MG/DL (ref 8–23)
BUN SERPL-MCNC: 13 MG/DL (ref 8–23)
BUN SERPL-MCNC: 21 MG/DL (ref 8–23)
BUN/CREAT SERPL: 23.2 (ref 7–25)
BUN/CREAT SERPL: 25 (ref 7–25)
BUN/CREAT SERPL: 35 (ref 7–25)
CALCIUM SPEC-SCNC: 8.2 MG/DL (ref 8.6–10.5)
CALCIUM SPEC-SCNC: 8.4 MG/DL (ref 8.6–10.5)
CALCIUM SPEC-SCNC: 8.8 MG/DL (ref 8.6–10.5)
CHLORIDE SERPL-SCNC: 100 MMOL/L (ref 98–107)
CHLORIDE SERPL-SCNC: 101 MMOL/L (ref 98–107)
CHLORIDE SERPL-SCNC: 102 MMOL/L (ref 98–107)
CHOLEST SERPL-MCNC: 157 MG/DL (ref 0–200)
CO2 SERPL-SCNC: 20 MMOL/L (ref 22–29)
CO2 SERPL-SCNC: 24 MMOL/L (ref 22–29)
CO2 SERPL-SCNC: 25 MMOL/L (ref 22–29)
CREAT SERPL-MCNC: 0.52 MG/DL (ref 0.57–1)
CREAT SERPL-MCNC: 0.56 MG/DL (ref 0.57–1)
CREAT SERPL-MCNC: 0.6 MG/DL (ref 0.57–1)
DEPRECATED RDW RBC AUTO: 44.7 FL (ref 37–54)
EGFRCR SERPLBLD CKD-EPI 2021: 94.9 ML/MIN/1.73
EGFRCR SERPLBLD CKD-EPI 2021: 96.5 ML/MIN/1.73
EGFRCR SERPLBLD CKD-EPI 2021: 98.2 ML/MIN/1.73
ERYTHROCYTE [DISTWIDTH] IN BLOOD BY AUTOMATED COUNT: 13 % (ref 12.3–15.4)
GEN 5 2HR TROPONIN T REFLEX: 843 NG/L
GLUCOSE BLDC GLUCOMTR-MCNC: 133 MG/DL (ref 70–130)
GLUCOSE SERPL-MCNC: 102 MG/DL (ref 65–99)
GLUCOSE SERPL-MCNC: 123 MG/DL (ref 65–99)
GLUCOSE SERPL-MCNC: 139 MG/DL (ref 65–99)
HBA1C MFR BLD: 5.2 % (ref 4.8–5.6)
HCT VFR BLD AUTO: 35.5 % (ref 34–46.6)
HDLC SERPL-MCNC: 54 MG/DL (ref 40–60)
HGB BLD-MCNC: 11.8 G/DL (ref 12–15.9)
LDLC SERPL CALC-MCNC: 88 MG/DL (ref 0–100)
LDLC/HDLC SERPL: 1.61 {RATIO}
LEFT ATRIUM VOLUME INDEX: 37.3 ML/M2
MAGNESIUM SERPL-MCNC: 2 MG/DL (ref 1.6–2.4)
MAGNESIUM SERPL-MCNC: 2.3 MG/DL (ref 1.6–2.4)
MCH RBC QN AUTO: 31 PG (ref 26.6–33)
MCHC RBC AUTO-ENTMCNC: 33.2 G/DL (ref 31.5–35.7)
MCV RBC AUTO: 93.2 FL (ref 79–97)
PHOSPHATE SERPL-MCNC: 3.2 MG/DL (ref 2.5–4.5)
PLATELET # BLD AUTO: 200 10*3/MM3 (ref 140–450)
PMV BLD AUTO: 9.5 FL (ref 6–12)
POTASSIUM SERPL-SCNC: 3.5 MMOL/L (ref 3.5–5.2)
POTASSIUM SERPL-SCNC: 3.7 MMOL/L (ref 3.5–5.2)
POTASSIUM SERPL-SCNC: 3.7 MMOL/L (ref 3.5–5.2)
QT INTERVAL: 444 MS
QT INTERVAL: 479 MS
QT INTERVAL: 502 MS
QT INTERVAL: 599 MS
QTC INTERVAL: 444 MS
QTC INTERVAL: 517 MS
QTC INTERVAL: 535 MS
QTC INTERVAL: 553 MS
RBC # BLD AUTO: 3.81 10*6/MM3 (ref 3.77–5.28)
SINUS: 3.2 CM
SODIUM SERPL-SCNC: 134 MMOL/L (ref 136–145)
SODIUM SERPL-SCNC: 136 MMOL/L (ref 136–145)
SODIUM SERPL-SCNC: 136 MMOL/L (ref 136–145)
STJ: 2.9 CM
TRIGL SERPL-MCNC: 80 MG/DL (ref 0–150)
TROPONIN T DELTA: 832 NG/L
VLDLC SERPL-MCNC: 15 MG/DL (ref 5–40)
WBC NRBC COR # BLD AUTO: 8.52 10*3/MM3 (ref 3.4–10.8)

## 2024-07-06 PROCEDURE — 80048 BASIC METABOLIC PNL TOTAL CA: CPT | Performed by: INTERNAL MEDICINE

## 2024-07-06 PROCEDURE — 93010 ELECTROCARDIOGRAM REPORT: CPT | Performed by: INTERNAL MEDICINE

## 2024-07-06 PROCEDURE — 84484 ASSAY OF TROPONIN QUANT: CPT | Performed by: EMERGENCY MEDICINE

## 2024-07-06 PROCEDURE — 80061 LIPID PANEL: CPT | Performed by: INTERNAL MEDICINE

## 2024-07-06 PROCEDURE — 93306 TTE W/DOPPLER COMPLETE: CPT

## 2024-07-06 PROCEDURE — 99232 SBSQ HOSP IP/OBS MODERATE 35: CPT | Performed by: INTERNAL MEDICINE

## 2024-07-06 PROCEDURE — 25510000001 PERFLUTREN (DEFINITY) 8.476 MG IN SODIUM CHLORIDE (PF) 0.9 % 10 ML INJECTION: Performed by: INTERNAL MEDICINE

## 2024-07-06 PROCEDURE — 93306 TTE W/DOPPLER COMPLETE: CPT | Performed by: INTERNAL MEDICINE

## 2024-07-06 PROCEDURE — 25010000002 AMIODARONE IN DEXTROSE 5% 150-4.21 MG/100ML-% SOLUTION: Performed by: INTERNAL MEDICINE

## 2024-07-06 PROCEDURE — 85027 COMPLETE CBC AUTOMATED: CPT | Performed by: INTERNAL MEDICINE

## 2024-07-06 PROCEDURE — 82948 REAGENT STRIP/BLOOD GLUCOSE: CPT

## 2024-07-06 PROCEDURE — 83735 ASSAY OF MAGNESIUM: CPT | Performed by: INTERNAL MEDICINE

## 2024-07-06 PROCEDURE — 93005 ELECTROCARDIOGRAM TRACING: CPT | Performed by: INTERNAL MEDICINE

## 2024-07-06 PROCEDURE — 83036 HEMOGLOBIN GLYCOSYLATED A1C: CPT | Performed by: INTERNAL MEDICINE

## 2024-07-06 PROCEDURE — 25010000002 AMIODARONE IN DEXTROSE 5% 360-4.14 MG/200ML-% SOLUTION: Performed by: INTERNAL MEDICINE

## 2024-07-06 PROCEDURE — 84100 ASSAY OF PHOSPHORUS: CPT | Performed by: INTERNAL MEDICINE

## 2024-07-06 PROCEDURE — 25010000002 MORPHINE PER 10 MG: Performed by: INTERNAL MEDICINE

## 2024-07-06 RX ORDER — CALCIUM CARBONATE 500 MG/1
2 TABLET, CHEWABLE ORAL 3 TIMES DAILY PRN
Status: DISCONTINUED | OUTPATIENT
Start: 2024-07-06 | End: 2024-07-16 | Stop reason: HOSPADM

## 2024-07-06 RX ORDER — NALOXONE HCL 0.4 MG/ML
0.4 VIAL (ML) INJECTION
Status: DISCONTINUED | OUTPATIENT
Start: 2024-07-06 | End: 2024-07-16 | Stop reason: HOSPADM

## 2024-07-06 RX ORDER — METOPROLOL TARTRATE 50 MG/1
50 TABLET, FILM COATED ORAL 2 TIMES DAILY
Status: DISCONTINUED | OUTPATIENT
Start: 2024-07-06 | End: 2024-07-06

## 2024-07-06 RX ORDER — MORPHINE SULFATE 2 MG/ML
2 INJECTION, SOLUTION INTRAMUSCULAR; INTRAVENOUS
Status: DISCONTINUED | OUTPATIENT
Start: 2024-07-06 | End: 2024-07-16 | Stop reason: HOSPADM

## 2024-07-06 RX ADMIN — TICAGRELOR 90 MG: 90 TABLET ORAL at 11:54

## 2024-07-06 RX ADMIN — TICAGRELOR 90 MG: 90 TABLET ORAL at 20:57

## 2024-07-06 RX ADMIN — AMIODARONE HYDROCHLORIDE 1 MG/MIN: 1.8 INJECTION, SOLUTION INTRAVENOUS at 21:28

## 2024-07-06 RX ADMIN — PERFLUTREN 1 ML: 6.52 INJECTION, SUSPENSION INTRAVENOUS at 11:46

## 2024-07-06 RX ADMIN — AMIODARONE HYDROCHLORIDE 150 MG: 1.5 INJECTION, SOLUTION INTRAVENOUS at 20:57

## 2024-07-06 RX ADMIN — MORPHINE SULFATE 1 MG: 2 INJECTION, SOLUTION INTRAMUSCULAR; INTRAVENOUS at 18:18

## 2024-07-06 RX ADMIN — NITROGLYCERIN 0.4 MG: 0.4 TABLET SUBLINGUAL at 05:00

## 2024-07-06 RX ADMIN — ATORVASTATIN CALCIUM 80 MG: 80 TABLET, FILM COATED ORAL at 20:57

## 2024-07-06 RX ADMIN — METOPROLOL TARTRATE 25 MG: 25 TABLET, FILM COATED ORAL at 23:34

## 2024-07-06 RX ADMIN — TICAGRELOR 90 MG: 90 TABLET ORAL at 00:27

## 2024-07-06 RX ADMIN — MORPHINE SULFATE 1 MG: 2 INJECTION, SOLUTION INTRAMUSCULAR; INTRAVENOUS at 03:22

## 2024-07-06 RX ADMIN — Medication 2 TABLET: at 16:58

## 2024-07-06 RX ADMIN — METOPROLOL TARTRATE 25 MG: 25 TABLET, FILM COATED ORAL at 14:34

## 2024-07-06 RX ADMIN — METOPROLOL TARTRATE 25 MG: 25 TABLET, FILM COATED ORAL at 21:45
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
## 2024-07-06 NOTE — PROGRESS NOTES
"    Patient Name: Jailene Molina  :1950  73 y.o.      Patient Care Team:  Allan Daugherty MD as PCP - General (Internal Medicine)  Samantha Chau MD as Consulting Physician (Obstetrics and Gynecology)  Gemma Zabala MD as Consulting Physician (Obstetrics and Gynecology)    Chief Complaint:   STEMI  Interval History:    No further angina. No complaints     Objective   Vital Signs  Temp:  [94.6 °F (34.8 °C)-97.8 °F (36.6 °C)] 97.7 °F (36.5 °C)  Heart Rate:  [32-94] 70  Resp:  [10-24] 15  BP: ()/() 108/70    Intake/Output Summary (Last 24 hours) at 2024 1238  Last data filed at 2024 0800  Gross per 24 hour   Intake 790 ml   Output 1170 ml   Net -380 ml     Flowsheet Rows      Flowsheet Row First Filed Value   Admission Height 167.6 cm (66\") Documented at 2024   Admission Weight 57.6 kg (127 lb) Documented at 2024            Physical Exam:   General Appearance:    Alert, cooperative, in no acute distress   Lungs:     Clear to auscultation.  Normal respiratory effort and rate.      Heart:    Regular rhythm and normal rate, normal S1 and S2, no murmurs, gallops or rubs.     Chest Wall:    No abnormalities observed   Abdomen:     Soft, nontender, positive bowel sounds.     Extremities:   no cyanosis, clubbing or edema.  No marked joint deformities.  Adequate musculoskeletal strength.       Results Review:    Results from last 7 days   Lab Units 24  0505   SODIUM mmol/L 136   POTASSIUM mmol/L 3.5   CHLORIDE mmol/L 101   CO2 mmol/L 25.0   BUN mg/dL 13   CREATININE mg/dL 0.52*   GLUCOSE mg/dL 139*   CALCIUM mg/dL 8.2*     Results from last 7 days   Lab Units 24  0007 24   HSTROP T ng/L 843* 11     Results from last 7 days   Lab Units 24  0505   WBC 10*3/mm3 8.52   HEMOGLOBIN g/dL 11.8*   HEMATOCRIT % 35.5   PLATELETS 10*3/mm3 200     Results from last 7 days   Lab Units 24   INR  0.97     Results from last 7 days   Lab Units " 07/06/24  0007   MAGNESIUM mg/dL 2.0     Results from last 7 days   Lab Units 07/06/24  0505   CHOLESTEROL mg/dL 157   TRIGLYCERIDES mg/dL 80   HDL CHOL mg/dL 54   LDL CHOL mg/dL 88               Medication Review:   atorvastatin, 80 mg, Oral, Nightly  ticagrelor, 90 mg, Oral, Q12H              Assessment & Plan   Acute inferior STEMI s/p PCI distal RCA  Severe calcified ostial LAD l esion 99% s/p PCI  Sinus bradycardia/ 2:1 HB resolved after reperfusion  VF requiring 1 defibrillation during RCA PCI  HLD  ASA, Ticagrelor, LDL goal <50  Plan echo today  Maintain in ICU     Maya Lebron MD  [facility]  07/06/24  12:38 EDT

## 2024-07-06 NOTE — PLAN OF CARE
Goal Outcome Evaluation:      Patient was transfer from cath lab. One stent to RCA. Was afib initially post cath and converted to sinus rhythm. Patient complain of chest and throat pain morphine and nitro SL given per cardiology and stat EKG was obtain. Vitals stable will continued to monitor.

## 2024-07-07 ENCOUNTER — ANESTHESIA EVENT (OUTPATIENT)
Dept: INTERVENTIONAL RADIOLOGY/VASCULAR | Facility: HOSPITAL | Age: 74
End: 2024-07-07
Payer: MEDICARE

## 2024-07-07 ENCOUNTER — APPOINTMENT (OUTPATIENT)
Dept: CT IMAGING | Facility: HOSPITAL | Age: 74
End: 2024-07-07
Payer: MEDICARE

## 2024-07-07 ENCOUNTER — APPOINTMENT (OUTPATIENT)
Dept: GENERAL RADIOLOGY | Facility: HOSPITAL | Age: 74
End: 2024-07-07
Payer: MEDICARE

## 2024-07-07 PROBLEM — I63.511 ACUTE ISCHEMIC RIGHT MCA STROKE: Status: ACTIVE | Noted: 2024-07-07

## 2024-07-07 PROBLEM — I47.21 TORSADES DE POINTES: Status: ACTIVE | Noted: 2024-07-05

## 2024-07-07 LAB
ANION GAP SERPL CALCULATED.3IONS-SCNC: 10 MMOL/L (ref 5–15)
BUN SERPL-MCNC: 22 MG/DL (ref 8–23)
BUN/CREAT SERPL: 39.3 (ref 7–25)
CALCIUM SPEC-SCNC: 8.4 MG/DL (ref 8.6–10.5)
CHLORIDE SERPL-SCNC: 99 MMOL/L (ref 98–107)
CO2 SERPL-SCNC: 23 MMOL/L (ref 22–29)
CREAT SERPL-MCNC: 0.56 MG/DL (ref 0.57–1)
EGFRCR SERPLBLD CKD-EPI 2021: 96.5 ML/MIN/1.73
GLUCOSE BLDC GLUCOMTR-MCNC: 113 MG/DL (ref 70–130)
GLUCOSE SERPL-MCNC: 126 MG/DL (ref 65–99)
INR PPP: 1.18 (ref 0.9–1.1)
MAGNESIUM SERPL-MCNC: 2.3 MG/DL (ref 1.6–2.4)
POTASSIUM SERPL-SCNC: 3.6 MMOL/L (ref 3.5–5.2)
PROTHROMBIN TIME: 15.2 SECONDS (ref 11.7–14.2)
QT INTERVAL: 296 MS
QT INTERVAL: 528 MS
QTC INTERVAL: 474 MS
QTC INTERVAL: 501 MS
SODIUM SERPL-SCNC: 132 MMOL/L (ref 136–145)

## 2024-07-07 PROCEDURE — 25010000002 HEPARIN (PORCINE) PER 1000 UNITS: Performed by: INTERNAL MEDICINE

## 2024-07-07 PROCEDURE — 25010000002 PHENYLEPHRINE 10 MG/ML SOLUTION: Performed by: ANESTHESIOLOGY

## 2024-07-07 PROCEDURE — 61645 PERQ ART M-THROMBECT &/NFS: CPT | Performed by: RADIOLOGY

## 2024-07-07 PROCEDURE — B2111ZZ FLUOROSCOPY OF MULTIPLE CORONARY ARTERIES USING LOW OSMOLAR CONTRAST: ICD-10-PCS | Performed by: INTERNAL MEDICINE

## 2024-07-07 PROCEDURE — 25010000002 PHENYLEPHRINE 10 MG/ML SOLUTION 5 ML VIAL: Performed by: ANESTHESIOLOGY

## 2024-07-07 PROCEDURE — C1894 INTRO/SHEATH, NON-LASER: HCPCS | Performed by: INTERNAL MEDICINE

## 2024-07-07 PROCEDURE — 25510000001 IOPAMIDOL PER 1 ML: Performed by: INTERNAL MEDICINE

## 2024-07-07 PROCEDURE — 25810000003 SODIUM CHLORIDE 0.9 % SOLUTION 250 ML FLEX CONT: Performed by: ANESTHESIOLOGY

## 2024-07-07 PROCEDURE — 70498 CT ANGIOGRAPHY NECK: CPT

## 2024-07-07 PROCEDURE — C1769 GUIDE WIRE: HCPCS

## 2024-07-07 PROCEDURE — C1757 CATH, THROMBECTOMY/EMBOLECT: HCPCS

## 2024-07-07 PROCEDURE — 5A1223Z PERFORMANCE OF CARDIAC PACING, CONTINUOUS: ICD-10-PCS | Performed by: INTERNAL MEDICINE

## 2024-07-07 PROCEDURE — 85610 PROTHROMBIN TIME: CPT | Performed by: STUDENT IN AN ORGANIZED HEALTH CARE EDUCATION/TRAINING PROGRAM

## 2024-07-07 PROCEDURE — 25810000003 LACTATED RINGERS PER 1000 ML: Performed by: ANESTHESIOLOGY

## 2024-07-07 PROCEDURE — 93010 ELECTROCARDIOGRAM REPORT: CPT | Performed by: INTERNAL MEDICINE

## 2024-07-07 PROCEDURE — C1769 GUIDE WIRE: HCPCS | Performed by: INTERNAL MEDICINE

## 2024-07-07 PROCEDURE — 25010000002 MIDAZOLAM PER 1 MG: Performed by: INTERNAL MEDICINE

## 2024-07-07 PROCEDURE — 25010000002 FENTANYL CITRATE (PF) 50 MCG/ML SOLUTION: Performed by: INTERNAL MEDICINE

## 2024-07-07 PROCEDURE — 83735 ASSAY OF MAGNESIUM: CPT | Performed by: INTERNAL MEDICINE

## 2024-07-07 PROCEDURE — 25010000002 DEXAMETHASONE PER 1 MG: Performed by: ANESTHESIOLOGY

## 2024-07-07 PROCEDURE — 0 IODIXANOL PER 1 ML: Performed by: INTERNAL MEDICINE

## 2024-07-07 PROCEDURE — 61645 PERQ ART M-THROMBECT &/NFS: CPT

## 2024-07-07 PROCEDURE — C1894 INTRO/SHEATH, NON-LASER: HCPCS

## 2024-07-07 PROCEDURE — 0042T HC CT CEREBRAL PERFUSION W/WO CONTRAST: CPT

## 2024-07-07 PROCEDURE — C1760 CLOSURE DEV, VASC: HCPCS

## 2024-07-07 PROCEDURE — 25010000002 AMIODARONE IN DEXTROSE 5% 360-4.14 MG/200ML-% SOLUTION: Performed by: INTERNAL MEDICINE

## 2024-07-07 PROCEDURE — 25010000002 ONDANSETRON PER 1 MG: Performed by: INTERNAL MEDICINE

## 2024-07-07 PROCEDURE — C1887 CATHETER, GUIDING: HCPCS | Performed by: INTERNAL MEDICINE

## 2024-07-07 PROCEDURE — 25010000002 PROPOFOL 10 MG/ML EMULSION: Performed by: ANESTHESIOLOGY

## 2024-07-07 PROCEDURE — 71045 X-RAY EXAM CHEST 1 VIEW: CPT

## 2024-07-07 PROCEDURE — 25010000002 FUROSEMIDE PER 20 MG: Performed by: INTERNAL MEDICINE

## 2024-07-07 PROCEDURE — 93454 CORONARY ARTERY ANGIO S&I: CPT | Performed by: INTERNAL MEDICINE

## 2024-07-07 PROCEDURE — 25010000002 PROCAINAMIDE PER 1 G: Performed by: INTERNAL MEDICINE

## 2024-07-07 PROCEDURE — 25010000002 HEPARIN (PORCINE) PER 1000 UNITS: Performed by: RADIOLOGY

## 2024-07-07 PROCEDURE — 93005 ELECTROCARDIOGRAM TRACING: CPT | Performed by: INTERNAL MEDICINE

## 2024-07-07 PROCEDURE — 80048 BASIC METABOLIC PNL TOTAL CA: CPT | Performed by: INTERNAL MEDICINE

## 2024-07-07 PROCEDURE — 99232 SBSQ HOSP IP/OBS MODERATE 35: CPT | Performed by: INTERNAL MEDICINE

## 2024-07-07 PROCEDURE — 0 DEXTROSE 5 % SOLUTION 500 ML FLEX CONT: Performed by: INTERNAL MEDICINE

## 2024-07-07 PROCEDURE — 82948 REAGENT STRIP/BLOOD GLUCOSE: CPT

## 2024-07-07 PROCEDURE — 70496 CT ANGIOGRAPHY HEAD: CPT

## 2024-07-07 PROCEDURE — C1887 CATHETER, GUIDING: HCPCS

## 2024-07-07 RX ORDER — SODIUM CHLORIDE, SODIUM LACTATE, POTASSIUM CHLORIDE, CALCIUM CHLORIDE 600; 310; 30; 20 MG/100ML; MG/100ML; MG/100ML; MG/100ML
INJECTION, SOLUTION INTRAVENOUS CONTINUOUS PRN
Status: DISCONTINUED | OUTPATIENT
Start: 2024-07-07 | End: 2024-07-07 | Stop reason: SURG

## 2024-07-07 RX ORDER — SODIUM CHLORIDE 9 MG/ML
250 INJECTION, SOLUTION INTRAVENOUS ONCE AS NEEDED
Status: DISCONTINUED | OUTPATIENT
Start: 2024-07-07 | End: 2024-07-16 | Stop reason: HOSPADM

## 2024-07-07 RX ORDER — LIDOCAINE HYDROCHLORIDE 20 MG/ML
INJECTION, SOLUTION INFILTRATION; PERINEURAL
Status: DISCONTINUED | OUTPATIENT
Start: 2024-07-07 | End: 2024-07-07 | Stop reason: HOSPADM

## 2024-07-07 RX ORDER — FUROSEMIDE 10 MG/ML
40 INJECTION INTRAMUSCULAR; INTRAVENOUS ONCE
Status: COMPLETED | OUTPATIENT
Start: 2024-07-07 | End: 2024-07-07

## 2024-07-07 RX ORDER — ACETAMINOPHEN 325 MG/1
650 TABLET ORAL EVERY 4 HOURS PRN
Status: DISCONTINUED | OUTPATIENT
Start: 2024-07-07 | End: 2024-07-16 | Stop reason: HOSPADM

## 2024-07-07 RX ORDER — SODIUM CHLORIDE 9 MG/ML
INJECTION, SOLUTION INTRAVENOUS
Status: COMPLETED | OUTPATIENT
Start: 2024-07-07 | End: 2024-07-07

## 2024-07-07 RX ORDER — FENTANYL CITRATE 50 UG/ML
INJECTION, SOLUTION INTRAMUSCULAR; INTRAVENOUS
Status: DISCONTINUED | OUTPATIENT
Start: 2024-07-07 | End: 2024-07-07 | Stop reason: HOSPADM

## 2024-07-07 RX ORDER — IODIXANOL 320 MG/ML
200 INJECTION, SOLUTION INTRAVASCULAR
Status: COMPLETED | OUTPATIENT
Start: 2024-07-07 | End: 2024-07-07

## 2024-07-07 RX ORDER — ASPIRIN 81 MG/1
TABLET, CHEWABLE ORAL
Status: DISCONTINUED | OUTPATIENT
Start: 2024-07-07 | End: 2024-07-07 | Stop reason: HOSPADM

## 2024-07-07 RX ORDER — ASPIRIN 81 MG/1
81 TABLET ORAL DAILY
Status: DISCONTINUED | OUTPATIENT
Start: 2024-07-08 | End: 2024-07-16 | Stop reason: HOSPADM

## 2024-07-07 RX ORDER — MIDAZOLAM HYDROCHLORIDE 1 MG/ML
2 INJECTION INTRAMUSCULAR; INTRAVENOUS ONCE
Status: DISCONTINUED | OUTPATIENT
Start: 2024-07-07 | End: 2024-07-07

## 2024-07-07 RX ORDER — DEXAMETHASONE SODIUM PHOSPHATE 4 MG/ML
INJECTION, SOLUTION INTRA-ARTICULAR; INTRALESIONAL; INTRAMUSCULAR; INTRAVENOUS; SOFT TISSUE AS NEEDED
Status: DISCONTINUED | OUTPATIENT
Start: 2024-07-07 | End: 2024-07-07 | Stop reason: SURG

## 2024-07-07 RX ORDER — PROPOFOL 10 MG/ML
VIAL (ML) INTRAVENOUS AS NEEDED
Status: DISCONTINUED | OUTPATIENT
Start: 2024-07-07 | End: 2024-07-07 | Stop reason: SURG

## 2024-07-07 RX ORDER — PHENYLEPHRINE HYDROCHLORIDE 10 MG/ML
INJECTION INTRAVENOUS AS NEEDED
Status: DISCONTINUED | OUTPATIENT
Start: 2024-07-07 | End: 2024-07-07 | Stop reason: SURG

## 2024-07-07 RX ORDER — LIDOCAINE HYDROCHLORIDE 20 MG/ML
INJECTION, SOLUTION INFILTRATION; PERINEURAL AS NEEDED
Status: DISCONTINUED | OUTPATIENT
Start: 2024-07-07 | End: 2024-07-07 | Stop reason: SURG

## 2024-07-07 RX ORDER — VERAPAMIL HYDROCHLORIDE 2.5 MG/ML
INJECTION, SOLUTION INTRAVENOUS
Status: DISCONTINUED | OUTPATIENT
Start: 2024-07-07 | End: 2024-07-07 | Stop reason: HOSPADM

## 2024-07-07 RX ORDER — HEPARIN SODIUM 1000 [USP'U]/ML
INJECTION, SOLUTION INTRAVENOUS; SUBCUTANEOUS
Status: DISCONTINUED | OUTPATIENT
Start: 2024-07-07 | End: 2024-07-07 | Stop reason: HOSPADM

## 2024-07-07 RX ORDER — MIDAZOLAM HYDROCHLORIDE 1 MG/ML
INJECTION INTRAMUSCULAR; INTRAVENOUS
Status: DISCONTINUED | OUTPATIENT
Start: 2024-07-07 | End: 2024-07-07 | Stop reason: HOSPADM

## 2024-07-07 RX ORDER — MIDAZOLAM HYDROCHLORIDE 1 MG/ML
2 INJECTION INTRAMUSCULAR; INTRAVENOUS EVERY 4 HOURS PRN
Status: DISCONTINUED | OUTPATIENT
Start: 2024-07-07 | End: 2024-07-16 | Stop reason: HOSPADM

## 2024-07-07 RX ORDER — ROCURONIUM BROMIDE 10 MG/ML
INJECTION, SOLUTION INTRAVENOUS AS NEEDED
Status: DISCONTINUED | OUTPATIENT
Start: 2024-07-07 | End: 2024-07-07 | Stop reason: SURG

## 2024-07-07 RX ORDER — FENTANYL CITRATE 50 UG/ML
100 INJECTION, SOLUTION INTRAMUSCULAR; INTRAVENOUS ONCE
Status: DISCONTINUED | OUTPATIENT
Start: 2024-07-07 | End: 2024-07-07

## 2024-07-07 RX ORDER — AMIODARONE HYDROCHLORIDE 200 MG/1
200 TABLET ORAL EVERY 12 HOURS SCHEDULED
Status: DISCONTINUED | OUTPATIENT
Start: 2024-07-07 | End: 2024-07-08

## 2024-07-07 RX ORDER — FENTANYL CITRATE 50 UG/ML
50 INJECTION, SOLUTION INTRAMUSCULAR; INTRAVENOUS
Status: DISCONTINUED | OUTPATIENT
Start: 2024-07-07 | End: 2024-07-10

## 2024-07-07 RX ADMIN — LIDOCAINE HYDROCHLORIDE 50 MG: 20 INJECTION, SOLUTION INFILTRATION; PERINEURAL at 19:07

## 2024-07-07 RX ADMIN — TICAGRELOR 90 MG: 90 TABLET ORAL at 08:45

## 2024-07-07 RX ADMIN — DEXAMETHASONE SODIUM PHOSPHATE 6 MG: 4 INJECTION, SOLUTION INTRA-ARTICULAR; INTRALESIONAL; INTRAMUSCULAR; INTRAVENOUS; SOFT TISSUE at 19:59

## 2024-07-07 RX ADMIN — AMIODARONE HYDROCHLORIDE 200 MG: 200 TABLET ORAL at 08:44

## 2024-07-07 RX ADMIN — FUROSEMIDE 40 MG: 10 INJECTION, SOLUTION INTRAMUSCULAR; INTRAVENOUS at 11:13

## 2024-07-07 RX ADMIN — HEPARIN SODIUM: 1000 INJECTION INTRAVENOUS; SUBCUTANEOUS at 19:08

## 2024-07-07 RX ADMIN — PROCAINAMIDE HYDROCHLORIDE 1 MG/MIN: 100 INJECTION, SOLUTION INTRAMUSCULAR; INTRAVENOUS at 03:10

## 2024-07-07 RX ADMIN — AMIODARONE HYDROCHLORIDE 0.5 MG/MIN: 1.8 INJECTION, SOLUTION INTRAVENOUS at 09:30

## 2024-07-07 RX ADMIN — HEPARIN SODIUM: 1000 INJECTION INTRAVENOUS; SUBCUTANEOUS at 19:04

## 2024-07-07 RX ADMIN — Medication 2 TABLET: at 12:43

## 2024-07-07 RX ADMIN — HEPARIN SODIUM: 1000 INJECTION INTRAVENOUS; SUBCUTANEOUS at 19:05

## 2024-07-07 RX ADMIN — HEPARIN SODIUM: 1000 INJECTION INTRAVENOUS; SUBCUTANEOUS at 19:07

## 2024-07-07 RX ADMIN — IODIXANOL 95 ML: 320 INJECTION, SOLUTION INTRAVASCULAR at 21:15

## 2024-07-07 RX ADMIN — PHENYLEPHRINE HYDROCHLORIDE 200 MCG: 10 INJECTION INTRAVENOUS at 19:19

## 2024-07-07 RX ADMIN — PHENYLEPHRINE HYDROCHLORIDE 100 MCG: 10 INJECTION INTRAVENOUS at 19:31

## 2024-07-07 RX ADMIN — SODIUM CHLORIDE, POTASSIUM CHLORIDE, SODIUM LACTATE AND CALCIUM CHLORIDE: 600; 310; 30; 20 INJECTION, SOLUTION INTRAVENOUS at 19:38

## 2024-07-07 RX ADMIN — PHENYLEPHRINE HYDROCHLORIDE 0.5 MCG/KG/MIN: 10 INJECTION, SOLUTION INTRAVENOUS at 19:16

## 2024-07-07 RX ADMIN — AMIODARONE HYDROCHLORIDE 0.5 MG/MIN: 1.8 INJECTION, SOLUTION INTRAVENOUS at 03:14

## 2024-07-07 RX ADMIN — PROCAINAMIDE HYDROCHLORIDE 1000 MG: 100 INJECTION, SOLUTION INTRAMUSCULAR; INTRAVENOUS at 00:47

## 2024-07-07 RX ADMIN — IOPAMIDOL 150 ML: 755 INJECTION, SOLUTION INTRAVENOUS at 17:49

## 2024-07-07 RX ADMIN — HEPARIN SODIUM: 1000 INJECTION INTRAVENOUS; SUBCUTANEOUS at 19:06

## 2024-07-07 RX ADMIN — ONDANSETRON 4 MG: 2 INJECTION INTRAMUSCULAR; INTRAVENOUS at 19:50

## 2024-07-07 RX ADMIN — ROCURONIUM BROMIDE 50 MG: 10 INJECTION, SOLUTION INTRAVENOUS at 19:07

## 2024-07-07 RX ADMIN — PHENYLEPHRINE HYDROCHLORIDE 100 MCG: 10 INJECTION INTRAVENOUS at 19:26

## 2024-07-07 RX ADMIN — PHENYLEPHRINE HYDROCHLORIDE 200 MCG: 10 INJECTION INTRAVENOUS at 20:04

## 2024-07-07 RX ADMIN — PROPOFOL 100 MG: 10 INJECTION, EMULSION INTRAVENOUS at 19:07

## 2024-07-07 NOTE — NURSING NOTE
Pre-procedure NIH:9  Decision time:1802  In room:1857  Procedure start:1903  Arterial access puncture time:1903  Guide catheter placement time:1915  First thrombectomy device placement time (first pass):1928  Subsequent device placement time:1943  Time of recanalization:1943  Final TICI Flow: 3  Procedure end time:2004    **These values were verbally verified with physician performing procedure.**

## 2024-07-07 NOTE — ANESTHESIA PREPROCEDURE EVALUATION
Anesthesia Evaluation     NPO Solid Status: Waived due to emergency  NPO Liquid Status: Waived due to emergency           Airway   Mallampati: II  TM distance: >3 FB  Neck ROM: full  Dental      Pulmonary - normal exam   Cardiovascular - normal exam    (+) past MI , CAD, cardiac stents within the past 12 months     ROS comment: Overdrive transvenous pacing for torsade de pointes, multiple ventricular arrhythmias    Neuro/Psych  (+) psychiatric history Anxiety  GI/Hepatic/Renal/Endo      Musculoskeletal     Abdominal    Substance History      OB/GYN          Other                    Anesthesia Plan    ASA 4 - emergent     general         CODE STATUS:    Code Status (Patient has no pulse and is not breathing): CPR (Attempt to Resuscitate)  Medical Interventions (Patient has pulse or is breathing): Full Support

## 2024-07-07 NOTE — PLAN OF CARE
Goal Outcome Evaluation:      Patient vss; went to cath lab this afternoon with Dr. Lebron for venous pacer placement and investigation of cardiac arteries and stents r/t continued ventricular rythyms since original cath lab visit. Upon return it was noticed that the patient was having s/s of stroke; team D initiated. Patient currently in IR for clot retrieval. F/c placed prior to sugery. Rate set at 110.

## 2024-07-07 NOTE — ANESTHESIA PROCEDURE NOTES
Airway  Urgency: elective    Date/Time: 7/7/2024 7:16 PM  Airway not difficult    General Information and Staff    Patient location during procedure: OR  Anesthesiologist: Kirsten Rivera MD    Indications and Patient Condition  Indications for airway management: airway protection    Preoxygenated: yes  Mask difficulty assessment: 1 - vent by mask    Final Airway Details  Final airway type: endotracheal airway      Successful airway: ETT  Cuffed: yes   Successful intubation technique: direct laryngoscopy  Endotracheal tube insertion site: oral  Blade: CMAC  Blade size: D  ETT size (mm): 7.5  Cormack-Lehane Classification: grade I - full view of glottis  Placement verified by: chest auscultation and capnometry   Measured from: lips  ETT/EBT  to lips (cm): 21  Number of attempts at approach: 1  Assessment: lips, teeth, and gum same as pre-op and atraumatic intubation

## 2024-07-07 NOTE — NURSING NOTE
2015-Patient began to runs of Vtach with the longest run of 19. [facility] Confirmed via EKG. C/o of chest pressure rating  8/10. BP and oxygen stable STAT labs ordered.     Cardio paged and orders for amio gtt protocol and okay to give Brilinta per Dr. Montiel. Dr. Lebron with cardio interventional was paged. Informed of patient's rhythm change Per Dr. Lebron-denys to proceed with amio bolus and maintenance and to give 50 po metoprolol.      2057-Amio bolus given. Patient's CP subsided. Rhythm converted to Sinus martell. Per Dr. Lebron, to give 25 po metoprolol instead of the 50. See MAR.     2303-Patient began to have runs of Vtach. Longest run 20 beats. Dr. Lebron was paged and orders to give procainamide bolus and maintenance gtt.  To be given in addition with the amiodarone gtt. Also give an additional 25 mg po metoprolol. Patient is asymptomatic. Informed Dr. Lebron in regards to the serial EKG protocol with the procainamide. Stated she only wanted one in the morning.     0551-Dr. Lebron paged for QT interval of 528 and HR 48-53. Continues to have small runs of Vtach. Per. denys Bridges to continue amio and procainamide gtt. Patient remains asymptomatic. SBP remains stable.     Care on going.

## 2024-07-07 NOTE — PROGRESS NOTES
"    Patient Name: Jailene Molina  :1950  73 y.o.      Patient Care Team:  Allan Daugherty MD as PCP - General (Internal Medicine)  Samantha Chau MD as Consulting Physician (Obstetrics and Gynecology)  Gemma Zabala MD as Consulting Physician (Obstetrics and Gynecology)    Chief Complaint:   STEMI  Interval History:   VT overnight, several runs up to 10-20 beats, polymorphic, wide, ugly appearing.asymptomatic, hemodynamically stable. Startedon IV amio without improvement, procaineamide improved those still some brief episodes. No further angina. CXR with some congestion and hyponatremic today    Objective   Vital Signs  Temp:  [97.7 °F (36.5 °C)-98.9 °F (37.2 °C)] 97.7 °F (36.5 °C)  Heart Rate:  [] 83  Resp:  [16-20] 18  BP: ()/(65-99) 104/74    Intake/Output Summary (Last 24 hours) at 2024 1124  Last data filed at 2024 0930  Gross per 24 hour   Intake 480 ml   Output 550 ml   Net -70 ml     Flowsheet Rows      Flowsheet Row First Filed Value   Admission Height 167.6 cm (66\") Documented at 2024   Admission Weight 57.6 kg (127 lb) Documented at 2024            Physical Exam:   General Appearance:    Alert, cooperative, in no acute distress   Lungs:     Clear to auscultation.  Normal respiratory effort and rate.      Heart:    Regular rhythm and normal rate, normal S1 and S2, no murmurs, gallops or rubs.     Chest Wall:    No abnormalities observed   Abdomen:     Soft, nontender, positive bowel sounds.     Extremities:   no cyanosis, clubbing or edema.  No marked joint deformities.  Adequate musculoskeletal strength.       Results Review:    Results from last 7 days   Lab Units 24  0607   SODIUM mmol/L 132*   POTASSIUM mmol/L 3.6   CHLORIDE mmol/L 99   CO2 mmol/L 23.0   BUN mg/dL 22   CREATININE mg/dL 0.56*   GLUCOSE mg/dL 126*   CALCIUM mg/dL 8.4*     Results from last 7 days   Lab Units 24  0007 24  1936   HSTROP T ng/L 843* 11     Results " from last 7 days   Lab Units 07/06/24  0505   WBC 10*3/mm3 8.52   HEMOGLOBIN g/dL 11.8*   HEMATOCRIT % 35.5   PLATELETS 10*3/mm3 200     Results from last 7 days   Lab Units 07/05/24  1936   INR  0.97     Results from last 7 days   Lab Units 07/07/24  0607   MAGNESIUM mg/dL 2.3     Results from last 7 days   Lab Units 07/06/24  0505   CHOLESTEROL mg/dL 157   TRIGLYCERIDES mg/dL 80   HDL CHOL mg/dL 54   LDL CHOL mg/dL 88               Medication Review:   amiodarone, 200 mg, Oral, Q12H  atorvastatin, 80 mg, Oral, Nightly  fentaNYL citrate (PF), 100 mcg, Intravenous, Once  midazolam, 2 mg, Intravenous, Once  ticagrelor, 90 mg, Oral, Q12H         amiodarone, 0.5 mg/min, Last Rate: 0.5 mg/min (07/07/24 0930)  procainamide (PRONESTYL) 2,000 mg in dextrose (D5W) 5 % 500 mL IVPB, 1-4 mg/min, Last Rate: 1 mg/min (07/07/24 0310)        Assessment & Plan   Acute inferior STEMI s/p PCI distal RCA  Severe calcified ostial LAD l esion 99% s/p PCI  Sinus bradycardia/ 2:1 HB resolved after reperfusion  VF requiring 1 defibrillation during RCA PCI  Frequent salvos VT, IV amio and po amio until loaded. IV procaiamide until tomorrow. Metoprolol.   Ischemic CM: ef 35%.     Troubling ventricular arrhythmias. IV amio, po amio, procainamide. Will have EP see tomorrow as well.   Continue in CICU  Lasix x 1 today looks euvolemic but hyponatremic and CXR congested.   GDMT once VT issues have calmed down.     Maya Lebron MD  [facility]  07/07/24  11:24 EDT

## 2024-07-07 NOTE — PROGRESS NOTES
Pt with prolonged torsades > 50 beats, presyncope  Rebolus procainamide and increase drip to 2mg/min  Given TdP will place transvenous pacer to overdrive pace. Will repeat coronary angiography to reassess vessels as well  Urgent, high risk deterioration. Discussed with pt and her brother.   Maya Lebron MD  [facility]  07/07/24

## 2024-07-07 NOTE — CODE DOCUMENTATION
Team D called for patient after she returned from cath lab. Noted to have left sided facial weakness, garbled speech, left sided arm weakness. Dr. Stack notified, team D imaging ordered. Dr. Rawls notified for cardiology. Carlos MARIE and David MARIE taking pt down for team D>/

## 2024-07-08 ENCOUNTER — APPOINTMENT (OUTPATIENT)
Dept: CT IMAGING | Facility: HOSPITAL | Age: 74
End: 2024-07-08
Payer: MEDICARE

## 2024-07-08 ENCOUNTER — APPOINTMENT (OUTPATIENT)
Dept: GENERAL RADIOLOGY | Facility: HOSPITAL | Age: 74
End: 2024-07-08
Payer: MEDICARE

## 2024-07-08 LAB
ANION GAP SERPL CALCULATED.3IONS-SCNC: 8 MMOL/L (ref 5–15)
BUN SERPL-MCNC: 17 MG/DL (ref 8–23)
BUN/CREAT SERPL: 30.4 (ref 7–25)
CALCIUM SPEC-SCNC: 8.1 MG/DL (ref 8.6–10.5)
CHLORIDE SERPL-SCNC: 99 MMOL/L (ref 98–107)
CHOLEST SERPL-MCNC: 135 MG/DL (ref 0–200)
CO2 SERPL-SCNC: 25 MMOL/L (ref 22–29)
CREAT SERPL-MCNC: 0.56 MG/DL (ref 0.57–1)
DEPRECATED RDW RBC AUTO: 46.3 FL (ref 37–54)
EGFRCR SERPLBLD CKD-EPI 2021: 96.5 ML/MIN/1.73
ERYTHROCYTE [DISTWIDTH] IN BLOOD BY AUTOMATED COUNT: 13.5 % (ref 12.3–15.4)
GLUCOSE BLDC GLUCOMTR-MCNC: 119 MG/DL (ref 70–130)
GLUCOSE BLDC GLUCOMTR-MCNC: 133 MG/DL (ref 70–130)
GLUCOSE BLDC GLUCOMTR-MCNC: 148 MG/DL (ref 70–130)
GLUCOSE SERPL-MCNC: 154 MG/DL (ref 65–99)
HCT VFR BLD AUTO: 38.3 % (ref 34–46.6)
HDLC SERPL-MCNC: 42 MG/DL (ref 40–60)
HGB BLD-MCNC: 12.9 G/DL (ref 12–15.9)
LDLC SERPL CALC-MCNC: 76 MG/DL (ref 0–100)
LDLC/HDLC SERPL: 1.8 {RATIO}
MAGNESIUM SERPL-MCNC: 2 MG/DL (ref 1.6–2.4)
MCH RBC QN AUTO: 31.2 PG (ref 26.6–33)
MCHC RBC AUTO-ENTMCNC: 33.7 G/DL (ref 31.5–35.7)
MCV RBC AUTO: 92.5 FL (ref 79–97)
PLATELET # BLD AUTO: 179 10*3/MM3 (ref 140–450)
PMV BLD AUTO: 9.7 FL (ref 6–12)
POTASSIUM SERPL-SCNC: 3.4 MMOL/L (ref 3.5–5.2)
POTASSIUM SERPL-SCNC: 3.9 MMOL/L (ref 3.5–5.2)
RBC # BLD AUTO: 4.14 10*6/MM3 (ref 3.77–5.28)
SODIUM SERPL-SCNC: 132 MMOL/L (ref 136–145)
TRIGL SERPL-MCNC: 86 MG/DL (ref 0–150)
VLDLC SERPL-MCNC: 17 MG/DL (ref 5–40)
WBC NRBC COR # BLD AUTO: 11.54 10*3/MM3 (ref 3.4–10.8)

## 2024-07-08 PROCEDURE — 25010000002 PROCAINAMIDE PER 1 G: Performed by: INTERNAL MEDICINE

## 2024-07-08 PROCEDURE — 83735 ASSAY OF MAGNESIUM: CPT | Performed by: INTERNAL MEDICINE

## 2024-07-08 PROCEDURE — B31R1ZZ FLUOROSCOPY OF INTRACRANIAL ARTERIES USING LOW OSMOLAR CONTRAST: ICD-10-PCS | Performed by: RADIOLOGY

## 2024-07-08 PROCEDURE — 80061 LIPID PANEL: CPT | Performed by: RADIOLOGY

## 2024-07-08 PROCEDURE — 70450 CT HEAD/BRAIN W/O DYE: CPT

## 2024-07-08 PROCEDURE — 25010000002 POTASSIUM CHLORIDE 10 MEQ/100ML SOLUTION: Performed by: INTERNAL MEDICINE

## 2024-07-08 PROCEDURE — 92610 EVALUATE SWALLOWING FUNCTION: CPT

## 2024-07-08 PROCEDURE — 93010 ELECTROCARDIOGRAM REPORT: CPT | Performed by: INTERNAL MEDICINE

## 2024-07-08 PROCEDURE — 82948 REAGENT STRIP/BLOOD GLUCOSE: CPT

## 2024-07-08 PROCEDURE — 85027 COMPLETE CBC AUTOMATED: CPT | Performed by: INTERNAL MEDICINE

## 2024-07-08 PROCEDURE — 99232 SBSQ HOSP IP/OBS MODERATE 35: CPT | Performed by: PHYSICIAN ASSISTANT

## 2024-07-08 PROCEDURE — 99232 SBSQ HOSP IP/OBS MODERATE 35: CPT | Performed by: INTERNAL MEDICINE

## 2024-07-08 PROCEDURE — 03CG3ZZ EXTIRPATION OF MATTER FROM INTRACRANIAL ARTERY, PERCUTANEOUS APPROACH: ICD-10-PCS | Performed by: RADIOLOGY

## 2024-07-08 PROCEDURE — 99222 1ST HOSP IP/OBS MODERATE 55: CPT | Performed by: PSYCHIATRY & NEUROLOGY

## 2024-07-08 PROCEDURE — 80048 BASIC METABOLIC PNL TOTAL CA: CPT | Performed by: INTERNAL MEDICINE

## 2024-07-08 PROCEDURE — 93005 ELECTROCARDIOGRAM TRACING: CPT | Performed by: PHYSICIAN ASSISTANT

## 2024-07-08 PROCEDURE — 74018 RADEX ABDOMEN 1 VIEW: CPT

## 2024-07-08 PROCEDURE — 84132 ASSAY OF SERUM POTASSIUM: CPT | Performed by: INTERNAL MEDICINE

## 2024-07-08 PROCEDURE — B3161ZZ FLUOROSCOPY OF RIGHT INTERNAL CAROTID ARTERY USING LOW OSMOLAR CONTRAST: ICD-10-PCS | Performed by: RADIOLOGY

## 2024-07-08 PROCEDURE — 0 DEXTROSE 5 % SOLUTION 500 ML FLEX CONT: Performed by: INTERNAL MEDICINE

## 2024-07-08 PROCEDURE — 25810000003 SODIUM CHLORIDE 0.9 % SOLUTION: Performed by: INTERNAL MEDICINE

## 2024-07-08 RX ORDER — SODIUM CHLORIDE 0.9 % (FLUSH) 0.9 %
10 SYRINGE (ML) INJECTION AS NEEDED
Status: DISCONTINUED | OUTPATIENT
Start: 2024-07-08 | End: 2024-07-16 | Stop reason: HOSPADM

## 2024-07-08 RX ORDER — SODIUM CHLORIDE 9 MG/ML
40 INJECTION, SOLUTION INTRAVENOUS AS NEEDED
Status: DISCONTINUED | OUTPATIENT
Start: 2024-07-08 | End: 2024-07-16 | Stop reason: HOSPADM

## 2024-07-08 RX ORDER — ASPIRIN 81 MG/1
81 TABLET ORAL ONCE
Status: COMPLETED | OUTPATIENT
Start: 2024-07-08 | End: 2024-07-08

## 2024-07-08 RX ORDER — SODIUM CHLORIDE 9 MG/ML
100 INJECTION, SOLUTION INTRAVENOUS CONTINUOUS
Status: DISCONTINUED | OUTPATIENT
Start: 2024-07-08 | End: 2024-07-10

## 2024-07-08 RX ORDER — POTASSIUM CHLORIDE 7.45 MG/ML
10 INJECTION INTRAVENOUS
Status: COMPLETED | OUTPATIENT
Start: 2024-07-08 | End: 2024-07-08

## 2024-07-08 RX ORDER — SODIUM CHLORIDE 0.9 % (FLUSH) 0.9 %
10 SYRINGE (ML) INJECTION EVERY 12 HOURS SCHEDULED
Status: DISCONTINUED | OUTPATIENT
Start: 2024-07-08 | End: 2024-07-16 | Stop reason: HOSPADM

## 2024-07-08 RX ORDER — MIDODRINE HYDROCHLORIDE 5 MG/1
5 TABLET ORAL
Status: DISCONTINUED | OUTPATIENT
Start: 2024-07-08 | End: 2024-07-09

## 2024-07-08 RX ADMIN — SODIUM CHLORIDE 100 ML/HR: 9 INJECTION, SOLUTION INTRAVENOUS at 21:17

## 2024-07-08 RX ADMIN — Medication 10 ML: at 08:40

## 2024-07-08 RX ADMIN — PROCAINAMIDE HYDROCHLORIDE 2 MG/MIN: 100 INJECTION, SOLUTION INTRAMUSCULAR; INTRAVENOUS at 02:23

## 2024-07-08 RX ADMIN — POTASSIUM CHLORIDE 10 MEQ: 7.46 INJECTION, SOLUTION INTRAVENOUS at 06:44

## 2024-07-08 RX ADMIN — Medication 10 ML: at 20:35

## 2024-07-08 RX ADMIN — SODIUM CHLORIDE 125 ML/HR: 9 INJECTION, SOLUTION INTRAVENOUS at 11:31

## 2024-07-08 RX ADMIN — TICAGRELOR 180 MG: 90 TABLET ORAL at 22:01

## 2024-07-08 RX ADMIN — ASPIRIN 81 MG: 81 TABLET, COATED ORAL at 22:02

## 2024-07-08 RX ADMIN — ATORVASTATIN CALCIUM 80 MG: 80 TABLET, FILM COATED ORAL at 22:02

## 2024-07-08 RX ADMIN — POTASSIUM CHLORIDE 10 MEQ: 7.46 INJECTION, SOLUTION INTRAVENOUS at 11:30

## 2024-07-08 RX ADMIN — POTASSIUM CHLORIDE 10 MEQ: 7.46 INJECTION, SOLUTION INTRAVENOUS at 09:54

## 2024-07-08 RX ADMIN — POTASSIUM CHLORIDE 10 MEQ: 7.46 INJECTION, SOLUTION INTRAVENOUS at 08:18

## 2024-07-08 NOTE — BRIEF OP NOTE
Progress Note    Jailene Molina      Pre-op Diagnosis:   Acute Stroke       Post-Op Diagnosis Codes:  Same    Procedure/CPT® Codes:    Cerebral Mechanical Thrombectomy    Anesthesia: General ETT Anesthesia    Staff:   Radiology Technologist: Rajwinder Ansari  Radiology Nurse: Kaylee Alston RN  Radiologist: Danilo Dumont MD         Estimated Blood Loss: 100ml    Urine Voided: 400 mL    Specimens:                None          Drains:   Urethral Catheter Straight-tip 14 Fr. (Active)       [REMOVED] External Urinary Catheter (Removed)   Daily Indications Daily output 07/06/24 1200   Site Assessment Clean;Skin intact;Pink 07/06/24 1200   Application/Removal skin care provided 07/06/24 1200   Collection Container Wall suction 07/06/24 1200   Wall suction (mmHG) 130 mmHG 07/06/24 1200   Catheter care complete Yes 07/06/24 0800   Output (mL) 200 mL 07/06/24 0800       Findings: Distal Right Superior M2 occlusion with 2 aspiration passes and TICI 3 flow. No stenosis or extravasation seen. Official report to follow.        Complications: None encountered.          Danilo Dumont MD     Date: 7/7/2024  Time: 20:27 EDT
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
## 2024-07-08 NOTE — PLAN OF CARE
Goal Outcome Evaluation:  Plan of Care Reviewed With: patient           Outcome Evaluation: Patient seen for clinical swallow assessment. Pt with HOB flat orders until PACER removed, unless taking meds or with meals. Pt seen at 30 degrees. L neglect noted, but was able to look midline. L facial droop and tongue deviation noted. Swallow delay appreciated with all PO. Question of wet voice with ice x1, puree x1, and nectar. Pt at high risk for aspiration d/t HOB positioning and oropharyngeal weakness. SLP recs instrumental to further assess. INR slightly elevated, but Dr. Lora approved FEES. Will reach out to cardiology regarding HOB elevation. Can allow ice chips sparingly after oral care.

## 2024-07-08 NOTE — SIGNIFICANT NOTE
07/08/24 0912   OTHER   Discipline occupational therapist   Rehab Time/Intention   Session Not Performed unable to evaluate, medical status change  (noted strict bedrest, HOB flat, transvenouse pacer.   Discuss with RN and agree to hold OT today.)   Recommendation   OT - Next Appointment 07/09/24

## 2024-07-08 NOTE — CASE MANAGEMENT/SOCIAL WORK
Discharge Planning Assessment  [facility]     Patient Name: Jailene Molina  MRN: [id number]  Today's Date: 7/8/2024    Admit Date: 7/5/2024    Plan: Plan is home with no needs   Discharge Needs Assessment       Row Name 07/08/24 1359       Living Environment    People in Home child(stefani), adult    Current Living Arrangements home    Potentially Unsafe Housing Conditions none    In the past 12 months has the electric, gas, oil, or water company threatened to shut off services in your home? No    Primary Care Provided by self    Provides Primary Care For no one    Family Caregiver if Needed friend(s)    Quality of Family Relationships supportive    Able to Return to Prior Arrangements no       Resource/Environmental Concerns    Resource/Environmental Concerns none       Transportation Needs    In the past 12 months, has lack of transportation kept you from medical appointments or from getting medications? no    In the past 12 months, has lack of transportation kept you from meetings, work, or from getting things needed for daily living? No       Food Insecurity    Within the past 12 months, you worried that your food would run out before you got the money to buy more. Never true    Within the past 12 months, the food you bought just didn't last and you didn't have money to get more. Never true       Transition Planning    Patient/Family Anticipates Transition to home with family    Patient/Family Anticipated Services at Transition none    Transportation Anticipated family or friend will provide       Discharge Needs Assessment    Equipment Currently Used at Home none    Anticipated Changes Related to Illness none    Equipment Needed After Discharge none                   Discharge Plan       Row Name 07/08/24 1400       Plan    Plan Plan is home with no needs    Plan Comments CCP spoke to patient at bedside to discuss discharge planning.  Face sheet verified  Pt pharmacy is [facility] in [place].  Pt lives  in a three story condo.  He kimberly is in the basement  She has no difficulty with the steps.  She has no history of HH.  She has been to rehab in the past at [facility].  She plans home with no needs at discharge  CCP following                  Continued Care and Services - Admitted Since 7/5/2024    No active coordination exists for this encounter.          Demographic Summary    No documentation.                  Functional Status    No documentation.                  Psychosocial    No documentation.                  Abuse/Neglect    No documentation.                  Legal    No documentation.                  Substance Abuse    No documentation.                  Patient Forms    No documentation.                     Blanca Santos RN

## 2024-07-08 NOTE — ANESTHESIA POSTPROCEDURE EVALUATION
Patient: Jailene Molina    Procedure Summary       Date: 07/07/24 Room / Location: [facility] INTERVENTIONAL    Anesthesia Start: 1857 Anesthesia Stop: 2045    Procedure: IR PERC MECH THROMB PRIM NONC ART INI Diagnosis: (Emergent stroke)    Scheduled Providers:  Provider: Kirsten Rivera MD    Anesthesia Type: general ASA Status: 4 - Emergent            Anesthesia Type: general    Vitals  Vitals Value Taken Time   /86 07/07/24 2042   Temp     Pulse 109 07/07/24 2044   Resp     SpO2 97 % 07/07/24 1857   Vitals shown include unfiled device data.        Post Anesthesia Care and Evaluation    Patient location during evaluation: bedside  Patient participation: complete - patient participated  Level of consciousness: awake  Pain management: adequate    Airway patency: patent  Anesthetic complications: No anesthetic complications    Cardiovascular status: acceptable  Respiratory status: acceptable  Hydration status: acceptable

## 2024-07-08 NOTE — PLAN OF CARE
Goal Outcome Evaluation:  Plan of Care Reviewed With: patient        Progress: no change  Outcome Evaluation: AM CT done, patient NIH now 15. MD aware, no new orders at this time. Patient still A&Ox4, L leg now unable to wiggle toes, L arm unable to overcome gravity. Both withdrawing. No complaints of pain at this time.

## 2024-07-08 NOTE — PROGRESS NOTES
"    Patient Name: Jailene Molina  :1950  73 y.o.      Patient Care Team:  Allan Daugherty MD as PCP - General (Internal Medicine)  Samantha Chau MD as Consulting Physician (Obstetrics and Gynecology)  Gemma Zabala MD as Consulting Physician (Obstetrics and Gynecology)    Chief Complaint:   STEMI  Interval History:   - no angina  - yesterday had 54 beats of torsades de pointes. As a result was taken for re-look of cath and temporary venous pacer placement for overdrive pacing. Immediately after catheterization she developed left sided weakness. Brain CT perfusion showed areas of ischemia. Was taken quickly to IR suite with aspiration thrombectomy. 2 hours later NIH had improved from 9 to 7 however now this morning has worsened left sided strength.  - continues to be paced at 115bpm without futher VT. BP has been soft overnight.     Objective   Vital Signs  Temp:  [96.8 °F (36 °C)-97.8 °F (36.6 °C)] 96.8 °F (36 °C)  Heart Rate:  [] 110  Resp:  [11-20] 17  BP: ()/() 120/75  Arterial Line BP: ()/() 111/70    Intake/Output Summary (Last 24 hours) at 2024 1000  Last data filed at 2024 0732  Gross per 24 hour   Intake 1625.3 ml   Output 2375 ml   Net -749.7 ml     Flowsheet Rows      Flowsheet Row First Filed Value   Admission Height 167.6 cm (66\") Documented at 2024   Admission Weight 57.6 kg (127 lb) Documented at 2024            Physical Exam:   General Appearance:    Alert, cooperative, in no acute distress   Lungs:     Clear to auscultation.  Normal respiratory effort and rate.      Heart:    Regular rhythm and normal rate, normal S1 and S2, no murmurs, gallops or rubs.     Chest Wall:    No abnormalities observed   Abdomen:     Soft, nontender, positive bowel sounds.     Extremities:   no cyanosis, clubbing or edema.  No marked joint deformities.  Adequate musculoskeletal strength.       Results Review:    Results from last 7 days   Lab " Units 07/08/24  0340   SODIUM mmol/L 132*   POTASSIUM mmol/L 3.4*   CHLORIDE mmol/L 99   CO2 mmol/L 25.0   BUN mg/dL 17   CREATININE mg/dL 0.56*   GLUCOSE mg/dL 154*   CALCIUM mg/dL 8.1*     Results from last 7 days   Lab Units 07/06/24  0007 07/05/24  1936   HSTROP T ng/L 843* 11     Results from last 7 days   Lab Units 07/08/24  0340   WBC 10*3/mm3 11.54*   HEMOGLOBIN g/dL 12.9   HEMATOCRIT % 38.3   PLATELETS 10*3/mm3 179     Results from last 7 days   Lab Units 07/07/24  1805 07/05/24  1936   INR  1.18* 0.97     Results from last 7 days   Lab Units 07/08/24  0340   MAGNESIUM mg/dL 2.0     Results from last 7 days   Lab Units 07/08/24  0340   CHOLESTEROL mg/dL 135   TRIGLYCERIDES mg/dL 86   HDL CHOL mg/dL 42   LDL CHOL mg/dL 76               Medication Review:   amiodarone, 200 mg, Oral, Q12H  aspirin, 81 mg, Oral, Daily  atorvastatin, 80 mg, Oral, Nightly  potassium chloride, 10 mEq, Intravenous, Q1H  sodium chloride, 10 mL, Intravenous, Q12H  [Held by provider] ticagrelor, 90 mg, Oral, Q12H         procainamide (PRONESTYL) 2,000 mg in dextrose (D5W) 5 % 500 mL IVPB, 1-4 mg/min, Last Rate: 2 mg/min (07/08/24 0223)        Assessment & Plan   Acute inferior STEMI s/p PCI distal RCA 7/6  Severe calcified ostial LAD l esion 99% s/p PCI 7/6  Sinus bradycardia/ 2:1 HB resolved after reperfusion 7/6  VF requiring 1 defibrillation during RCA PCI 7/6  Polymophic VT/torsades- on po amio and procainamide. TVP for overdrive pacing. Began 7/7  Ischemic CM: ef 35%.   7.   Cardioembolic CVA after diagnostic cath 7/7    - STEMI: continue DAPt  - Torsades: continue procainamide at 2mg/min and temporary pacing at 115 bpm.   - CVA: s/p aspiration thrombectomy. Worsened physical exam today. Repeat CT without overt bleed. Defer to neuro BP goal.   - add midodrine for better perfusion, BP goal per -150.     Maya Lebron MD  [facility]  07/08/24  10:00 EDT

## 2024-07-08 NOTE — NURSING NOTE
0430 paged stroke neurologist r/t patient NIH of 10. Pt no longer able to lift L leg off bed, or wiggle toes as compared to prior assessment. Pulses still able to be dopplered. AM CT done. Awaiting call back    0505: Spoke w/ Dr. Stack, no new orders at this time.

## 2024-07-08 NOTE — CONSULTS
Pt in bed. Brother present at bedside. Chp offered introductions to Pt and provided hospitality and supportive presence. No specific spiritual concerns identified. Follow up welcomed.      Chp remains available to Pt and family as needed.

## 2024-07-08 NOTE — PROGRESS NOTES
Patient evaluated at the bedside by myself and Dr. Lyles.    On 7/7/2024 she had had multiple episodes of polymorphic VT.  Thought to be positive and she would have a PVC with compensatory pause and then go into polymorphic VT.  She was started on IV amiodarone and IV procainamide.    Amiodarone was discontinued for a period of time and then resumed.  Ectopy was worse after she was resumed on amiodarone.    A temporary pacemaker was placed to increase her ventricular rate to prevent further episodes.    Overnight her rhythm has been stable.  She is currently on IV procainamide 2 mcg/min.    At bedside pacer rate was turned down to 50 and she had her own and needed rhythm.  Subsequent EKG was completed.  QT interval was 471.      Of note, she suffered a recent inferior MI with comorbid 99% LAD occlusion.  She received stenting to both vessels and this ectopy occurred within the reperfusion ectopy window.  At this point it is unclear if this polymorphic VT is due to recent ischemia versus it being related to use of amiodarone.    She also suffered an MI and is having ongoing left-sided weakness.      Will plan to keep her on IV procainamide overnight and start to wean in the a.m.  If she has recurrent episodes of VT turn pacemaker rate back up.  This was communicated with nursing bedside staff.

## 2024-07-08 NOTE — SIGNIFICANT NOTE
07/08/24 0824   OTHER   Discipline physical therapist   Rehab Time/Intention   Session Not Performed other (see comments)  (Pt with strict bedrest orders this AM. PT will await neuro follow up and check back tomorrow.)   Recommendation   PT - Next Appointment 07/09/24

## 2024-07-08 NOTE — PLAN OF CARE
Pt awake, Oriented x 4. Notable deviation, Left sided weakness. NIH 14. Procainamide gtt infusing. Potassium repletion initiated. MRI ordered; unable to complete at this time d/t TVP box not compatible. Brother et friends at bedside throughout the shift. ST allows pt to have ice chips only and probable bedside FEES 7/8/24.

## 2024-07-09 ENCOUNTER — TELEPHONE (OUTPATIENT)
Dept: NEUROSURGERY | Facility: CLINIC | Age: 74
End: 2024-07-09
Payer: MEDICARE

## 2024-07-09 ENCOUNTER — APPOINTMENT (OUTPATIENT)
Dept: MRI IMAGING | Facility: HOSPITAL | Age: 74
End: 2024-07-09
Payer: MEDICARE

## 2024-07-09 LAB
ANION GAP SERPL CALCULATED.3IONS-SCNC: 9 MMOL/L (ref 5–15)
BUN SERPL-MCNC: 19 MG/DL (ref 8–23)
BUN/CREAT SERPL: 38 (ref 7–25)
CALCIUM SPEC-SCNC: 7.4 MG/DL (ref 8.6–10.5)
CHLORIDE SERPL-SCNC: 103 MMOL/L (ref 98–107)
CO2 SERPL-SCNC: 21 MMOL/L (ref 22–29)
CREAT SERPL-MCNC: 0.5 MG/DL (ref 0.57–1)
DEPRECATED RDW RBC AUTO: 43.4 FL (ref 37–54)
EGFRCR SERPLBLD CKD-EPI 2021: 99.2 ML/MIN/1.73
ERYTHROCYTE [DISTWIDTH] IN BLOOD BY AUTOMATED COUNT: 13.1 % (ref 12.3–15.4)
GLUCOSE BLDC GLUCOMTR-MCNC: 104 MG/DL (ref 70–130)
GLUCOSE BLDC GLUCOMTR-MCNC: 118 MG/DL (ref 70–130)
GLUCOSE BLDC GLUCOMTR-MCNC: 122 MG/DL (ref 70–130)
GLUCOSE SERPL-MCNC: 119 MG/DL (ref 65–99)
HCT VFR BLD AUTO: 35.8 % (ref 34–46.6)
HGB BLD-MCNC: 12.3 G/DL (ref 12–15.9)
MCH RBC QN AUTO: 31.3 PG (ref 26.6–33)
MCHC RBC AUTO-ENTMCNC: 34.4 G/DL (ref 31.5–35.7)
MCV RBC AUTO: 91.1 FL (ref 79–97)
PLATELET # BLD AUTO: 165 10*3/MM3 (ref 140–450)
PMV BLD AUTO: 10.3 FL (ref 6–12)
POTASSIUM SERPL-SCNC: 3.6 MMOL/L (ref 3.5–5.2)
QT INTERVAL: 383 MS
QT INTERVAL: 446 MS
QTC INTERVAL: 434 MS
QTC INTERVAL: 471 MS
RBC # BLD AUTO: 3.93 10*6/MM3 (ref 3.77–5.28)
SODIUM SERPL-SCNC: 133 MMOL/L (ref 136–145)
WBC NRBC COR # BLD AUTO: 12.53 10*3/MM3 (ref 3.4–10.8)

## 2024-07-09 PROCEDURE — 0 DEXTROSE 5 % SOLUTION 500 ML FLEX CONT: Performed by: INTERNAL MEDICINE

## 2024-07-09 PROCEDURE — 85027 COMPLETE CBC AUTOMATED: CPT

## 2024-07-09 PROCEDURE — 99232 SBSQ HOSP IP/OBS MODERATE 35: CPT | Performed by: INTERNAL MEDICINE

## 2024-07-09 PROCEDURE — 25010000002 MORPHINE PER 10 MG: Performed by: INTERNAL MEDICINE

## 2024-07-09 PROCEDURE — 93005 ELECTROCARDIOGRAM TRACING: CPT | Performed by: PHYSICIAN ASSISTANT

## 2024-07-09 PROCEDURE — 25810000003 SODIUM CHLORIDE 0.9 % SOLUTION: Performed by: INTERNAL MEDICINE

## 2024-07-09 PROCEDURE — 99232 SBSQ HOSP IP/OBS MODERATE 35: CPT | Performed by: PHYSICIAN ASSISTANT

## 2024-07-09 PROCEDURE — 82948 REAGENT STRIP/BLOOD GLUCOSE: CPT

## 2024-07-09 PROCEDURE — 80048 BASIC METABOLIC PNL TOTAL CA: CPT

## 2024-07-09 PROCEDURE — 93010 ELECTROCARDIOGRAM REPORT: CPT | Performed by: INTERNAL MEDICINE

## 2024-07-09 PROCEDURE — 70551 MRI BRAIN STEM W/O DYE: CPT

## 2024-07-09 PROCEDURE — 25010000002 PROCAINAMIDE PER 1 G: Performed by: INTERNAL MEDICINE

## 2024-07-09 RX ORDER — LOSARTAN POTASSIUM 25 MG/1
12.5 TABLET ORAL
Status: DISCONTINUED | OUTPATIENT
Start: 2024-07-09 | End: 2024-07-10

## 2024-07-09 RX ORDER — MIDODRINE HYDROCHLORIDE 2.5 MG/1
2.5 TABLET ORAL 3 TIMES DAILY PRN
Status: DISCONTINUED | OUTPATIENT
Start: 2024-07-09 | End: 2024-07-16 | Stop reason: HOSPADM

## 2024-07-09 RX ADMIN — LOSARTAN POTASSIUM 12.5 MG: 25 TABLET, FILM COATED ORAL at 18:19

## 2024-07-09 RX ADMIN — Medication 10 ML: at 20:59

## 2024-07-09 RX ADMIN — MORPHINE SULFATE 2 MG: 2 INJECTION, SOLUTION INTRAMUSCULAR; INTRAVENOUS at 10:11

## 2024-07-09 RX ADMIN — PROCAINAMIDE HYDROCHLORIDE 1 MG/MIN: 100 INJECTION, SOLUTION INTRAMUSCULAR; INTRAVENOUS at 01:02

## 2024-07-09 RX ADMIN — Medication 10 ML: at 09:08

## 2024-07-09 RX ADMIN — TICAGRELOR 90 MG: 90 TABLET ORAL at 10:15

## 2024-07-09 RX ADMIN — ATORVASTATIN CALCIUM 80 MG: 80 TABLET, FILM COATED ORAL at 20:58

## 2024-07-09 RX ADMIN — MORPHINE SULFATE 2 MG: 2 INJECTION, SOLUTION INTRAMUSCULAR; INTRAVENOUS at 18:15

## 2024-07-09 RX ADMIN — SODIUM CHLORIDE 100 ML/HR: 9 INJECTION, SOLUTION INTRAVENOUS at 06:19

## 2024-07-09 RX ADMIN — TICAGRELOR 90 MG: 90 TABLET ORAL at 20:58

## 2024-07-09 RX ADMIN — ASPIRIN 81 MG: 81 TABLET, COATED ORAL at 10:15

## 2024-07-09 NOTE — PLAN OF CARE
Goal Outcome Evaluation:  Plan of Care Reviewed With: patient           Outcome Evaluation: Discussed patient care with RN. Pt now strict HOB flat. Hold FEES at this time. Will check back later this week to coordinate care for possible instrumental. RN to contact ST with any changes.

## 2024-07-09 NOTE — PROGRESS NOTES
"                                              LOS: 4 days   Patient Care Team:  Allan Daugherty MD as PCP - General (Internal Medicine)  Samantha Cahu MD as Consulting Physician (Obstetrics and Gynecology)  Gemma Zabala MD as Consulting Physician (Obstetrics and Gynecology)    Chief Complaint:  F/up stroke and critical care management per    Subjective   Interval History    Blood pressure improved today.  Still with significant weakness on the left side.  Very mild aphasia but mostly comprehensible speech.    REVIEW OF SYSTEMS:   CARDIOVASCULAR: No chest pain, chest pressure or chest discomfort. No palpitations or edema.   GASTROINTESTINAL: No anorexia, nausea, vomiting or diarrhea. No abdominal pain.  CONSTITUTIONAL: No fever or chills.     Ventilator/Non-Invasive Ventilation Settings (From admission, onward)      None                  Physical Exam:     Vital Signs  Temp:  [97.4 °F (36.3 °C)-98.4 °F (36.9 °C)] 97.8 °F (36.6 °C)  Heart Rate:  [64-81] 75  Resp:  [17-18] 18  BP: (113-149)/(64-90) 146/84  Arterial Line BP: (126-139)/() 137/74    Intake/Output Summary (Last 24 hours) at 7/9/2024 1604  Last data filed at 7/9/2024 1200  Gross per 24 hour   Intake 3673.87 ml   Output 1055 ml   Net 2618.87 ml     Flowsheet Rows      Flowsheet Row First Filed Value   Admission Height 167.6 cm (66\") Documented at 07/05/2024 1946   Admission Weight 57.6 kg (127 lb) Documented at 07/05/2024 1942            PPE used per hospital policy    General Appearance:   Alert, cooperative, in no acute distress   ENMT:  Mallampati score 3, moist mucous membrane   Eyes:  Pupils equal and reactive to light. EOMI   Neck:   . Trachea midline. No thyromegaly.   Lungs:    Clear to auscultation,respirations regular, even and nonlabored    Heart:   Regular rhythm and normal rate, normal S1 and S2, no         murmur   Skin:   No rash or ecchymosis   Abdomen:     Soft. No tenderness. No HSM.   Neuro/psych: Left facial droop.  Left " arm 1/5.  Left leg 1-2/5.  Mild aphasia.   Extremities:  No cyanosis, clubbing or edema.  Warm extremities and well-perfused          Results Review:        Results from last 7 days   Lab Units 07/09/24  1001 07/08/24  1551 07/08/24  0340 07/07/24  0607   SODIUM mmol/L 133*  --  132* 132*   POTASSIUM mmol/L 3.6 3.9 3.4* 3.6   CHLORIDE mmol/L 103  --  99 99   CO2 mmol/L 21.0*  --  25.0 23.0   BUN mg/dL 19  --  17 22   CREATININE mg/dL 0.50*  --  0.56* 0.56*   GLUCOSE mg/dL 119*  --  154* 126*   CALCIUM mg/dL 7.4*  --  8.1* 8.4*     Results from last 7 days   Lab Units 07/06/24  0007 07/05/24  1936   HSTROP T ng/L 843* 11     Results from last 7 days   Lab Units 07/09/24  1001 07/08/24  0340 07/06/24  0505   WBC 10*3/mm3 12.53* 11.54* 8.52   HEMOGLOBIN g/dL 12.3 12.9 11.8*   HEMATOCRIT % 35.8 38.3 35.5   PLATELETS 10*3/mm3 165 179 200     Results from last 7 days   Lab Units 07/07/24  1805 07/05/24  1936   INR  1.18* 0.97           Results from last 7 days   Lab Units 07/05/24  1936   D DIMER QUANT MCGFEU/mL 1.10*                     I reviewed the patient's new clinical results.        Medication Review:   aspirin, 81 mg, Oral, Daily  atorvastatin, 80 mg, Oral, Nightly  atropine, 1 mg, Intravenous, Once  losartan, 12.5 mg, Oral, Q24H  sodium chloride, 10 mL, Intravenous, Q12H  ticagrelor, 90 mg, Oral, Q12H        procainamide (PRONESTYL) 2,000 mg in dextrose (D5W) 5 % 500 mL IVPB, 1-4 mg/min, Last Rate: Stopped (07/09/24 0929)  sodium chloride, 100 mL/hr, Last Rate: 100 mL/hr (07/09/24 0619)        Diagnostic imaging:  I personally and independently reviewed the following images:  KUOXANA 7/9/2024: Enteric fluid in good position.    Assessment     Acute inferior STEMI s/p PCI distal RCA 7/6  Acute systolic CHF/ischemic cardiomyopathy, EF 35%.  Sinus bradycardia/ 2:1 HB resolved after reperfusion 7/6  VF requiring 1 defibrillation during RCA PCI 7/6  Polymophic VT/torsades-overdrive pacing started 7/7  Right MCA M2  occlusion/MCA ischemic stroke s/p thrombectomy 7/7/2024.  Likely cardioembolic.  Left hemiplegia and left facial droop  Dysphagia  Electrolytes disturbance: Mild hyponatremia, clinically significant in the setting of CHF and hypokalemia  Bibasilar atelectasis      Plan       Procainamide IV.  Temporary pacer removed by cardiology.  AC with aspirin and Brilinta.  Maintenance IV fluid with ongoing mild/H.  Maintain SBP >100 but <150.  MRI brain.  Discussed with neurology (Dr. Schwarz).  Core track today and start tube feeding.  Speech evaluation.  PT OT.  CHF management per cardiology.      Nakul Lora MD  07/09/24  16:04 EDT      Time: Critical care 35 min      This note was dictated utilizing [employer] dictation

## 2024-07-09 NOTE — PLAN OF CARE
Goal Outcome Evaluation:              Outcome Evaluation: Patient remains in CICU. TVP in place. Cortrak inserted and placement verified with xray. Brilinta and asprin orders clarified with Dr. Lebron. Rt femoral atrial sheath pulled per orders with 20 minutes of manual pressure applied, no complications noted. NIH 13-14. Patient A&Ox4 on room air. VSS. Continue to monitor.

## 2024-07-09 NOTE — PLAN OF CARE
Goal Outcome Evaluation:            TVP removed. Procainamide off. No more episodes of VT/ Torsades. NIH still around 15. Pt having visible deficits on L-side. Gaze still deviated, and speech slurred with L-facial droop. Able to wiggle toes, move L-shoulder. Urine output fair. Repeat MRI worsening, notified neuro MD, and Cardiology. Family updated at bedside.
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
## 2024-07-09 NOTE — PROGRESS NOTES
[facility] NEUROSURGERY INTRACRANIAL PROGRESS NOTE    PATIENT IDENTIFICATION:   Name:  Jailene Molina      MRN:  [id number]     73 y.o.  female               CC: M2 occlusion postprocedure day #2 thrombectomy      Subjective     Interval History: No events overnight.    ROS:  Constitutional: No fever, chills  HEENT: No headache, no vision changes, no tinnitus, no hearing difficulties  Neck: no stiffness  GI: No nausea, vomiting, no swallow difficulties  Neuro: + numbness, + weakness, no speech difficulties    Objective     Vital signs in last 24 hours:  Temp:  [97.4 °F (36.3 °C)-98.4 °F (36.9 °C)] 97.7 °F (36.5 °C)  Heart Rate:  [] 72  Resp:  [16-18] 18  BP: (107-143)/(64-90) 142/90  Arterial Line BP: (105-139)/() 137/74    Intake/Output this shift:  No intake/output data recorded.    Intake/Output last 3 shifts:  I/O last 3 completed shifts:  In: 4845.8 [I.V.:4415.8; NG/GT:30; IV Piggyback:400]  Out: 2745 [Urine:2745]    LABS:  Results from last 7 days   Lab Units 07/08/24  0340 07/06/24  0505 07/05/24  1936   WBC 10*3/mm3 11.54* 8.52 6.53   HEMOGLOBIN g/dL 12.9 11.8* 11.7*   HEMATOCRIT % 38.3 35.5 35.6   PLATELETS 10*3/mm3 179 200 245       Results from last 7 days   Lab Units 07/08/24  1551 07/08/24  0340 07/07/24  0607 07/06/24  2031   SODIUM mmol/L  --  132* 132* 134*   POTASSIUM mmol/L 3.9 3.4* 3.6 3.7   CHLORIDE mmol/L  --  99 99 100   CO2 mmol/L  --  25.0 23.0 24.0   BUN mg/dL  --  17 22 21   CREATININE mg/dL  --  0.56* 0.56* 0.60   GLUCOSE mg/dL  --  154* 126* 123*   CALCIUM mg/dL  --  8.1* 8.4* 8.8          IMAGING STUDIES:  [facility] Thromb Prim Nonc Art Ini    Result Date: 7/8/2024  Acute occlusion of the distal right M2 segment with subsequent successful mechanical thrombectomy and TICI 3 flow achieved.  All carotid stenosis measurements were made using NASCET criteria.   This report was finalized on 7/8/2024 9:12 AM by Dr. Danilo Dumont M.D on Workstation: [id number]      CT Angiogram  Neck    Result Date: 7/8/2024   On the CT perfusion study, there is a 60 cc abnormality noted within the lateral aspect of the right frontal lobe on the time to maximum enhancement greater than 6-second maps compatible with an area of high-grade hypoperfusion. No area of acute completed infarction is seen on the CBF less than 30% map. However, of note, there is a larger abnormality seen on the time to maximal enhancement greater than 4 seconds maps within the lateral aspect the right cerebral hemisphere measuring up to 189 cc and this is compatible with an area of lower grade hypoperfusion.  On the CT angiogram of the head, there is a short segment occlusion of the superior division of the right MCA and this is seen approximately 2.5 cm distal to its origin  There is up to a moderate degree of stenosis within the supraclinoid segment of the right ICA and mild to moderate degrees of stenoses are identified elsewhere within the carotid siphons.  There is a severe degree of stenosis within the P3 segment of the right PCA.  There is an approximate 25% NASCET stenosis within the proximal portion of the left ICA. No significant NASCET stenosis is seen within the right ICA although atherosclerotic changes are noted within the right internal carotid artery.  The findings of the noncontrast head CT were discussed with Dr. Stack on 07/07/2024 at approximately 5:42 p.m. The findings of the CT angiogram and CT perfusion study were discussed with Dr. Stack at approximately 6 p.m.   Radiation dose reduction techniques were utilized, including automated exposure control and exposure modulation based on body size.   This report was finalized on 7/8/2024 6:21 AM by Dr. Ollie Lisa M.D on Workstation: [id number]      CT CEREBRAL PERFUSION WITH & WITHOUT CONTRAST    Result Date: 7/8/2024   On the CT perfusion study, there is a 60 cc abnormality noted within the lateral aspect of the right frontal lobe on the time to  maximum enhancement greater than 6-second maps compatible with an area of high-grade hypoperfusion. No area of acute completed infarction is seen on the CBF less than 30% map. However, of note, there is a larger abnormality seen on the time to maximal enhancement greater than 4 seconds maps within the lateral aspect the right cerebral hemisphere measuring up to 189 cc and this is compatible with an area of lower grade hypoperfusion.  On the CT angiogram of the head, there is a short segment occlusion of the superior division of the right MCA and this is seen approximately 2.5 cm distal to its origin  There is up to a moderate degree of stenosis within the supraclinoid segment of the right ICA and mild to moderate degrees of stenoses are identified elsewhere within the carotid siphons.  There is a severe degree of stenosis within the P3 segment of the right PCA.  There is an approximate 25% NASCET stenosis within the proximal portion of the left ICA. No significant NASCET stenosis is seen within the right ICA although atherosclerotic changes are noted within the right internal carotid artery.  The findings of the noncontrast head CT were discussed with Dr. Stack on 07/07/2024 at approximately 5:42 p.m. The findings of the CT angiogram and CT perfusion study were discussed with Dr. Stack at approximately 6 p.m.   Radiation dose reduction techniques were utilized, including automated exposure control and exposure modulation based on body size.   This report was finalized on 7/8/2024 6:21 AM by Dr. Ollie Lisa M.D on Workstation: [id number]      CT Angiogram Head w AI Analysis of LVO    Result Date: 7/8/2024   On the CT perfusion study, there is a 60 cc abnormality noted within the lateral aspect of the right frontal lobe on the time to maximum enhancement greater than 6-second maps compatible with an area of high-grade hypoperfusion. No area of acute completed infarction is seen on the CBF less than 30%  map. However, of note, there is a larger abnormality seen on the time to maximal enhancement greater than 4 seconds maps within the lateral aspect the right cerebral hemisphere measuring up to 189 cc and this is compatible with an area of lower grade hypoperfusion.  On the CT angiogram of the head, there is a short segment occlusion of the superior division of the right MCA and this is seen approximately 2.5 cm distal to its origin  There is up to a moderate degree of stenosis within the supraclinoid segment of the right ICA and mild to moderate degrees of stenoses are identified elsewhere within the carotid siphons.  There is a severe degree of stenosis within the P3 segment of the right PCA.  There is an approximate 25% NASCET stenosis within the proximal portion of the left ICA. No significant NASCET stenosis is seen within the right ICA although atherosclerotic changes are noted within the right internal carotid artery.  The findings of the noncontrast head CT were discussed with Dr. Stack on 07/07/2024 at approximately 5:42 p.m. The findings of the CT angiogram and CT perfusion study were discussed with Dr. Stack at approximately 6 p.m.   Radiation dose reduction techniques were utilized, including automated exposure control and exposure modulation based on body size.   This report was finalized on 7/8/2024 6:21 AM by Dr. Ollie Lisa M.D on Workstation: [id number]      CT Head Without Contrast    Result Date: 7/8/2024  Areas of evolving infarction are noted within the right cerebral hemisphere, which correspond to the areas of hypoperfusion on perfusion imaging. Presence or absence of hemorrhagic transformation is difficult to assess, given presence of residual contrast material. Short-term follow-up suggested, versus consideration for MRI.  Radiation dose reduction techniques were utilized, including automated exposure control and exposure modulation based on body size.   This report was finalized  on 7/8/2024 5:26 AM by Dr. Dot Geronimo M.D on Workstation: [id number]         I personally viewed and interpreted the patient's chart.    Meds reviewed/changed: Yes  Scheduled Meds:aspirin, 81 mg, Oral, Daily  atorvastatin, 80 mg, Oral, Nightly  atropine, 1 mg, Intravenous, Once  midodrine, 5 mg, Oral, TID AC  sodium chloride, 10 mL, Intravenous, Q12H  ticagrelor, 90 mg, Oral, Q12H      Continuous Infusions:procainamide (PRONESTYL) 2,000 mg in dextrose (D5W) 5 % 500 mL IVPB, 1-4 mg/min, Last Rate: 1 mg/min (07/09/24 0102)  sodium chloride, 100 mL/hr, Last Rate: 100 mL/hr (07/09/24 0619)      PRN Meds:.  acetaminophen    atropine    calcium carbonate    Calcium Replacement - Follow Nurse / BPA Driven Protocol    fentaNYL citrate (PF)    Magnesium Cardiology Dose Replacement - Follow Nurse / BPA Driven Protocol    midazolam    Morphine **AND** naloxone    nitroglycerin    ondansetron ODT **OR** ondansetron    Phosphorus Replacement - Follow Nurse / BPA Driven Protocol    Potassium Replacement - Follow Nurse / BPA Driven Protocol    procainamide (PRONESTYL) 2,000 mg in dextrose (D5W) 5 % 500 mL IVPB    sodium chloride    sodium chloride    sodium chloride    Physical exam  Awake, alert, oriented x3  Pupils equal round reactive to light  Extraocular muscles intact  Face symmetric  Speech is fluent and clear  Motor exam  Normal strength on the right, dense left sided beatriz paresis, withdrawals   gait deferred  Able to detect  light touch in all 4 extremities  Groin site flat, soft, no hematoma, pedal pulses present    Assessment & Plan     ASSESSMENT:      Abnormal EKG    Acute ST elevation myocardial infarction (STEMI) involving right coronary artery    Torsades de pointes    Acute ischemic right MCA stroke    73-year-old female recently admitted with STEMI status postcardiac cath when she developed left sided weakness and facial droop.  She was found to have an M2 occlusion and is postprocedure day #1  "thrombectomy.    PLAN:     Would recommend getting systolic as close to 110mmHg as possible to increase perfusion and not higher than 150mmHg to avoid hemorrhage.  Nothing further to add from a neurosurgical standpoint. We will sign off but be available.  She should follow-up with Dr. Dumont in 2 weeks.    I discussed the patient's findings and my recommendations with patient, nursing staff, and primary care team    I spent 35 minutes caring for Jailene Molina on this date of service. This time includes time spent by me in the following activities: preparing for the visit, reviewing tests, obtaining and/or reviewing a separately obtained history, performing a medically appropriate examination and/or evaluation, counseling and educating the patient/family/caregiver, ordering medications, tests, or procedures, referring and communicating with other health care professionals, documenting information in the medical record, independently interpreting results and communicating that information with the patient/family/caregiver, and care coordination          LOS: 4 days       Haily Malone, APRN  7/9/2024  09:26 EDT    \"Dictated utilizing Dragon dictation\".      "

## 2024-07-09 NOTE — TELEPHONE ENCOUNTER
----- Message from Haily Malone sent at 7/9/2024  9:37 AM EDT -----  Regarding: Follow-up  Please have patient follow-up with Dr. Dumont in 2 weeks

## 2024-07-09 NOTE — PROGRESS NOTES
[facility] Follow Up    Chief Complaint: Follow up CAD, CHF, ventricular arrhythmias    Interval History: No ventricular arrhythmias overnight.  No pacing.  Still with significant dysarthria.  Moving her left lower extremity some but not her left upper extremity.  Systolic blood pressures in the 130s to 140s.      Objective:     Objective:  Temp:  [97.4 °F (36.3 °C)-98.4 °F (36.9 °C)] 97.7 °F (36.5 °C)  Heart Rate:  [] 72  Resp:  [16-18] 18  BP: ()/(64-90) 142/90  Arterial Line BP: (105-139)/() 137/74     Intake/Output Summary (Last 24 hours) at 7/9/2024 0744  Last data filed at 7/9/2024 0615  Gross per 24 hour   Intake 3670.5 ml   Output 770 ml   Net 2900.5 ml     Body mass index is 21.72 kg/m².      07/05/24  1942 07/06/24  1145 07/09/24  0612   Weight: 57.6 kg (127 lb) 57.6 kg (127 lb) 61 kg (134 lb 7.7 oz)     Weight change:       Physical Exam:   General : Alert, cooperative, in no acute distress.  Neuro: Alert,cooperative and oriented.  Lungs: CTAB. Normal respiratory effort and rate.  CV: Regular rate and rhythm, normal S1 and S2, no murmurs, gallops or rubs.  ABD: Soft, nontender, nondistended. Positive bowel sounds.  Extr: No edema     Lab Review:   Results from last 7 days   Lab Units 07/08/24  1551 07/08/24  0340 07/07/24  0607 07/06/24  0007 07/05/24  1936   SODIUM mmol/L  --  132* 132*   < > 139   POTASSIUM mmol/L 3.9 3.4* 3.6   < > 3.5   CHLORIDE mmol/L  --  99 99   < > 104   CO2 mmol/L  --  25.0 23.0   < > 22.0   BUN mg/dL  --  17 22   < > 14   CREATININE mg/dL  --  0.56* 0.56*   < > 0.58   GLUCOSE mg/dL  --  154* 126*   < > 123*   CALCIUM mg/dL  --  8.1* 8.4*   < > 8.7   AST (SGOT) U/L  --   --   --   --  24   ALT (SGPT) U/L  --   --   --   --  21    < > = values in this interval not displayed.     Results from last 7 days   Lab Units 07/06/24  0007 07/05/24  1936   HSTROP T ng/L 843* 11     Results from last 7 days   Lab Units 07/08/24  0340 07/06/24  0505    WBC 10*3/mm3 11.54* 8.52   HEMOGLOBIN g/dL 12.9 11.8*   HEMATOCRIT % 38.3 35.5   PLATELETS 10*3/mm3 179 200     Results from last 7 days   Lab Units 07/07/24  1805 07/05/24  1936   INR  1.18* 0.97     Results from last 7 days   Lab Units 07/08/24  0340 07/07/24  0607   MAGNESIUM mg/dL 2.0 2.3     Results from last 7 days   Lab Units 07/08/24  0340 07/06/24  0505   CHOLESTEROL mg/dL 135 157   TRIGLYCERIDES mg/dL 86 80   HDL CHOL mg/dL 42 54         Results from last 7 days   Lab Units 07/05/24  1936   TSH uIU/mL 3.900     I reviewed the patient's new clinical results.  I personally viewed and interpreted the patient's EKG  Current Medications:   Scheduled Meds:aspirin, 81 mg, Oral, Daily  atorvastatin, 80 mg, Oral, Nightly  atropine, 1 mg, Intravenous, Once  midodrine, 5 mg, Oral, TID AC  sodium chloride, 10 mL, Intravenous, Q12H  ticagrelor, 90 mg, Oral, Q12H      Continuous Infusions:procainamide (PRONESTYL) 2,000 mg in dextrose (D5W) 5 % 500 mL IVPB, 1-4 mg/min, Last Rate: 1 mg/min (07/09/24 0102)  sodium chloride, 100 mL/hr, Last Rate: 100 mL/hr (07/09/24 0619)        Allergies:  No Known Allergies    Assessment/Plan:     Inferior myocardial infarction.  Status post drug eluting stent placement of the distal RCA on 7/5.  Unfortunately did not receive aspirin or ticagrelor since 7/7 morning.  Ticagrelor reloaded on night of 7/8 after NGT placed.   Coronary artery disease.  Status post drug eluting stent placement of the left main and proximal LAD on 7/6.  Heart block.  On initial presentation due to #1.  Resolved following RCA revascularization.   Ventricular arrhythmias.  Ventricular fibrillation during revascularization on 7/6.  Developed polymorphic VT on 7/7.  Placed on amiodarone and then procainamide followed by temporary pacer wire for overdrive pacing.  Amiodarone stopped since it appeared to be exacerbating episodes.    Ischemic cardiomyopathy. EF of 35%.   Cardioembolic stroke.   Following repeat  diagnostic cath on 7/7. Right MCA stroke status post M2 thrombectomy.  Worsening symptoms on 7/8.  Neurosurgery recommend SBP of 110-150- Midodrine added to help with elevated BP.  MRI once temporary wire out.      -Discussed with Dr. Lyles.  Since no further arrhythmias overnight I went ahead and remove the temporary venous pacer wire.  If no further issues we will have the right femoral vein sheath removed later this morning.  - Will defer MRI to neurology now that the pacer wires out.  - Continue procainamide for now and await further recommendations from Dr. Lyles.  - Continue aspirin, ticagrelor, and statin.  -Since BP has improved without midodrine will change to as needed.  -Will avoid beta-blockers for the time being since bradycardia seem to trigger her torsades but consider starting when okay from EP standpoint.  - Will consider starting low-dose ARB today depending on her lab work in order to bring her blood pressures under better control (but keeping above a systolic pressure of 110).    Tierra Espinoza MD  07/09/24  07:44 EDT

## 2024-07-09 NOTE — SIGNIFICANT NOTE
07/09/24 0815   OTHER   Discipline physical therapist   Rehab Time/Intention   Session Not Performed other (see comments)  (Pt continues to have strict bed rest orders and transvenous pacer. PT will check back tomorrow.)   Recommendation   PT - Next Appointment 07/10/24

## 2024-07-10 ENCOUNTER — APPOINTMENT (OUTPATIENT)
Dept: GENERAL RADIOLOGY | Facility: HOSPITAL | Age: 74
End: 2024-07-10
Payer: MEDICARE

## 2024-07-10 LAB
ANION GAP SERPL CALCULATED.3IONS-SCNC: 9 MMOL/L (ref 5–15)
BUN SERPL-MCNC: 15 MG/DL (ref 8–23)
BUN/CREAT SERPL: 50 (ref 7–25)
CALCIUM SPEC-SCNC: 7.2 MG/DL (ref 8.6–10.5)
CHLORIDE SERPL-SCNC: 105 MMOL/L (ref 98–107)
CO2 SERPL-SCNC: 19 MMOL/L (ref 22–29)
CREAT SERPL-MCNC: 0.3 MG/DL (ref 0.57–1)
DEPRECATED RDW RBC AUTO: 44.1 FL (ref 37–54)
EGFRCR SERPLBLD CKD-EPI 2021: 112.2 ML/MIN/1.73
ERYTHROCYTE [DISTWIDTH] IN BLOOD BY AUTOMATED COUNT: 13.1 % (ref 12.3–15.4)
GLUCOSE BLDC GLUCOMTR-MCNC: 95 MG/DL (ref 70–130)
GLUCOSE BLDC GLUCOMTR-MCNC: 98 MG/DL (ref 70–130)
GLUCOSE SERPL-MCNC: 100 MG/DL (ref 65–99)
HCT VFR BLD AUTO: 38 % (ref 34–46.6)
HGB BLD-MCNC: 12.7 G/DL (ref 12–15.9)
MCH RBC QN AUTO: 30.8 PG (ref 26.6–33)
MCHC RBC AUTO-ENTMCNC: 33.4 G/DL (ref 31.5–35.7)
MCV RBC AUTO: 92 FL (ref 79–97)
PLATELET # BLD AUTO: 166 10*3/MM3 (ref 140–450)
PMV BLD AUTO: 10.3 FL (ref 6–12)
POTASSIUM SERPL-SCNC: 3.4 MMOL/L (ref 3.5–5.2)
POTASSIUM SERPL-SCNC: 3.8 MMOL/L (ref 3.5–5.2)
RBC # BLD AUTO: 4.13 10*6/MM3 (ref 3.77–5.28)
SODIUM SERPL-SCNC: 133 MMOL/L (ref 136–145)
WBC NRBC COR # BLD AUTO: 10.87 10*3/MM3 (ref 3.4–10.8)

## 2024-07-10 PROCEDURE — 92611 MOTION FLUOROSCOPY/SWALLOW: CPT

## 2024-07-10 PROCEDURE — 84132 ASSAY OF SERUM POTASSIUM: CPT | Performed by: INTERNAL MEDICINE

## 2024-07-10 PROCEDURE — 82948 REAGENT STRIP/BLOOD GLUCOSE: CPT

## 2024-07-10 PROCEDURE — 85027 COMPLETE CBC AUTOMATED: CPT | Performed by: INTERNAL MEDICINE

## 2024-07-10 PROCEDURE — 99232 SBSQ HOSP IP/OBS MODERATE 35: CPT | Performed by: INTERNAL MEDICINE

## 2024-07-10 PROCEDURE — 97530 THERAPEUTIC ACTIVITIES: CPT

## 2024-07-10 PROCEDURE — 97112 NEUROMUSCULAR REEDUCATION: CPT

## 2024-07-10 PROCEDURE — 99231 SBSQ HOSP IP/OBS SF/LOW 25: CPT | Performed by: PSYCHIATRY & NEUROLOGY

## 2024-07-10 PROCEDURE — 97166 OT EVAL MOD COMPLEX 45 MIN: CPT

## 2024-07-10 PROCEDURE — 74230 X-RAY XM SWLNG FUNCJ C+: CPT

## 2024-07-10 PROCEDURE — 97162 PT EVAL MOD COMPLEX 30 MIN: CPT

## 2024-07-10 PROCEDURE — 80048 BASIC METABOLIC PNL TOTAL CA: CPT | Performed by: INTERNAL MEDICINE

## 2024-07-10 RX ORDER — LOSARTAN POTASSIUM 25 MG/1
25 TABLET ORAL
Status: DISCONTINUED | OUTPATIENT
Start: 2024-07-10 | End: 2024-07-16 | Stop reason: HOSPADM

## 2024-07-10 RX ORDER — POTASSIUM CHLORIDE 1.5 G/1.58G
40 POWDER, FOR SOLUTION ORAL EVERY 4 HOURS
Status: COMPLETED | OUTPATIENT
Start: 2024-07-10 | End: 2024-07-10

## 2024-07-10 RX ORDER — SODIUM CHLORIDE 9 MG/ML
50 INJECTION, SOLUTION INTRAVENOUS CONTINUOUS
Status: DISCONTINUED | OUTPATIENT
Start: 2024-07-10 | End: 2024-07-15

## 2024-07-10 RX ADMIN — ASPIRIN 81 MG: 81 TABLET, COATED ORAL at 08:05

## 2024-07-10 RX ADMIN — TICAGRELOR 90 MG: 90 TABLET ORAL at 08:05

## 2024-07-10 RX ADMIN — BARIUM SULFATE 1 TEASPOON(S): 0.6 CREAM ORAL at 13:41

## 2024-07-10 RX ADMIN — BARIUM SULFATE 50 ML: 400 SUSPENSION ORAL at 13:41

## 2024-07-10 RX ADMIN — TICAGRELOR 90 MG: 90 TABLET ORAL at 22:04

## 2024-07-10 RX ADMIN — POTASSIUM CHLORIDE 40 MEQ: 1.5 POWDER, FOR SOLUTION ORAL at 11:59

## 2024-07-10 RX ADMIN — BARIUM SULFATE 4 ML: 980 POWDER, FOR SUSPENSION ORAL at 13:41

## 2024-07-10 RX ADMIN — ATORVASTATIN CALCIUM 80 MG: 80 TABLET, FILM COATED ORAL at 22:04

## 2024-07-10 RX ADMIN — Medication 10 ML: at 08:05

## 2024-07-10 RX ADMIN — BARIUM SULFATE 55 ML: 0.81 POWDER, FOR SUSPENSION ORAL at 13:41

## 2024-07-10 RX ADMIN — LOSARTAN POTASSIUM 25 MG: 25 TABLET, FILM COATED ORAL at 08:05

## 2024-07-10 RX ADMIN — POTASSIUM CHLORIDE 40 MEQ: 1.5 POWDER, FOR SOLUTION ORAL at 08:05

## 2024-07-10 RX ADMIN — Medication 10 ML: at 22:05

## 2024-07-10 NOTE — PROGRESS NOTES
[facility] Follow Up    Chief Complaint: Follow up CAD, stroke    Interval History: She was asleep and resting comfortably during my exam.  The patient's older and younger brother were at bedside.  They deny any recent significant complaints.    Objective:     Objective:  Temp:  [97.5 °F (36.4 °C)-98.2 °F (36.8 °C)] 97.6 °F (36.4 °C)  Heart Rate:  [67-77] 71  BP: (131-149)/(77-90) 131/80     Intake/Output Summary (Last 24 hours) at 7/10/2024 0739  Last data filed at 7/10/2024 0400  Gross per 24 hour   Intake 761.37 ml   Output 1475 ml   Net -713.63 ml     Body mass index is 21.72 kg/m².      07/05/24  1942 07/06/24  1145 07/09/24  0612   Weight: 57.6 kg (127 lb) 57.6 kg (127 lb) 61 kg (134 lb 7.7 oz)     Weight change:       Physical Exam:   General : Alert, cooperative, in no acute distress.  Neuro: Alert,cooperative and oriented.  Lungs: CTAB. Normal respiratory effort and rate.  CV: Regular rate and rhythm, normal S1 and S2, no murmurs, gallops or rubs.  ABD: Soft, nontender, nondistended. Positive bowel sounds.  Extr: No edema     Lab Review:   Results from last 7 days   Lab Units 07/10/24  0412 07/09/24  1001 07/06/24  0007 07/05/24  1936   SODIUM mmol/L 133* 133*   < > 139   POTASSIUM mmol/L 3.4* 3.6   < > 3.5   CHLORIDE mmol/L 105 103   < > 104   CO2 mmol/L 19.0* 21.0*   < > 22.0   BUN mg/dL 15 19   < > 14   CREATININE mg/dL 0.30* 0.50*   < > 0.58   GLUCOSE mg/dL 100* 119*   < > 123*   CALCIUM mg/dL 7.2* 7.4*   < > 8.7   AST (SGOT) U/L  --   --   --  24   ALT (SGPT) U/L  --   --   --  21    < > = values in this interval not displayed.     Results from last 7 days   Lab Units 07/06/24  0007 07/05/24  1936   HSTROP T ng/L 843* 11     Results from last 7 days   Lab Units 07/10/24  0412 07/09/24  1001   WBC 10*3/mm3 10.87* 12.53*   HEMOGLOBIN g/dL 12.7 12.3   HEMATOCRIT % 38.0 35.8   PLATELETS 10*3/mm3 166 165     Results from last 7 days   Lab Units 07/07/24  1805 07/05/24  1936   INR   1.18* 0.97     Results from last 7 days   Lab Units 07/08/24  0340 07/07/24  0607   MAGNESIUM mg/dL 2.0 2.3     Results from last 7 days   Lab Units 07/08/24  0340 07/06/24  0505   CHOLESTEROL mg/dL 135 157   TRIGLYCERIDES mg/dL 86 80   HDL CHOL mg/dL 42 54         Results from last 7 days   Lab Units 07/05/24  1936   TSH uIU/mL 3.900     I reviewed the patient's new clinical results.  I personally viewed and interpreted the patient's EKG  Current Medications:   Scheduled Meds:aspirin, 81 mg, Oral, Daily  atorvastatin, 80 mg, Oral, Nightly  atropine, 1 mg, Intravenous, Once  losartan, 12.5 mg, Oral, Q24H  potassium chloride, 40 mEq, Oral, Q4H  sodium chloride, 10 mL, Intravenous, Q12H  ticagrelor, 90 mg, Oral, Q12H      Continuous Infusions:procainamide (PRONESTYL) 2,000 mg in dextrose (D5W) 5 % 500 mL IVPB, 1-4 mg/min, Last Rate: Stopped (07/09/24 0929)  sodium chloride, 100 mL/hr, Last Rate: 100 mL/hr (07/09/24 0619)        Allergies:  No Known Allergies    Assessment/Plan:     Inferior myocardial infarction.  Status post drug eluting stent placement of the distal RCA on 7/5.  Unfortunately did not receive aspirin or ticagrelor since 7/7 morning.  Ticagrelor reloaded on night of 7/8 after NGT placed.   Coronary artery disease.  Status post drug eluting stent placement of the left main and proximal LAD on 7/6.  Heart block.  On initial presentation due to #1.  Resolved following RCA revascularization.   Ventricular arrhythmias.  Ventricular fibrillation during revascularization on 7/6.  Developed polymorphic VT on 7/7.  Placed on amiodarone and then procainamide followed by temporary pacer wire for overdrive pacing.  Amiodarone stopped since it appeared to be exacerbating episodes.  Procainamide stopped on 7/9 without recurrent arrhythmias.    Ischemic cardiomyopathy. EF of 35%.  Started on losartan.   Cardioembolic stroke.   Following repeat diagnostic cath on 7/7. Right MCA stroke status post M2 thrombectomy.   Worsening symptoms on 7/8.  Neurosurgery recommend SBP of 110-150- MRI on 7/9 with bilateral cerebral and cerebellar embolic stroke more on the right.  Neurology evaluated the patient today.  It appears that they feel she has a good prognosis from standpoint of her left lower extremity weakness and her neglect and that she will be able to ambulate in the future.  Not as hopeful about her left upper extremity prognosis.  Hypertension.  Better controlled with low dose losartan.      -Increase losartan to 25 mg daily.  -Continue aspirin, ticagrelor, and atorvastatin.  - Discussed beta-blockers with Dr. Lyles.  He recommends waiting a couple more days to ensure that she is still stable from a rhythm standpoint in terms of bradycardia.  At that point we will consider starting carvedilol 3.125 mg twice daily.  - Otherwise she appears to be stable from a cardiac standpoint.  [facility] is okay to [facility] from my standpoint.    Discussed above with the patient's brothers at bedside.    Tierra Espinoza MD  07/10/24  07:39 EDT

## 2024-07-10 NOTE — DISCHARGE PLACEMENT REQUEST
"Raven Foley \"Linda\" (73 y.o. Female)       Date of Birth   1950    Social Security Number       Address   [address] [place], [place] [address]    Home Phone   [phone]    MRN   [id number]       Yazidism   Congregational    Marital Status   Single                            Admission Date   7/5/24    Admission Type   Emergency    Admitting Provider   Maya Lebron MD    Attending Provider   Maya Lebron MD    Department, Room/Bed   [facility] CARDIAC INTENSIVE CARE, 3008/1       Discharge Date       Discharge Disposition       Discharge Destination                                 Attending Provider: Maya Lebron MD    Allergies: No Known Allergies    Isolation: None   Infection: None   Code Status: CPR    Ht: 167.6 cm (65.98\")   Wt: 61 kg (134 lb 7.7 oz)    Admission Cmt: None   Principal Problem: Abnormal EKG [R94.31]                   Active Insurance as of 7/5/2024       Primary Coverage       Payor Plan Insurance Group Employer/Plan Group    MEDICARE MEDICARE A & B        Payor Plan Address Payor Plan Phone Number Payor Plan Fax Number Effective Dates    [address] [phone]  6/1/1993 - None Entered    [facility] [id number]         Subscriber Name Subscriber Birth Date Member ID       RAVNE FOLEY 1950 [id number]               Secondary Coverage       Payor Plan Insurance Group Employer/Plan Group    [facility] SUP PLAN F       Payor Plan Address Payor Plan Phone Number Payor Plan Fax Number Effective Dates    [address] [phone]  11/1/2015 - None Entered    [facility] [id number]         Subscriber Name Subscriber Birth Date Member ID       RAVEN FOLEY 1950 [id number]                     Emergency Contacts        (Rel.) Home Phone Work Phone Mobile Phone    CAT HOLLAND (Friend) [phone] -- [phone]    Joslyn Koo (Other) -- -- [phone]    Jayy foley (Brother) -- -- [phone]                "

## 2024-07-10 NOTE — PROGRESS NOTES
NEUROLOGY FOLLOW-UP - STROKE TEAM    DOS: 7/10/2024  NAME: Jailene Molina   : 1950  PCP: Allan Daugherty MD  CC: Stroke  Referring MD: No ref. provider found  Neurological Problem and Interval History:  73 y.o. female Follow-up from right MCA partial stroke status post thrombectomy. Patient with MI several days before as likely cause. TTE without thrombus although severe hypokinesis on .     Medications On Admission  Medications Prior to Admission   Medication Sig Dispense Refill Last Dose    Cholecalciferol (Vitamin D-3) 25 MCG (1000 UT) capsule Take  by mouth.   2024    acetaminophen (TYLENOL) 500 MG tablet 2 tabs PO Q 8 hours PRN 30 tablet 0     celecoxib (CeleBREX) 200 MG capsule Take 1 capsule by mouth Daily. 90 capsule 0 More than a month    Dietary Management Product (FOSTEUM PLUS PO) Take  by mouth. Take 1 twice daily       ibuprofen (ADVIL,MOTRIN) 200 MG tablet Take 1 tablet by mouth Every 6 (Six) Hours As Needed for Mild Pain.            Laboratory Results:   Lab Results   Component Value Date    GLUCOSE 100 (H) 07/10/2024    CALCIUM 7.2 (L) 07/10/2024     (L) 07/10/2024    K 3.4 (L) 07/10/2024    CO2 19.0 (L) 07/10/2024     07/10/2024    BUN 15 07/10/2024    CREATININE 0.30 (L) 07/10/2024    EGFRIFAFRI 104 2021    EGFRIFNONA 86 2021    BCR 50.0 (H) 07/10/2024    ANIONGAP 9.0 07/10/2024     Lab Results   Component Value Date    WBC 10.87 (H) 07/10/2024    HGB 12.7 07/10/2024    HCT 38.0 07/10/2024    MCV 92.0 07/10/2024     07/10/2024     Lab Results   Component Value Date    CHOL 135 2024    CHOL 157 2024     Lab Results   Component Value Date    HDL 42 2024    HDL 54 2024    HDL 54 2023     Lab Results   Component Value Date    LDL 76 2024    LDL 88 2024     (H) 2023     Lab Results   Component Value Date    TRIG 86 2024    TRIG 80 2024    TRIG 92 2023     Lab Results   Component Value  "Date    HGBA1C 5.20 07/06/2024     Lab Results   Component Value Date    INR 1.18 (H) 07/07/2024    INR 0.97 07/05/2024    PROTIME 15.2 (H) 07/07/2024    PROTIME 13.1 07/05/2024         Physical Examination:   /91   Pulse 77   Temp 97.6 °F (36.4 °C) (Oral)   Resp 18   Ht 167.6 cm (65.98\")   Wt 61 kg (134 lb 7.7 oz)   SpO2 96%   BMI 21.72 kg/m²       Neurological examination:  Alert, left visual field cut but improved visual neglect. Right gaze preference, left facial weakness with mild to moderate labial and palatal dysarthria. Left arm nearly flaccid, had a very slight finger curl today briefly. Still with diminished sensation to double simultaneous stimulation, left leg externally rotated.  .    Diagnoses / Discussion:    1) Right MCA cardioembolic stroke status post thrombectomy with large but not complete stroke burden. Despite the appearance and current physical examination, patient with a fair to good prognosis to walk again. Left arm prognosis may be lower. Counseled patient and family about recovery and importance of exercise. Currently, neglect and apraxia may be making weakness appear much worse than it will be a week or so from now as the neglect wears off.   2) No hemorrhagic change. Okay from neurostandpoint to start anticoagulation if needed tomorrow but patient already on brilinta.    Plan:  Follow-up with stroke clinic outpatient one month after hospital discharge.  Continue current regimen    Will sign off, feel free to recontact with any further questions or concerns.    I have discussed the above with the patient   Time spent with patient: 30min    MDM  Reviewed: previous chart, nursing note and vitals  Reviewed previous: labs, MRI and CT scan  Interpretation: CT scan, MRI and labs  Total time providing critical care: 30-74 minutes. This excludes time spent performing separately reportable procedures and services.         David Salamanca MD  Neurology         "
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
## 2024-07-10 NOTE — PROGRESS NOTES
"                                              LOS: 5 days   Patient Care Team:  Allan Daugherty MD as PCP - General (Internal Medicine)  Samantha Chau MD as Consulting Physician (Obstetrics and Gynecology)  Gemma Zabala MD as Consulting Physician (Obstetrics and Gynecology)    Chief Complaint:  F/up stroke and critical care management per    Subjective   Interval History    No change in neurostatus.  MRI brain reviewed.  Has a core track but has not initiated tube feeding yet.  Mildly aphasic.  Weak on the left side.    REVIEW OF SYSTEMS:   CARDIOVASCULAR: No chest pain, chest pressure or chest discomfort. No palpitations or edema.   GASTROINTESTINAL: No anorexia, nausea, vomiting or diarrhea. No abdominal pain.  CONSTITUTIONAL: No fever or chills.     Ventilator/Non-Invasive Ventilation Settings (From admission, onward)      None                  Physical Exam:     Vital Signs  Temp:  [97.5 °F (36.4 °C)-98.2 °F (36.8 °C)] 97.6 °F (36.4 °C)  Heart Rate:  [67-77] 71  BP: (131-149)/(77-90) 131/80    Intake/Output Summary (Last 24 hours) at 7/10/2024 0903  Last data filed at 7/10/2024 0400  Gross per 24 hour   Intake 358 ml   Output 1200 ml   Net -842 ml     Flowsheet Rows      Flowsheet Row First Filed Value   Admission Height 167.6 cm (66\") Documented at 07/05/2024 1946   Admission Weight 57.6 kg (127 lb) Documented at 07/05/2024 1942            PPE used per hospital policy    General Appearance:   Alert, cooperative, in no acute distress   ENMT:  Mallampati score 3, moist mucous membrane   Eyes:  Pupils equal and reactive to light. EOMI   Neck:   . Trachea midline. No thyromegaly.   Lungs:    Clear to auscultation,respirations regular, even and nonlabored    Heart:   RRR, no  murmur   Skin:   No rash or ecchymosis   Abdomen:     Soft. No tenderness. No HSM.   Neuro/psych: Left facial droop.  Left arm 1/5.  Left leg 1-2/5.  Mild aphasia.   Extremities:  No cyanosis, clubbing or edema.  Warm extremities and " well-perfused          Results Review:        Results from last 7 days   Lab Units 07/10/24  0412 07/09/24  1001 07/08/24  1551 07/08/24  0340   SODIUM mmol/L 133* 133*  --  132*   POTASSIUM mmol/L 3.4* 3.6 3.9 3.4*   CHLORIDE mmol/L 105 103  --  99   CO2 mmol/L 19.0* 21.0*  --  25.0   BUN mg/dL 15 19  --  17   CREATININE mg/dL 0.30* 0.50*  --  0.56*   GLUCOSE mg/dL 100* 119*  --  154*   CALCIUM mg/dL 7.2* 7.4*  --  8.1*     Results from last 7 days   Lab Units 07/06/24  0007 07/05/24  1936   HSTROP T ng/L 843* 11     Results from last 7 days   Lab Units 07/10/24  0412 07/09/24  1001 07/08/24  0340   WBC 10*3/mm3 10.87* 12.53* 11.54*   HEMOGLOBIN g/dL 12.7 12.3 12.9   HEMATOCRIT % 38.0 35.8 38.3   PLATELETS 10*3/mm3 166 165 179     Results from last 7 days   Lab Units 07/07/24  1805 07/05/24  1936   INR  1.18* 0.97           Results from last 7 days   Lab Units 07/05/24  1936   D DIMER QUANT MCGFEU/mL 1.10*                     I reviewed the patient's new clinical results.        Medication Review:   aspirin, 81 mg, Oral, Daily  atorvastatin, 80 mg, Oral, Nightly  atropine, 1 mg, Intravenous, Once  losartan, 25 mg, Oral, Q24H  potassium chloride, 40 mEq, Oral, Q4H  sodium chloride, 10 mL, Intravenous, Q12H  ticagrelor, 90 mg, Oral, Q12H        procainamide (PRONESTYL) 2,000 mg in dextrose (D5W) 5 % 500 mL IVPB, 1-4 mg/min, Last Rate: Stopped (07/09/24 0929)  sodium chloride, 100 mL/hr, Last Rate: 100 mL/hr (07/09/24 0619)        Diagnostic imaging:  I personally and independently reviewed the following images:  KUB 7/9/2024: Enteric fluid in good position.  MRI brain 7/9/2024: Multifocal bilateral cerebellar and cerebral strokes, the largest located on the right.    Assessment     Acute inferior STEMI s/p PCI distal RCA 7/6  Acute systolic CHF/ischemic cardiomyopathy, EF 35%.  Sinus bradycardia/ 2:1 HB resolved after reperfusion 7/6  VF requiring 1 defibrillation during RCA PCI 7/6  Polymophic VT/torsades-overdrive  pacing started 7/7  Right MCA M2 occlusion/MCA ischemic stroke s/p thrombectomy 7/7/2024.  Likely cardioembolic.  Left hemiplegia and left facial droop  Dysphagia  Electrolytes disturbance: Mild hyponatremia, clinically significant in the setting of CHF and hypokalemia  Bibasilar atelectasis      Plan       Procainamide IV.  Temporary pacer removed by cardiology.  AC with aspirin and Brilinta.  IV fluid reduced to 50 mill/H due to CHF.    Speech eval.  Core track in place.  Will start tube feeding if she fails.  CHF management per cardiology.  Losartan 25 mg daily    Discussed with her brother at bedside.  Discussed with ICU staff.  Dispo: Transfer to telemetry.      Nakul Lora MD  07/10/24  09:03 EDT          This note was dictated utilizing [employer] dictation

## 2024-07-10 NOTE — PLAN OF CARE
Goal Outcome Evaluation:  Plan of Care Reviewed With: patient           Outcome Evaluation: VFSS completed this date. Pt presents with moderate oropharyngeal dysphagia marked by poor bolus control, delayed swallow initiation, and sluggish oral transit. Silent aspiration of thin liquid by straw and thin liquid portion of mixed consistency during the swallow. Cued cough is weak and does not clear residual from trachea or laryngeal vestibule. x1 instance high trace nontransient penetration during the swallow with nectar thick by tsp. No penetration or aspiration visualized with subsequent trials of nectar thick liquid, honey thick liquid, puree, or regular solid. Residue present on vocal cords and laryngeal vestibule however 2/2 aspiration of thin liquid and mixed trials. Recommend pt initiate mechanical ground, nectar thick liquids, NO MIXED consistencies, meds whole in puree. Pt may have ice and small sips of water by tsp in between meals (must have thickened liquid during meals) and after oral care. Strict aspiration precautions (sit fully upright, small bites/drinks, slow rate). Feed assist. SLP to follow for dysphagia tx and diet tolerance as appropriate. Recommend skilled ST at next level of care for dysphagia.      Anticipated Discharge Disposition (SLP): unknown          SLP Swallowing Diagnosis: moderate, oral dysphagia, pharyngeal dysphagia (07/10/24 1519)  Treatment Assessment (SLP): continued (07/10/24 1519)     Plan for Continued Treatment (SLP): continue treatment per plan of care (07/10/24 1519)
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
## 2024-07-10 NOTE — CASE MANAGEMENT/SOCIAL WORK
Continued Stay Note  [facility]     Patient Name: Jailene Molina  MRN: [id number]  Today's Date: 7/10/2024    Admit Date: 7/5/2024    Plan: SNF referrals pending   Discharge Plan       Row Name 07/10/24 1147       Plan    Plan SNF referrals pending    Plan Comments Referrals requested for Gino Tejeda and Kerry Arnold                   Discharge Codes    No documentation.                       Blanca Santos RN

## 2024-07-10 NOTE — PLAN OF CARE
Goal Outcome Evaluation:  Plan of Care Reviewed With: patient           Outcome Evaluation: Pt is 74 yo female admitted to [facility] with MI, had cardiac cath, then had M2 occlusion and thrombectomy. Patient lives alone, independent with mobility prior to admission. patient has 6 steps to enter home, full flight inside home to bedroom. Today, patient awake and alert, able to answer all questions correctly, required maxAx2 for bed mobility and min/modA for sitting balance on EOB. Patient demonstrates impairments consisting of significant L sided weakness, L neglect, decreased balance, decreased activity tolerance and would benefit from skilled PT. Recommend inpt rehab at d/c.      Anticipated Discharge Disposition (PT): inpatient rehabilitation facility
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
## 2024-07-10 NOTE — PLAN OF CARE
Goal Outcome Evaluation:  Plan of Care Reviewed With: patient           Outcome Evaluation: Pt is a 72 y/o F admitted with STEMI after exercising. Acute right MCA CVA after having cardiac cath, now status post thrombectomy. Was on strict bedrest following transvenous pacer, now removed and pt off bedrest per RN. MRI on 7/9 with bilateral cerebral and cerebellar embolic stroke more on the right. Pt lives alone and reports being indep with ADLs/fxl mobility w/o AD. Presents today with LUE flaccidity (trace movement in L digits) and significant L sided neglect. Able to visually scan past midline with multiple cues but mostly R gaze preference. Educated family on cues for attending to L side. MaxAx2 for bed mobility and modA for sitting balance. Pt will benefit from OT services in the acute care setting. Recommending d/c IPR to promote return to PLOF.      Anticipated Discharge Disposition (OT): inpatient rehabilitation facility

## 2024-07-10 NOTE — PLAN OF CARE
Goal Outcome Evaluation:  Plan of Care Reviewed With: patient           Outcome Evaluation: Patient is cleared for upright positioning per RN. Plan for VFSS 1315.

## 2024-07-11 PROBLEM — R19.7 DIARRHEA: Status: ACTIVE | Noted: 2024-07-11

## 2024-07-11 LAB
ANION GAP SERPL CALCULATED.3IONS-SCNC: 11.2 MMOL/L (ref 5–15)
BUN SERPL-MCNC: 15 MG/DL (ref 8–23)
BUN/CREAT SERPL: 36.6 (ref 7–25)
CALCIUM SPEC-SCNC: 6.8 MG/DL (ref 8.6–10.5)
CHLORIDE SERPL-SCNC: 104 MMOL/L (ref 98–107)
CO2 SERPL-SCNC: 17.8 MMOL/L (ref 22–29)
CREAT SERPL-MCNC: 0.41 MG/DL (ref 0.57–1)
DEPRECATED RDW RBC AUTO: 44.3 FL (ref 37–54)
EGFRCR SERPLBLD CKD-EPI 2021: 104 ML/MIN/1.73
ERYTHROCYTE [DISTWIDTH] IN BLOOD BY AUTOMATED COUNT: 13.1 % (ref 12.3–15.4)
GLUCOSE SERPL-MCNC: 124 MG/DL (ref 65–99)
HCT VFR BLD AUTO: 37.7 % (ref 34–46.6)
HGB BLD-MCNC: 13 G/DL (ref 12–15.9)
MAGNESIUM SERPL-MCNC: 2.2 MG/DL (ref 1.6–2.4)
MCH RBC QN AUTO: 31.5 PG (ref 26.6–33)
MCHC RBC AUTO-ENTMCNC: 34.5 G/DL (ref 31.5–35.7)
MCV RBC AUTO: 91.3 FL (ref 79–97)
PLATELET # BLD AUTO: 199 10*3/MM3 (ref 140–450)
PMV BLD AUTO: 10 FL (ref 6–12)
POTASSIUM SERPL-SCNC: 2.5 MMOL/L (ref 3.5–5.2)
RBC # BLD AUTO: 4.13 10*6/MM3 (ref 3.77–5.28)
SODIUM SERPL-SCNC: 133 MMOL/L (ref 136–145)
WBC NRBC COR # BLD AUTO: 12.48 10*3/MM3 (ref 3.4–10.8)

## 2024-07-11 PROCEDURE — 25010000002 CALCIUM GLUCONATE-NACL 1-0.675 GM/50ML-% SOLUTION: Performed by: INTERNAL MEDICINE

## 2024-07-11 PROCEDURE — 80048 BASIC METABOLIC PNL TOTAL CA: CPT | Performed by: INTERNAL MEDICINE

## 2024-07-11 PROCEDURE — 92526 ORAL FUNCTION THERAPY: CPT

## 2024-07-11 PROCEDURE — 99232 SBSQ HOSP IP/OBS MODERATE 35: CPT | Performed by: INTERNAL MEDICINE

## 2024-07-11 PROCEDURE — 25010000002 POTASSIUM CHLORIDE 10 MEQ/100ML SOLUTION: Performed by: INTERNAL MEDICINE

## 2024-07-11 PROCEDURE — 85027 COMPLETE CBC AUTOMATED: CPT | Performed by: INTERNAL MEDICINE

## 2024-07-11 PROCEDURE — 25010000002 CALCIUM GLUCONATE 2-0.675 GM/100ML-% SOLUTION: Performed by: INTERNAL MEDICINE

## 2024-07-11 PROCEDURE — 25810000003 SODIUM CHLORIDE 0.9 % SOLUTION: Performed by: INTERNAL MEDICINE

## 2024-07-11 PROCEDURE — 25010000002 ENOXAPARIN PER 10 MG: Performed by: INTERNAL MEDICINE

## 2024-07-11 PROCEDURE — 83735 ASSAY OF MAGNESIUM: CPT | Performed by: INTERNAL MEDICINE

## 2024-07-11 RX ORDER — POTASSIUM CHLORIDE 7.45 MG/ML
10 INJECTION INTRAVENOUS
Status: COMPLETED | OUTPATIENT
Start: 2024-07-11 | End: 2024-07-11

## 2024-07-11 RX ORDER — CALCIUM GLUCONATE 20 MG/ML
1000 INJECTION, SOLUTION INTRAVENOUS
Status: COMPLETED | OUTPATIENT
Start: 2024-07-11 | End: 2024-07-11

## 2024-07-11 RX ORDER — CALCIUM GLUCONATE 20 MG/ML
2000 INJECTION, SOLUTION INTRAVENOUS
Status: COMPLETED | OUTPATIENT
Start: 2024-07-11 | End: 2024-07-11

## 2024-07-11 RX ORDER — ENOXAPARIN SODIUM 100 MG/ML
40 INJECTION SUBCUTANEOUS DAILY
Status: DISCONTINUED | OUTPATIENT
Start: 2024-07-11 | End: 2024-07-16 | Stop reason: HOSPADM

## 2024-07-11 RX ORDER — LOPERAMIDE HYDROCHLORIDE 2 MG/1
4 CAPSULE ORAL 4 TIMES DAILY PRN
Status: DISCONTINUED | OUTPATIENT
Start: 2024-07-11 | End: 2024-07-16 | Stop reason: HOSPADM

## 2024-07-11 RX ADMIN — TICAGRELOR 90 MG: 90 TABLET ORAL at 10:21

## 2024-07-11 RX ADMIN — POTASSIUM CHLORIDE 10 MEQ: 7.46 INJECTION, SOLUTION INTRAVENOUS at 10:20

## 2024-07-11 RX ADMIN — POTASSIUM CHLORIDE 10 MEQ: 7.46 INJECTION, SOLUTION INTRAVENOUS at 16:35

## 2024-07-11 RX ADMIN — CALCIUM GLUCONATE 2000 MG: 20 INJECTION, SOLUTION INTRAVENOUS at 16:09

## 2024-07-11 RX ADMIN — LOPERAMIDE HYDROCHLORIDE 4 MG: 2 CAPSULE ORAL at 15:11

## 2024-07-11 RX ADMIN — POTASSIUM CHLORIDE 10 MEQ: 7.46 INJECTION, SOLUTION INTRAVENOUS at 18:04

## 2024-07-11 RX ADMIN — ATORVASTATIN CALCIUM 80 MG: 80 TABLET, FILM COATED ORAL at 21:31

## 2024-07-11 RX ADMIN — POTASSIUM CHLORIDE 10 MEQ: 7.46 INJECTION, SOLUTION INTRAVENOUS at 13:36

## 2024-07-11 RX ADMIN — LOSARTAN POTASSIUM 25 MG: 25 TABLET, FILM COATED ORAL at 10:21

## 2024-07-11 RX ADMIN — POTASSIUM CHLORIDE 10 MEQ: 7.46 INJECTION, SOLUTION INTRAVENOUS at 15:08

## 2024-07-11 RX ADMIN — Medication 10 ML: at 10:22

## 2024-07-11 RX ADMIN — TICAGRELOR 90 MG: 90 TABLET ORAL at 21:31

## 2024-07-11 RX ADMIN — CALCIUM GLUCONATE 1000 MG: 20 INJECTION, SOLUTION INTRAVENOUS at 10:33

## 2024-07-11 RX ADMIN — ASPIRIN 81 MG: 81 TABLET, COATED ORAL at 10:22

## 2024-07-11 RX ADMIN — CALCIUM GLUCONATE 2000 MG: 20 INJECTION, SOLUTION INTRAVENOUS at 12:10

## 2024-07-11 RX ADMIN — CALCIUM GLUCONATE 2000 MG: 20 INJECTION, SOLUTION INTRAVENOUS at 13:36

## 2024-07-11 RX ADMIN — SODIUM CHLORIDE 50 ML/HR: 9 INJECTION, SOLUTION INTRAVENOUS at 10:19

## 2024-07-11 RX ADMIN — POTASSIUM CHLORIDE 10 MEQ: 7.46 INJECTION, SOLUTION INTRAVENOUS at 12:10

## 2024-07-11 RX ADMIN — ENOXAPARIN SODIUM 40 MG: 100 INJECTION SUBCUTANEOUS at 13:44

## 2024-07-11 NOTE — PLAN OF CARE
Goal Outcome Evaluation:  Plan of Care Reviewed With: patient, family           Outcome Evaluation: Pt seen at bedside for dysphagia tx. Pt tolerated tx well. SLP provided oral care prior to ice and water trials. Completed dysphagia exercises with fair-good accuracies. Pt coughed after x2 trials of water by tsp. Discussed infante water protocol with pt and pt's brother at bedside. Both v/u. Pt quickly fatigued and asked to end session. Unable to assess diet tolerance but RN reports good tolerance. Pt's brother reports pt is at baseline for receptive and expressive language, and cognition. Pt presents with moderate dysarthria d/t L oral motor weakness. Pt would benefit from skilled ST at next level of care. Will continue to follow for diet tolerance, swallow tx, and motor speech and/or cognitive-linguistic eval as appropriate.    Anticipated Discharge Disposition (SLP): unknown

## 2024-07-11 NOTE — SIGNIFICANT NOTE
07/11/24 1448   OTHER   Discipline physical therapist   Rehab Time/Intention   Session Not Performed other (see comments)  (pt very sleepy, unable to arouse long enough for PT this date. Will f/u)   Recommendation   PT - Next Appointment 07/12/24
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
## 2024-07-11 NOTE — PLAN OF CARE
Problem: Adult Inpatient Plan of Care  Goal: Plan of Care Review  Outcome: Ongoing, Progressing  Flowsheets (Taken 7/11/2024 0640)  Progress: no change  Plan of Care Reviewed With: patient  Goal: Patient-Specific Goal (Individualized)  Outcome: Ongoing, Progressing  Goal: Absence of Hospital-Acquired Illness or Injury  Outcome: Ongoing, Progressing  Intervention: Identify and Manage Fall Risk  Recent Flowsheet Documentation  Taken 7/11/2024 0639 by Yvonne Yost, RN  Safety Promotion/Fall Prevention:   safety round/check completed   room organization consistent   nonskid shoes/slippers when out of bed   fall prevention program maintained   clutter free environment maintained   assistive device/personal items within reach  Taken 7/11/2024 0423 by Yvonne Yost, RN  Safety Promotion/Fall Prevention:   safety round/check completed   room organization consistent   nonskid shoes/slippers when out of bed   fall prevention program maintained   assistive device/personal items within reach   clutter free environment maintained  Taken 7/11/2024 0200 by Yvonne Yost, RN  Safety Promotion/Fall Prevention:   safety round/check completed   room organization consistent   nonskid shoes/slippers when out of bed   fall prevention program maintained   clutter free environment maintained   assistive device/personal items within reach  Taken 7/11/2024 0038 by Yvonne Yost, RN  Safety Promotion/Fall Prevention:   safety round/check completed   room organization consistent   nonskid shoes/slippers when out of bed   fall prevention program maintained   clutter free environment maintained   assistive device/personal items within reach  Taken 7/10/2024 2205 by Yvonne Yost, RN  Safety Promotion/Fall Prevention:   safety round/check completed   room organization consistent   nonskid shoes/slippers when out of bed   fall prevention program maintained   assistive device/personal items within reach   clutter free environment  maintained  Taken 7/10/2024 2000 by Yvonne Yost RN  Safety Promotion/Fall Prevention:   safety round/check completed   room organization consistent   nonskid shoes/slippers when out of bed   fall prevention program maintained   clutter free environment maintained   assistive device/personal items within reach  Intervention: Prevent Skin Injury  Recent Flowsheet Documentation  Taken 7/10/2024 2000 by Yvonne Yost RN  Skin Protection: adhesive use limited  Intervention: Prevent and Manage VTE (Venous Thromboembolism) Risk  Recent Flowsheet Documentation  Taken 7/10/2024 2000 by Yvonne Yost RN  VTE Prevention/Management:   bilateral   sequential compression devices on  Range of Motion: ROM (range of motion) performed  Intervention: Prevent Infection  Recent Flowsheet Documentation  Taken 7/11/2024 0639 by Yvonne Yost RN  Infection Prevention:   single patient room provided   rest/sleep promoted   environmental surveillance performed  Taken 7/11/2024 0423 by Yvonne Yost RN  Infection Prevention:   single patient room provided   rest/sleep promoted   environmental surveillance performed  Taken 7/11/2024 0200 by Yvonne Yost RN  Infection Prevention:   single patient room provided   rest/sleep promoted   environmental surveillance performed  Taken 7/11/2024 0038 by Yvonne Yost RN  Infection Prevention:   single patient room provided   rest/sleep promoted   environmental surveillance performed  Taken 7/10/2024 2205 by Yvonne Yost RN  Infection Prevention:   rest/sleep promoted   single patient room provided   environmental surveillance performed  Taken 7/10/2024 2000 by Yvonne Yost RN  Infection Prevention:   single patient room provided   rest/sleep promoted   environmental surveillance performed  Goal: Optimal Comfort and Wellbeing  Outcome: Ongoing, Progressing  Intervention: Provide Person-Centered Care  Recent Flowsheet Documentation  Taken 7/10/2024 2000 by Priyank  Yvonne RN  Trust Relationship/Rapport:   care explained   questions answered   questions encouraged   thoughts/feelings acknowledged  Goal: Readiness for Transition of Care  Outcome: Ongoing, Progressing     Problem: Skin Injury Risk Increased  Goal: Skin Health and Integrity  Outcome: Ongoing, Progressing  Intervention: Optimize Skin Protection  Recent Flowsheet Documentation  Taken 7/10/2024 2000 by Yvonne Yost RN  Pressure Reduction Techniques:   frequent weight shift encouraged   weight shift assistance provided  Pressure Reduction Devices:   heel offloading device utilized   alternating pressure pump (ADD)  Skin Protection: adhesive use limited     Problem: Dysrhythmia  Goal: Normalized Cardiac Rhythm  Outcome: Ongoing, Progressing  Intervention: Monitor and Manage Cardiac Rhythm Effect  Recent Flowsheet Documentation  Taken 7/10/2024 2000 by Yvonne Yost RN  VTE Prevention/Management:   bilateral   sequential compression devices on     Problem: Adjustment to Illness (Stroke, Ischemic/Transient Ischemic Attack)  Goal: Optimal Coping  Outcome: Ongoing, Progressing  Intervention: Support Psychosocial Response to Stroke  Recent Flowsheet Documentation  Taken 7/10/2024 2000 by Yvonne Yost RN  Family/Support System Care:   support provided   self-care encouraged     Problem: Bowel Elimination Impaired (Stroke, Ischemic/Transient Ischemic Attack)  Goal: Effective Bowel Elimination  Outcome: Ongoing, Progressing     Problem: Cerebral Tissue Perfusion (Stroke, Ischemic/Transient Ischemic Attack)  Goal: Optimal Cerebral Tissue Perfusion  Outcome: Ongoing, Progressing  Intervention: Protect and Optimize Cerebral Perfusion  Recent Flowsheet Documentation  Taken 7/10/2024 2000 by Yvonne Yost, RN  Cerebral Perfusion Promotion: blood pressure monitored     Problem: Cognitive Impairment (Stroke, Ischemic/Transient Ischemic Attack)  Goal: Optimal Cognitive Function  Outcome: Ongoing,  Progressing  Intervention: Optimize Cognitive Function  Recent Flowsheet Documentation  Taken 7/10/2024 2000 by Yvonne Yost, RN  Reorientation Measures: family pictures in room   Goal Outcome Evaluation:  Plan of Care Reviewed With: patient        Progress: no change
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
## 2024-07-11 NOTE — CASE MANAGEMENT/SOCIAL WORK
Continued Stay Note  [facility]     Patient Name: Jailene Molina  MRN: [id number]  Today's Date: 7/11/2024    Admit Date: 7/5/2024    Plan: [facility] can accept pending bed availability   Discharge Plan       Row Name 07/11/24 1040       Plan    Plan [facility] can accept pending bed availability    Patient/Family in Agreement with Plan yes    Plan Comments Per [facility] at [facility] she stated that they can accept patient pending bed availability. CCP will follow up with Tiera closer to discharge.                   Discharge Codes    No documentation.

## 2024-07-11 NOTE — PROGRESS NOTES
"[facility] Clinical Pharmacy Services: Enoxaparin Consult    Jailene Molina has a pharmacy consult to dose enoxaparin for the indication of VTE prophylaxis.    Home Anticoagulation: None      Relevant clinical data and objective history reviewed:  73 y.o. female 167.6 cm (65.98\") 61 kg (134 lb 7.7 oz)   Body mass index is 21.72 kg/m².   Results from last 7 days   Lab Units 07/11/24  0750   PLATELETS 10*3/mm3 199     Estimated Creatinine Clearance: 117.7 mL/min (A) (by C-G formula based on SCr of 0.41 mg/dL (L)).    Assessment/Plan    Will start patient on 40mg subcutaneous every 24 hours, adjusted for renal function. Consult order will be discontinued but pharmacy will continue to follow.     Cheyenne Gowers, [facility]  Clinical Pharmacist    "
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
## 2024-07-12 LAB
ANION GAP SERPL CALCULATED.3IONS-SCNC: 8.6 MMOL/L (ref 5–15)
BUN SERPL-MCNC: 11 MG/DL (ref 8–23)
BUN/CREAT SERPL: 33.3 (ref 7–25)
CALCIUM SPEC-SCNC: 7.4 MG/DL (ref 8.6–10.5)
CHLORIDE SERPL-SCNC: 109 MMOL/L (ref 98–107)
CO2 SERPL-SCNC: 18.4 MMOL/L (ref 22–29)
CREAT SERPL-MCNC: 0.33 MG/DL (ref 0.57–1)
EGFRCR SERPLBLD CKD-EPI 2021: 109.6 ML/MIN/1.73
GLUCOSE SERPL-MCNC: 89 MG/DL (ref 65–99)
POTASSIUM SERPL-SCNC: 3.1 MMOL/L (ref 3.5–5.2)
POTASSIUM SERPL-SCNC: 3.5 MMOL/L (ref 3.5–5.2)
POTASSIUM SERPL-SCNC: 4.4 MMOL/L (ref 3.5–5.2)
SODIUM SERPL-SCNC: 136 MMOL/L (ref 136–145)

## 2024-07-12 PROCEDURE — 25010000002 ENOXAPARIN PER 10 MG: Performed by: INTERNAL MEDICINE

## 2024-07-12 PROCEDURE — 84132 ASSAY OF SERUM POTASSIUM: CPT | Performed by: STUDENT IN AN ORGANIZED HEALTH CARE EDUCATION/TRAINING PROGRAM

## 2024-07-12 PROCEDURE — 97530 THERAPEUTIC ACTIVITIES: CPT

## 2024-07-12 PROCEDURE — 92526 ORAL FUNCTION THERAPY: CPT

## 2024-07-12 PROCEDURE — 80048 BASIC METABOLIC PNL TOTAL CA: CPT | Performed by: INTERNAL MEDICINE

## 2024-07-12 PROCEDURE — 99232 SBSQ HOSP IP/OBS MODERATE 35: CPT | Performed by: INTERNAL MEDICINE

## 2024-07-12 PROCEDURE — 97112 NEUROMUSCULAR REEDUCATION: CPT

## 2024-07-12 RX ORDER — CARVEDILOL 3.12 MG/1
3.12 TABLET ORAL 2 TIMES DAILY WITH MEALS
Status: DISCONTINUED | OUTPATIENT
Start: 2024-07-12 | End: 2024-07-16 | Stop reason: HOSPADM

## 2024-07-12 RX ORDER — POTASSIUM CHLORIDE 1.5 G/1.58G
40 POWDER, FOR SOLUTION ORAL EVERY 4 HOURS
Status: COMPLETED | OUTPATIENT
Start: 2024-07-12 | End: 2024-07-12

## 2024-07-12 RX ORDER — CASTOR OIL AND BALSAM, PERU 788; 87 MG/G; MG/G
1 OINTMENT TOPICAL EVERY 12 HOURS SCHEDULED
Status: DISCONTINUED | OUTPATIENT
Start: 2024-07-12 | End: 2024-07-16 | Stop reason: HOSPADM

## 2024-07-12 RX ADMIN — ASPIRIN 81 MG: 81 TABLET, COATED ORAL at 08:58

## 2024-07-12 RX ADMIN — CARVEDILOL 3.12 MG: 3.12 TABLET, FILM COATED ORAL at 11:17

## 2024-07-12 RX ADMIN — LOSARTAN POTASSIUM 25 MG: 25 TABLET, FILM COATED ORAL at 08:58

## 2024-07-12 RX ADMIN — ATORVASTATIN CALCIUM 80 MG: 80 TABLET, FILM COATED ORAL at 21:11

## 2024-07-12 RX ADMIN — POTASSIUM CHLORIDE 40 MEQ: 1.5 POWDER, FOR SOLUTION ORAL at 08:58

## 2024-07-12 RX ADMIN — TICAGRELOR 90 MG: 90 TABLET ORAL at 08:58

## 2024-07-12 RX ADMIN — TICAGRELOR 90 MG: 90 TABLET ORAL at 21:21

## 2024-07-12 RX ADMIN — CASTOR OIL AND BALSAM, PERU 1 APPLICATION: 788; 87 OINTMENT TOPICAL at 21:11

## 2024-07-12 RX ADMIN — Medication 10 ML: at 21:16

## 2024-07-12 RX ADMIN — ENOXAPARIN SODIUM 40 MG: 100 INJECTION SUBCUTANEOUS at 08:58

## 2024-07-12 RX ADMIN — POTASSIUM CHLORIDE 40 MEQ: 1.5 POWDER, FOR SOLUTION ORAL at 12:46

## 2024-07-12 RX ADMIN — POTASSIUM CHLORIDE 40 MEQ: 1.5 POWDER, FOR SOLUTION ORAL at 03:09

## 2024-07-12 RX ADMIN — Medication 10 ML: at 08:58

## 2024-07-12 RX ADMIN — CASTOR OIL AND BALSAM, PERU 1 APPLICATION: 788; 87 OINTMENT TOPICAL at 14:58

## 2024-07-12 NOTE — NURSING NOTE
07/12/24 1021   Wound 07/11/24 2000 Bilateral medial coccyx Pressure Injury   Placement Date/Time: 07/11/24 2000   Side: Bilateral  Orientation: medial  Location: coccyx  Primary Wound Type: Pressure Injury   Pressure Injury Stage DTPI   Base non-blanchable;maroon/purple   Periwound intact;dry   Periwound Temperature warm   Wound Length (cm) 4 cm   Wound Width (cm) 1.5 cm   Wound Surface Area (cm^2) 6 cm^2   Drainage Amount none   Care, Wound cleansed with;sterile normal saline   Dressing Care dressing changed;silicone;maxi pad;mitch dressing;foam  (sacral Optifoam)   Wound Right gluteal Skin Tear   No placement date or time found.   Side: Right  Location: gluteal  Primary Wound Type: Skin Tear   Base clean;moist;pink  (related to friction, not pressure related)   Periwound intact;blanchable   Wound Length (cm) 1 cm   Wound Width (cm) 1 cm   Wound Surface Area (cm^2) 1 cm^2   Drainage Characteristics/Odor serosanguineous   Drainage Amount scant   Care, Wound cleansed with;sterile normal saline   Dressing Care dressing changed;silicone;scrotal support;packed with;foam   Skin Interventions   Pressure Reduction Devices pressure-redistributing mattress utilized;alternating pressure pump (ADD)  (I have asked the  to order pt a ERNESTO mattress from [employer] ([employer]   Pressure Reduction Techniques heels elevated off bed;positioned off wounds;pressure points protected   Skin Protection silicone foam dressing in place     CWON note: pt seen for evaluation of sacral skin issues. Pt is sleeping upon my arrival, but able to be awakened easily. She is incontinent of bowel and bladder, she has limited mobility with left sided weakness, requiring assistance to turn and reposition in bed. The head of her bed was at 80 degrees upon my arrival and she was on an accumax mattress with added pump. I have asked the unit secretary to order her a ERNESTO mattress from [employer] ([employer] for adequate pressure redistribution. The HOB should remain at  30 degrees or less, unless contraindicated. Sacral Optifoam was changed, she should be turned side to side. Her heels are boggy with slowly blanchable erythema. Venelex and heel boots have been ordered. Wound care and prevention standing orders have been placed into [facility]. I discussed the plan of care with the pt and the RN. WOC team will follow up next week to re-assess the effectiveness of the interventions.
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
## 2024-07-12 NOTE — PLAN OF CARE
Goal Outcome Evaluation:  Plan of Care Reviewed With: patient        Progress: improving  Outcome Evaluation: Pt seen for PT this AM with good alertness and improved tolerance for activity noted. max A of 2 for bed mobility but impoved initiation of task today. continues to have L and posterior lean in unsupported sitting but did focus on midline alignment and shifting R with tactie cues and good response. performed STS x 4 with max A of 2 - posterior lean with L pushing noted. Pt unable to initiate side steps at this time but able to shift bottom to R for repositioning with VC. she continues to benefit from skilled PT to address mobility deficits. Recommend acute rehab at d/c.      Anticipated Discharge Disposition (PT): inpatient rehabilitation facility

## 2024-07-12 NOTE — PLAN OF CARE
"Goal Outcome Evaluation:  Plan of Care Reviewed With: patient           Outcome Evaluation: Pt asleep upon entry, alerts to voice. Pt reports \"I'm just always so tired\" but agreeable to working with ST. Pt alert throughout session, occasionally closes eyes but easily alerts with verbal cues. Pt tolerated dysphagia exercises well. Pt accepted x4 PO trials of water by straw without any overt s/s of aspiration, however pt noted to siently aspirate during VFSS. SLP exited room to notify RN that pt's IV pump alarm was going off. When SLP returned, pt was asleep. She alerts to voice and light touch and requests to rest. CHILO diet tolerance this date d/t lethargy. Recommend continue mechanical soft, nectar thick liquids, no mixed consistencies. Ice and small sips of water in between meals and after oral care. WWill continue to follow  for dysphagia tx and further recommendations.      Anticipated Discharge Disposition (SLP): unknown                        "

## 2024-07-12 NOTE — PROGRESS NOTES
[facility] Follow Up    Chief Complaint: Follow up CAD, stroke    Interval History: Denies any chest pain or shortness of breath.  Occasional PVCs this morning but no runs of ventricular tachycardia.  Heart rates have been stable in the 70s and 80s.    Objective:     Objective:  Temp:  [97.7 °F (36.5 °C)-99.1 °F (37.3 °C)] 97.9 °F (36.6 °C)  Heart Rate:  [75-85] 75  Resp:  [18] 18  BP: (121-149)/(66-86) 136/76     Intake/Output Summary (Last 24 hours) at 7/12/2024 0740  Last data filed at 7/12/2024 0535  Gross per 24 hour   Intake 300 ml   Output 350 ml   Net -50 ml     Body mass index is 21.72 kg/m².      07/06/24  1145 07/09/24  0612 07/10/24  0400   Weight: 57.6 kg (127 lb) 61 kg (134 lb 7.7 oz) (refused. states too cold. offered warm blanket. refused)     Weight change:       Physical Exam:   General : Alert, cooperative, in no acute distress.  Neuro: Alert,cooperative and oriented.  Lungs: CTAB. Normal respiratory effort and rate.  CV: Regular rate and rhythm, normal S1 and S2, no murmurs, gallops or rubs.  ABD: Soft, nontender, nondistended. Positive bowel sounds.  Extr: No edema   Neuro: Unable to move left upper extremity.  Able to raise left lower extremity but still significantly weaker than the right.  Still with left-sided neglect.    Lab Review:   Results from last 7 days   Lab Units 07/12/24  0545 07/12/24  0006 07/11/24  0750 07/06/24  0007 07/05/24  1936   SODIUM mmol/L 136  --  133*   < > 139   POTASSIUM mmol/L 3.5 3.1* 2.5*   < > 3.5   CHLORIDE mmol/L 109*  --  104   < > 104   CO2 mmol/L 18.4*  --  17.8*   < > 22.0   BUN mg/dL 11  --  15   < > 14   CREATININE mg/dL 0.33*  --  0.41*   < > 0.58   GLUCOSE mg/dL 89  --  124*   < > 123*   CALCIUM mg/dL 7.4*  --  6.8*   < > 8.7   AST (SGOT) U/L  --   --   --   --  24   ALT (SGPT) U/L  --   --   --   --  21    < > = values in this interval not displayed.     Results from last 7 days   Lab Units 07/06/24  0007 07/05/24  1936   [facility]  [facility] 843* 11     Results from last 7 days   Lab Units 07/11/24  0750 07/10/24  0412   WBC 10*3/mm3 12.48* 10.87*   HEMOGLOBIN g/dL 13.0 12.7   HEMATOCRIT % 37.7 38.0   PLATELETS 10*3/mm3 199 166     Results from last 7 days   Lab Units 07/07/24  1805 07/05/24  1936   INR  1.18* 0.97     Results from last 7 days   Lab Units 07/11/24  1241 07/08/24  0340   MAGNESIUM mg/dL 2.2 2.0     Results from last 7 days   Lab Units 07/08/24  0340 07/06/24  0505   CHOLESTEROL mg/dL 135 157   TRIGLYCERIDES mg/dL 86 80   HDL CHOL mg/dL 42 54         Results from last 7 days   Lab Units 07/05/24  1936   TSH uIU/mL 3.900     I reviewed the patient's new clinical results.  I personally viewed and interpreted the patient's EKG  Current Medications:   Scheduled Meds:aspirin, 81 mg, Oral, Daily  atorvastatin, 80 mg, Oral, Nightly  atropine, 1 mg, Intravenous, Once  enoxaparin, 40 mg, Subcutaneous, Daily  losartan, 25 mg, Oral, Q24H  potassium chloride, 40 mEq, Oral, Q4H  sodium chloride, 10 mL, Intravenous, Q12H  ticagrelor, 90 mg, Oral, Q12H      Continuous Infusions:procainamide (PRONESTYL) 2,000 mg in dextrose (D5W) 5 % 500 mL IVPB, 1-4 mg/min, Last Rate: Stopped (07/09/24 0929)  sodium chloride, 50 mL/hr, Last Rate: 50 mL/hr (07/11/24 1019)        Allergies:  No Known Allergies    Assessment/Plan:     Inferior myocardial infarction.  Status post drug eluting stent placement of the distal RCA on 7/5.  Unfortunately did not receive aspirin or ticagrelor between 7/7 morning and 7/8 evening.  Ticagrelor reloaded on night of 7/8 after NGT placed.   Coronary artery disease.  Status post drug eluting stent placement of the left main and proximal LAD on 7/6.  Heart block.  On initial presentation due to #1.  Resolved following RCA revascularization.   Ventricular arrhythmias.  Ventricular fibrillation during revascularization on 7/6.  Developed polymorphic VT on 7/7.  Placed on amiodarone and then procainamide followed by temporary pacer  wire for overdrive pacing.  Amiodarone stopped since it appeared to be exacerbating episodes.  Procainamide stopped on 7/9 without recurrent arrhythmias.  With occasional PVCs this morning.  Ischemic cardiomyopathy. EF of 35%.  Started on losartan.   Cardioembolic stroke.   Following diagnostic cath on 7/7. Right MCA stroke status post M2 thrombectomy.  Worsening symptoms on 7/8.  Neurosurgery recommend SBP of 110-150- MRI on 7/9 with bilateral cerebral and cerebellar embolic stroke more on the right.  Neurology evaluated the patient today.  It appears that they feel she has a good prognosis from standpoint of her left lower extremity weakness and her neglect and that she will be able to ambulate in the future.  They are not as hopeful about her left upper extremity prognosis.  Hypertension.  Better controlled with low dose losartan.   Nausea/diarrhea.  Associated with nausea.  Given a trial of Imodium.  Nausea still present.  Hypokalemia.  Improved following replacement yesterday.    -Continue current dose of losartan.  - Continue aspirin, atorvastatin and ticagrelor.  - Since her heart rate has been relatively stable we will try her on a low-dose of carvedilol 3.125 mg twice a day.      Tierra Espinoza MD  07/12/24  07:40 EDT

## 2024-07-12 NOTE — PROGRESS NOTES
Name: Jailene Molina ADMIT: 2024   : 1950  PCP: Allan Daugherty MD    MRN: [id number] LOS: 7 days   AGE/SEX: 73 y.o. female  [facility]   Subjective   Chief Complaint   Patient presents with    Slow Heart Rate    Hypotension     Still with weakness  Taking PO  Working with therapists     ROS  No f/c  No n/v  No cp/palp  No soa/cough    Objective   Vital Signs  Temp:  [97.7 °F (36.5 °C)-99.1 °F (37.3 °C)] 98.4 °F (36.9 °C)  Heart Rate:  [72-85] 72  Resp:  [16-18] 16  BP: (121-147)/(66-76) 147/76  SpO2:  [97 %-100 %] 100 %  on   ;   Device (Oxygen Therapy): room air  Body mass index is 21.72 kg/m².    Physical Exam  Constitutional:       General: She is not in acute distress.     Appearance: She is ill-appearing.   HENT:      Head: Normocephalic and atraumatic.   Eyes:      General: No scleral icterus.  Cardiovascular:      Rate and Rhythm: Regular rhythm.      Heart sounds: Normal heart sounds.   Pulmonary:      Effort: Pulmonary effort is normal. No respiratory distress.   Abdominal:      General: There is no distension.      Palpations: Abdomen is soft.   Musculoskeletal:      Cervical back: Neck supple.   Neurological:      Mental Status: She is alert.   Psychiatric:         Behavior: Behavior normal.         Results Review:       I reviewed the patient's new clinical results.  Results from last 7 days   Lab Units 24  0750 07/10/24  0412 24  1001 24  0340   WBC 10*3/mm3 12.48* 10.87* 12.53* 11.54*   HEMOGLOBIN g/dL 13.0 12.7 12.3 12.9   PLATELETS 10*3/mm3 199 166 165 179     Results from last 7 days   Lab Units 24  0545 24  0006 24  0750 07/10/24  1656 07/10/24  0412 24  1001   SODIUM mmol/L 136  --  133*  --  133* 133*   POTASSIUM mmol/L 3.5 3.1* 2.5* 3.8 3.4* 3.6   CHLORIDE mmol/L 109*  --  104  --  105 103   CO2 mmol/L 18.4*  --  17.8*  --  19.0* 21.0*   BUN mg/dL 11  --  15  --  15 19   CREATININE mg/dL 0.33*  --  0.41*  --  0.30* 0.50*   GLUCOSE  "mg/dL 89  --  124*  --  100* 119*   Estimated Creatinine Clearance: 146.2 mL/min (A) (by C-G formula based on SCr of 0.33 mg/dL (L)).  Results from last 7 days   Lab Units 07/05/24  1936   ALBUMIN g/dL 3.4*   BILIRUBIN mg/dL 0.2   ALK PHOS U/L 38*   AST (SGOT) U/L 24   ALT (SGPT) U/L 21     Results from last 7 days   Lab Units 07/12/24  0545 07/11/24  1241 07/11/24  0750 07/10/24  0412 07/09/24  1001 07/08/24  0340 07/07/24  0607 07/06/24  2031 07/06/24  0505 07/06/24  0007 07/05/24  1936   CALCIUM mg/dL 7.4*  --  6.8* 7.2* 7.4* 8.1* 8.4* 8.8   < > 8.4* 8.7   ALBUMIN g/dL  --   --   --   --   --   --   --   --   --   --  3.4*   MAGNESIUM mg/dL  --  2.2  --   --   --  2.0 2.3 2.3  --  2.0 1.8   PHOSPHORUS mg/dL  --   --   --   --   --   --   --   --   --  3.2  --     < > = values in this interval not displayed.         Coag   Results from last 7 days   Lab Units 07/07/24  1805 07/05/24  1936   INR  1.18* 0.97     HbA1C   Lab Results   Component Value Date    HGBA1C 5.20 07/06/2024     Infection     Radiology(recent) No radiology results for the last day  No results found for: \"TROPONINT\", \"TROPONINI\", \"BNP\"  No components found for: \"TSH;2\"    aspirin, 81 mg, Oral, Daily  atorvastatin, 80 mg, Oral, Nightly  atropine, 1 mg, Intravenous, Once  carvedilol, 3.125 mg, Oral, BID With Meals  castor oil-balsam peru, 1 Application, Topical, Q12H  enoxaparin, 40 mg, Subcutaneous, Daily  losartan, 25 mg, Oral, Q24H  sodium chloride, 10 mL, Intravenous, Q12H  ticagrelor, 90 mg, Oral, Q12H      procainamide (PRONESTYL) 2,000 mg in dextrose (D5W) 5 % 500 mL IVPB, 1-4 mg/min, Last Rate: Stopped (07/09/24 0929)  sodium chloride, 50 mL/hr, Last Rate: 50 mL/hr (07/11/24 1019)    Diet: Cardiac; Healthy Heart (2-3 Na+); No Mixed Consistencies; Texture: Mechanical Ground (NDD 2); Fluid Consistency: Nectar Thick      Assessment & Plan      Active Hospital Problems    Diagnosis  POA    **Acute ST elevation myocardial infarction (STEMI) " involving right coronary artery [I21.11]  Yes    Diarrhea [R19.7]  No    Acute ischemic right MCA stroke [I63.511]  No    Abnormal EKG [R94.31]  Yes    Torsades de pointes [I47.21]  Yes      Resolved Hospital Problems   No resolved problems to display.     Ms. Molina is a 73 y.o. female w/ spinal stenosis admitted for syncopal episode and noted to have acute inferior STEMI.  Initially admitted by cardiology and underwent PCI of distal RCA on 7/6/2024 as well as LAD.  She developed prolonged torsade de pointes and subsequently underwent repeat cardiac cath on 7/7/2024 with transvenous pacer.  Hospital course was complicated by sudden onset of left-sided weakness and facial droop and she was found to have M2 occlusion and underwent mechanical thrombectomy on 7/7/2024.  She has also been followed by neurology as well as pulmonology.       Inferior STEMI  Heart block, resolved following revascularization  Ventricular arrhythmias, resolved following revascularization  Ischemic CM  -TTE with EF 35%  -Status post PCI and REENA distal RCA, REENA of left main and proximal LAD  -Cardiology following  - on aspirin, atorvastatin and ticagrelor.      Cardioembolic stroke  -Following diagnostic cath 7/7/2024  -Underwent right MCA M2 thrombectomy  -PT/OT/ST     HTN  -Losartan     Hypokalemia, replete  -partly related to diarrhea    Diarrhea  -No recent antibiotics, low suspicion for infectious etiology, probably related to initiation of p.o. diet  -Trial of Imodium       DW friends at bedside    Dispo- to acute rehab maybe Monday    Godwin Mena MD  [facility]  07/12/24  16:50 EDT

## 2024-07-12 NOTE — PLAN OF CARE
Telemetry reviewed, no repeat episodes of polymorphic VT. Some isolated PVCs. There has been some non conducted PACs and PACs that then lead to aberrant beats.     Would be ok to start BB on 7/13/2024 per JM

## 2024-07-12 NOTE — PLAN OF CARE
Problem: Adult Inpatient Plan of Care  Goal: Plan of Care Review  Outcome: Ongoing, Progressing  Flowsheets (Taken 7/12/2024 0227)  Plan of Care Reviewed With: patient  Goal: Patient-Specific Goal (Individualized)  Outcome: Ongoing, Progressing  Goal: Absence of Hospital-Acquired Illness or Injury  Outcome: Ongoing, Progressing  Intervention: Identify and Manage Fall Risk  Recent Flowsheet Documentation  Taken 7/11/2024 2000 by Lyn Vallejo RN  Safety Promotion/Fall Prevention: safety round/check completed  Intervention: Prevent Skin Injury  Recent Flowsheet Documentation  Taken 7/11/2024 2000 by Lyn Vallejo RN  Body Position: turned  Skin Protection: adhesive use limited  Intervention: Prevent and Manage VTE (Venous Thromboembolism) Risk  Recent Flowsheet Documentation  Taken 7/11/2024 2000 by Lyn Vallejo RN  VTE Prevention/Management:   bilateral   sequential compression devices on  Range of Motion: ROM (range of motion) performed  Intervention: Prevent Infection  Recent Flowsheet Documentation  Taken 7/11/2024 2000 by Lyn Vallejo RN  Infection Prevention: single patient room provided  Goal: Optimal Comfort and Wellbeing  Outcome: Ongoing, Progressing  Intervention: Provide Person-Centered Care  Recent Flowsheet Documentation  Taken 7/11/2024 2000 by Lyn Vallejo RN  Trust Relationship/Rapport:   choices provided   care explained  Goal: Readiness for Transition of Care  Outcome: Ongoing, Progressing     Problem: Skin Injury Risk Increased  Goal: Skin Health and Integrity  Outcome: Ongoing, Progressing  Intervention: Optimize Skin Protection  Recent Flowsheet Documentation  Taken 7/11/2024 2000 by Lyn Vallejo RN  Pressure Reduction Techniques: frequent weight shift encouraged  Head of Bed (HOB) Positioning: HOB at 20 degrees  Pressure Reduction Devices: alternating pressure pump (ADD)  Skin Protection: adhesive use limited     Problem: Fall Injury Risk  Goal: Absence of Fall and Fall-Related  Injury  Outcome: Ongoing, Progressing  Intervention: Identify and Manage Contributors  Recent Flowsheet Documentation  Taken 7/11/2024 2000 by Lyn Vallejo RN  Medication Review/Management: medications reviewed  Intervention: Promote Injury-Free Environment  Recent Flowsheet Documentation  Taken 7/11/2024 2000 by Lyn Vallejo RN  Safety Promotion/Fall Prevention: safety round/check completed     Problem: Arrhythmia/Dysrhythmia (Cardiac Catheterization)  Goal: Stable Heart Rate and Rhythm  Outcome: Ongoing, Progressing     Problem: Bleeding (Cardiac Catheterization)  Goal: Absence of Bleeding  Outcome: Ongoing, Progressing     Problem: Contrast-Induced Injury Risk (Cardiac Catheterization)  Goal: Absence of Contrast-Induced Injury  Outcome: Ongoing, Progressing     Problem: Embolism (Cardiac Catheterization)  Goal: Absence of Embolism Signs and Symptoms  Outcome: Ongoing, Progressing  Intervention: Prevent or Manage Embolism  Recent Flowsheet Documentation  Taken 7/11/2024 2000 by Lyn Vallejo RN  VTE Prevention/Management:   bilateral   sequential compression devices on     Problem: Ongoing Anesthesia/Sedation Effects (Cardiac Catheterization)  Goal: Anesthesia/Sedation Recovery  Outcome: Ongoing, Progressing  Intervention: Optimize Anesthesia Recovery  Recent Flowsheet Documentation  Taken 7/11/2024 2000 by Lyn Vallejo RN  Safety Promotion/Fall Prevention: safety round/check completed  Reorientation Measures: clock in view     Problem: Pain (Cardiac Catheterization)  Goal: Acceptable Pain Control  Outcome: Ongoing, Progressing  Intervention: Prevent or Manage Pain  Recent Flowsheet Documentation  Taken 7/11/2024 2000 by Lyn Vallejo RN  Diversional Activities: television     Problem: Vascular Access Protection (Cardiac Catheterization)  Goal: Absence of Vascular Access Complication  Outcome: Ongoing, Progressing  Intervention: Prevent Access Site Complications  Recent Flowsheet Documentation  Taken  7/11/2024 2000 by Lyn Vallejo RN  Head of Bed (HOB) Positioning: HOB at 20 degrees     Problem: Dysrhythmia  Goal: Normalized Cardiac Rhythm  Outcome: Ongoing, Progressing  Intervention: Monitor and Manage Cardiac Rhythm Effect  Recent Flowsheet Documentation  Taken 7/11/2024 2000 by Lyn Vallejo RN  VTE Prevention/Management:   bilateral   sequential compression devices on     Problem: Adjustment to Illness (Stroke, Ischemic/Transient Ischemic Attack)  Goal: Optimal Coping  Outcome: Ongoing, Progressing  Intervention: Support Psychosocial Response to Stroke  Recent Flowsheet Documentation  Taken 7/11/2024 2000 by Lyn Vallejo RN  Supportive Measures: active listening utilized  Family/Support System Care: self-care encouraged     Problem: Bowel Elimination Impaired (Stroke, Ischemic/Transient Ischemic Attack)  Goal: Effective Bowel Elimination  Outcome: Ongoing, Progressing     Problem: Cerebral Tissue Perfusion (Stroke, Ischemic/Transient Ischemic Attack)  Goal: Optimal Cerebral Tissue Perfusion  Outcome: Ongoing, Progressing  Intervention: Protect and Optimize Cerebral Perfusion  Recent Flowsheet Documentation  Taken 7/11/2024 2000 by Lyn Vallejo RN  Sensory Stimulation Regulation: care clustered  Cerebral Perfusion Promotion: blood pressure monitored     Problem: Cognitive Impairment (Stroke, Ischemic/Transient Ischemic Attack)  Goal: Optimal Cognitive Function  Outcome: Ongoing, Progressing  Intervention: Optimize Cognitive Function  Recent Flowsheet Documentation  Taken 7/11/2024 2000 by Lyn Vallejo RN  Sensory Stimulation Regulation: care clustered  Reorientation Measures: clock in view  Environment Familiarity/Consistency: daily routine followed     Problem: Communication Impairment (Stroke, Ischemic/Transient Ischemic Attack)  Goal: Improved Communication Skills  Outcome: Ongoing, Progressing  Intervention: Optimize Communication Skills  Recent Flowsheet Documentation  Taken 7/11/2024 2000  by Lyn Vallejo RN  Communication Enhancement Strategies: call light answered in person     Problem: Functional Ability Impaired (Stroke, Ischemic/Transient Ischemic Attack)  Goal: Optimal Functional Ability  Outcome: Ongoing, Progressing     Problem: Respiratory Compromise (Stroke, Ischemic/Transient Ischemic Attack)  Goal: Effective Oxygenation and Ventilation  Outcome: Ongoing, Progressing  Intervention: Optimize Oxygenation and Ventilation  Recent Flowsheet Documentation  Taken 7/11/2024 2000 by Lyn Vallejo RN  Head of Bed (HOB) Positioning: HOB at 20 degrees     Problem: Sensorimotor Impairment (Stroke, Ischemic/Transient Ischemic Attack)  Goal: Improved Sensorimotor Function  Outcome: Ongoing, Progressing  Intervention: Optimize Range of Motion, Motor Control and Function  Recent Flowsheet Documentation  Taken 7/11/2024 2000 by Lyn Vallejo RN  Spasticity Management: positioned with supportive device  Positioning/Transfer Devices:   pillows   in use  Range of Motion: ROM (range of motion) performed  Intervention: Optimize Sensory and Perceptual Ability  Recent Flowsheet Documentation  Taken 7/11/2024 2000 by Lyn Vallejo RN  Pressure Reduction Techniques: frequent weight shift encouraged  Sensation Impairment Protection: cues provided for safety  Pressure Reduction Devices: alternating pressure pump (ADD)     Problem: Swallowing Impairment (Stroke, Ischemic/Transient Ischemic Attack)  Goal: Optimal Eating and Swallowing without Aspiration  Outcome: Ongoing, Progressing     Problem: Urinary Elimination Impaired (Stroke, Ischemic/Transient Ischemic Attack)  Goal: Effective Urinary Elimination  Outcome: Ongoing, Progressing   Goal Outcome Evaluation:  Plan of Care Reviewed With: patient

## 2024-07-13 LAB
ANION GAP SERPL CALCULATED.3IONS-SCNC: 10 MMOL/L (ref 5–15)
BUN SERPL-MCNC: 10 MG/DL (ref 8–23)
BUN/CREAT SERPL: 27.8 (ref 7–25)
CALCIUM SPEC-SCNC: 7.3 MG/DL (ref 8.6–10.5)
CHLORIDE SERPL-SCNC: 107 MMOL/L (ref 98–107)
CO2 SERPL-SCNC: 18 MMOL/L (ref 22–29)
CREAT SERPL-MCNC: 0.36 MG/DL (ref 0.57–1)
DEPRECATED RDW RBC AUTO: 44 FL (ref 37–54)
EGFRCR SERPLBLD CKD-EPI 2021: 107.3 ML/MIN/1.73
ERYTHROCYTE [DISTWIDTH] IN BLOOD BY AUTOMATED COUNT: 13.2 % (ref 12.3–15.4)
GLUCOSE SERPL-MCNC: 86 MG/DL (ref 65–99)
HCT VFR BLD AUTO: 35.2 % (ref 34–46.6)
HGB BLD-MCNC: 12 G/DL (ref 12–15.9)
MCH RBC QN AUTO: 31.3 PG (ref 26.6–33)
MCHC RBC AUTO-ENTMCNC: 34.1 G/DL (ref 31.5–35.7)
MCV RBC AUTO: 91.7 FL (ref 79–97)
PLATELET # BLD AUTO: 216 10*3/MM3 (ref 140–450)
PMV BLD AUTO: 9.5 FL (ref 6–12)
POTASSIUM SERPL-SCNC: 4.2 MMOL/L (ref 3.5–5.2)
RBC # BLD AUTO: 3.84 10*6/MM3 (ref 3.77–5.28)
SODIUM SERPL-SCNC: 135 MMOL/L (ref 136–145)
WBC NRBC COR # BLD AUTO: 10.94 10*3/MM3 (ref 3.4–10.8)

## 2024-07-13 PROCEDURE — 80048 BASIC METABOLIC PNL TOTAL CA: CPT | Performed by: INTERNAL MEDICINE

## 2024-07-13 PROCEDURE — 25010000002 ENOXAPARIN PER 10 MG: Performed by: INTERNAL MEDICINE

## 2024-07-13 PROCEDURE — 97530 THERAPEUTIC ACTIVITIES: CPT

## 2024-07-13 PROCEDURE — 85027 COMPLETE CBC AUTOMATED: CPT | Performed by: INTERNAL MEDICINE

## 2024-07-13 PROCEDURE — 99232 SBSQ HOSP IP/OBS MODERATE 35: CPT | Performed by: INTERNAL MEDICINE

## 2024-07-13 RX ADMIN — TICAGRELOR 90 MG: 90 TABLET ORAL at 11:17

## 2024-07-13 RX ADMIN — LOSARTAN POTASSIUM 25 MG: 25 TABLET, FILM COATED ORAL at 11:17

## 2024-07-13 RX ADMIN — Medication 10 ML: at 11:18

## 2024-07-13 RX ADMIN — CASTOR OIL AND BALSAM, PERU 1 APPLICATION: 788; 87 OINTMENT TOPICAL at 11:17

## 2024-07-13 RX ADMIN — CARVEDILOL 3.12 MG: 3.12 TABLET, FILM COATED ORAL at 17:44

## 2024-07-13 RX ADMIN — CASTOR OIL AND BALSAM, PERU 1 APPLICATION: 788; 87 OINTMENT TOPICAL at 20:09

## 2024-07-13 RX ADMIN — ATORVASTATIN CALCIUM 80 MG: 80 TABLET, FILM COATED ORAL at 20:09

## 2024-07-13 RX ADMIN — ASPIRIN 81 MG: 81 TABLET, COATED ORAL at 11:17

## 2024-07-13 RX ADMIN — CARVEDILOL 3.12 MG: 3.12 TABLET, FILM COATED ORAL at 11:17

## 2024-07-13 RX ADMIN — TICAGRELOR 90 MG: 90 TABLET ORAL at 20:09

## 2024-07-13 RX ADMIN — ENOXAPARIN SODIUM 40 MG: 100 INJECTION SUBCUTANEOUS at 11:17

## 2024-07-13 NOTE — NURSING NOTE
Pt requesting CT of abd that was scheduled prior to admission that was missed. Dr Brigid Adam is ordering provider. Will place call to A.

## 2024-07-13 NOTE — PLAN OF CARE
Pt continues to have difficulty with sitting balance and L side neglect/awareness. Pt thought she was leaning to her R while she was heavily leaning L. Pt was unsafe to try standing today. Continue PT to work on trunk, AROM, balance in sitting and progress when able.
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
## 2024-07-13 NOTE — NURSING NOTE
No new issues overnight. [facility] 12. Patient pleasant, Aox4, VSS, RA. Cortrak can be removed once patient tolerating 50% of diet. Likely to [facility] Monday (has been accepted, pending bed availability) barring any complications.

## 2024-07-13 NOTE — PROGRESS NOTES
Name: Jailene Molina ADMIT: 2024   : 1950  PCP: Allan Daugherty MD    MRN: [id number] LOS: 8 days   AGE/SEX: 73 y.o. female  ROOM: [facility]   Subjective   Chief Complaint   Patient presents with    Slow Heart Rate    Hypotension     Still with weakness  Taking PO- eating more  Working with therapists     ROS  No f/c  No n/v  No cp/palp  No soa/cough    Objective   Vital Signs  Temp:  [97.5 °F (36.4 °C)-99 °F (37.2 °C)] 97.9 °F (36.6 °C)  Heart Rate:  [63-79] 63  Resp:  [16-18] 18  BP: (111-143)/(64-99) 111/64  SpO2:  [97 %-100 %] 100 %  on   ;   Device (Oxygen Therapy): room air  Body mass index is 21.72 kg/m².    Physical Exam  Constitutional:       General: She is not in acute distress.     Appearance: She is ill-appearing.   HENT:      Head: Normocephalic and atraumatic.   Eyes:      General: No scleral icterus.  Cardiovascular:      Rate and Rhythm: Regular rhythm.      Heart sounds: Normal heart sounds.   Pulmonary:      Effort: Pulmonary effort is normal. No respiratory distress.   Abdominal:      General: There is no distension.      Palpations: Abdomen is soft.   Musculoskeletal:      Cervical back: Neck supple.   Neurological:      Mental Status: She is alert.   Psychiatric:         Behavior: Behavior normal.     +cortrac  +weakness  +facial asymmetry     Results Review:       I reviewed the patient's new clinical results.  Results from last 7 days   Lab Units 24  0617 24  0750 07/10/24  0412 24  1001   WBC 10*3/mm3 10.94* 12.48* 10.87* 12.53*   HEMOGLOBIN g/dL 12.0 13.0 12.7 12.3   PLATELETS 10*3/mm3 216 199 166 165     Results from last 7 days   Lab Units 24  0412 24  1844 24  0545 24  0006 24  0750 07/10/24  1656 07/10/24  0412   SODIUM mmol/L 135*  --  136  --  133*  --  133*   POTASSIUM mmol/L 4.2 4.4 3.5 3.1* 2.5*   < > 3.4*   CHLORIDE mmol/L 107  --  109*  --  104  --  105   CO2 mmol/L 18.0*  --  18.4*  --  17.8*  --  19.0*   BUN mg/dL  "10  --  11  --  15  --  15   CREATININE mg/dL 0.36*  --  0.33*  --  0.41*  --  0.30*   GLUCOSE mg/dL 86  --  89  --  124*  --  100*    < > = values in this interval not displayed.   Estimated Creatinine Clearance: 134 mL/min (A) (by C-G formula based on SCr of 0.36 mg/dL (L)).        Results from last 7 days   Lab Units 07/13/24  0412 07/12/24  0545 07/11/24  1241 07/11/24  0750 07/10/24  0412 07/09/24  1001 07/08/24  0340 07/07/24  0607 07/06/24  2031   CALCIUM mg/dL 7.3* 7.4*  --  6.8* 7.2*   < > 8.1* 8.4* 8.8   MAGNESIUM mg/dL  --   --  2.2  --   --   --  2.0 2.3 2.3    < > = values in this interval not displayed.         Coag   Results from last 7 days   Lab Units 07/07/24  1805   INR  1.18*     HbA1C   Lab Results   Component Value Date    HGBA1C 5.20 07/06/2024     Infection     Radiology(recent) No radiology results for the last day  No results found for: \"TROPONINT\", \"TROPONINI\", \"BNP\"  No components found for: \"TSH;2\"    aspirin, 81 mg, Oral, Daily  atorvastatin, 80 mg, Oral, Nightly  atropine, 1 mg, Intravenous, Once  carvedilol, 3.125 mg, Oral, BID With Meals  castor oil-balsam peru, 1 Application, Topical, Q12H  enoxaparin, 40 mg, Subcutaneous, Daily  losartan, 25 mg, Oral, Q24H  sodium chloride, 10 mL, Intravenous, Q12H  ticagrelor, 90 mg, Oral, Q12H      procainamide (PRONESTYL) 2,000 mg in dextrose (D5W) 5 % 500 mL IVPB, 1-4 mg/min, Last Rate: Stopped (07/09/24 0929)  sodium chloride, 50 mL/hr, Last Rate: 50 mL/hr (07/11/24 1019)    Diet: Cardiac; Healthy Heart (2-3 Na+); No Mixed Consistencies; Texture: Mechanical Ground (NDD 2); Fluid Consistency: Nectar Thick      Assessment & Plan      Active Hospital Problems    Diagnosis  POA    **Acute ST elevation myocardial infarction (STEMI) involving right coronary artery [I21.11]  Yes    Diarrhea [R19.7]  No    Acute ischemic right MCA stroke [I63.511]  No    Abnormal EKG [R94.31]  Yes    Torsades de pointes [I47.21]  Yes      Resolved Hospital Problems "   No resolved problems to display.     Ms. Molina is a 73 y.o. female w/ spinal stenosis admitted for syncopal episode and noted to have acute inferior STEMI.  Initially admitted by cardiology and underwent PCI of distal RCA on 7/6/2024 as well as LAD.  She developed prolonged torsade de pointes and subsequently underwent repeat cardiac cath on 7/7/2024 with transvenous pacer.  Hospital course was complicated by sudden onset of left-sided weakness and facial droop and she was found to have M2 occlusion and underwent mechanical thrombectomy on 7/7/2024.  She has also been followed by neurology as well as pulmonology.       Inferior STEMI  Heart block, resolved following revascularization  Ventricular arrhythmias, resolved following revascularization  Ischemic CM  -TTE with EF 35%  -Status post PCI and REENA distal RCA, REENA of left main and proximal LAD  -Cardiology following  - on aspirin, atorvastatin and ticagrelor.      Cardioembolic stroke  -Following diagnostic cath 7/7/2024  -Underwent right MCA M2 thrombectomy  -PT/OT/ST     HTN  -Losartan  -Coreg     Hypokalemia  -replace as needed    Diarrhea  -No recent antibiotics, low suspicion for infectious etiology, probably related to initiation of p.o. diet  -improved       DW staff    Likely can dc cortrac today as eating more    Dispo- to acute rehab maybe Monday    Godwin Mena MD  [facility]  07/13/24  16:50 EDT

## 2024-07-14 LAB
ANION GAP SERPL CALCULATED.3IONS-SCNC: 11 MMOL/L (ref 5–15)
BUN SERPL-MCNC: 11 MG/DL (ref 8–23)
BUN/CREAT SERPL: 26.2 (ref 7–25)
CALCIUM SPEC-SCNC: 7.5 MG/DL (ref 8.6–10.5)
CHLORIDE SERPL-SCNC: 105 MMOL/L (ref 98–107)
CO2 SERPL-SCNC: 20 MMOL/L (ref 22–29)
CREAT SERPL-MCNC: 0.42 MG/DL (ref 0.57–1)
EGFRCR SERPLBLD CKD-EPI 2021: 103.4 ML/MIN/1.73
GLUCOSE SERPL-MCNC: 79 MG/DL (ref 65–99)
POTASSIUM SERPL-SCNC: 3.6 MMOL/L (ref 3.5–5.2)
SODIUM SERPL-SCNC: 136 MMOL/L (ref 136–145)

## 2024-07-14 PROCEDURE — 80048 BASIC METABOLIC PNL TOTAL CA: CPT | Performed by: INTERNAL MEDICINE

## 2024-07-14 PROCEDURE — 99232 SBSQ HOSP IP/OBS MODERATE 35: CPT | Performed by: INTERNAL MEDICINE

## 2024-07-14 PROCEDURE — 25010000002 ENOXAPARIN PER 10 MG: Performed by: INTERNAL MEDICINE

## 2024-07-14 RX ORDER — POTASSIUM CHLORIDE 1.5 G/1.58G
40 POWDER, FOR SOLUTION ORAL EVERY 4 HOURS
Status: COMPLETED | OUTPATIENT
Start: 2024-07-14 | End: 2024-07-14

## 2024-07-14 RX ADMIN — ATORVASTATIN CALCIUM 80 MG: 80 TABLET, FILM COATED ORAL at 20:27

## 2024-07-14 RX ADMIN — CARVEDILOL 3.12 MG: 3.12 TABLET, FILM COATED ORAL at 17:17

## 2024-07-14 RX ADMIN — POTASSIUM CHLORIDE 40 MEQ: 1.5 POWDER, FOR SOLUTION ORAL at 08:46

## 2024-07-14 RX ADMIN — TICAGRELOR 90 MG: 90 TABLET ORAL at 20:27

## 2024-07-14 RX ADMIN — TICAGRELOR 90 MG: 90 TABLET ORAL at 08:46

## 2024-07-14 RX ADMIN — ENOXAPARIN SODIUM 40 MG: 100 INJECTION SUBCUTANEOUS at 08:45

## 2024-07-14 RX ADMIN — Medication 10 ML: at 08:46

## 2024-07-14 RX ADMIN — POTASSIUM CHLORIDE 40 MEQ: 1.5 POWDER, FOR SOLUTION ORAL at 11:36

## 2024-07-14 RX ADMIN — ASPIRIN 81 MG: 81 TABLET, COATED ORAL at 08:46

## 2024-07-14 RX ADMIN — Medication 10 ML: at 21:13

## 2024-07-14 RX ADMIN — CASTOR OIL AND BALSAM, PERU 1 APPLICATION: 788; 87 OINTMENT TOPICAL at 20:27

## 2024-07-14 RX ADMIN — LOSARTAN POTASSIUM 25 MG: 25 TABLET, FILM COATED ORAL at 08:46

## 2024-07-14 RX ADMIN — CARVEDILOL 3.12 MG: 3.12 TABLET, FILM COATED ORAL at 08:46

## 2024-07-14 RX ADMIN — CASTOR OIL AND BALSAM, PERU 1 APPLICATION: 788; 87 OINTMENT TOPICAL at 08:46
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
## 2024-07-14 NOTE — PLAN OF CARE
Problem: Adult Inpatient Plan of Care  Goal: Plan of Care Review  Outcome: Ongoing, Progressing     Problem: Adult Inpatient Plan of Care  Goal: Absence of Hospital-Acquired Illness or Injury  Intervention: Identify and Manage Fall Risk  Recent Flowsheet Documentation  Taken 7/14/2024 0751 by Yvonne Christie RN  Safety Promotion/Fall Prevention:   safety round/check completed   fall prevention program maintained     Problem: Skin Injury Risk Increased  Goal: Skin Health and Integrity  Intervention: Optimize Skin Protection  Recent Flowsheet Documentation  Taken 7/14/2024 0751 by Yvonne Christie RN  Head of Bed (HOB) Positioning: HOB elevated     Problem: Fall Injury Risk  Goal: Absence of Fall and Fall-Related Injury  Intervention: Promote Injury-Free Environment  Recent Flowsheet Documentation  Taken 7/14/2024 0751 by Yvonne Christie RN  Safety Promotion/Fall Prevention:   safety round/check completed   fall prevention program maintained     Problem: Ongoing Anesthesia/Sedation Effects (Cardiac Catheterization)  Goal: Anesthesia/Sedation Recovery  Intervention: Optimize Anesthesia Recovery  Recent Flowsheet Documentation  Taken 7/14/2024 0751 by Yvonne Christie RN  Safety Promotion/Fall Prevention:   safety round/check completed   fall prevention program maintained     Problem: Vascular Access Protection (Cardiac Catheterization)  Goal: Absence of Vascular Access Complication  Intervention: Prevent Access Site Complications  Recent Flowsheet Documentation  Taken 7/14/2024 0751 by Yvonne Christie RN  Head of Bed (HOB) Positioning: HOB elevated     Problem: Respiratory Compromise (Stroke, Ischemic/Transient Ischemic Attack)  Goal: Effective Oxygenation and Ventilation  Intervention: Optimize Oxygenation and Ventilation  Recent Flowsheet Documentation  Taken 7/14/2024 0751 by Yvonne Christie RN  Head of Bed (HOB) Positioning: HOB elevated   Goal Outcome Evaluation:

## 2024-07-14 NOTE — PROGRESS NOTES
Name: Jailene Molina ADMIT: 2024   : 1950  PCP: Allan Daugherty MD    MRN: [id number] LOS: 9 days   AGE/SEX: 73 y.o. female  ROOM: [facility]   Subjective   Chief Complaint   Patient presents with    Slow Heart Rate    Hypotension     Still with weakness  Taking PO- eating more- denies any N/V or abdominal pain.   Working with therapists     ROS  No f/c  No n/v  No cp/palp  No soa/cough    Objective   Vital Signs  Temp:  [97.3 °F (36.3 °C)-98.4 °F (36.9 °C)] 97.7 °F (36.5 °C)  Heart Rate:  [66-71] 69  Resp:  [16-18] 18  BP: (101-136)/(60-73) 108/67  SpO2:  [94 %-100 %] 94 %  on   ;   Device (Oxygen Therapy): room air  Body mass index is 21.72 kg/m².    Physical Exam  Constitutional:       General: She is not in acute distress.     Appearance: She is ill-appearing.   HENT:      Head: Normocephalic and atraumatic.   Eyes:      General: No scleral icterus.  Cardiovascular:      Rate and Rhythm: Regular rhythm.      Heart sounds: Normal heart sounds.   Pulmonary:      Effort: Pulmonary effort is normal. No respiratory distress.   Abdominal:      General: There is no distension.      Palpations: Abdomen is soft.      Tenderness: There is no abdominal tenderness. There is no guarding.   Musculoskeletal:      Cervical back: Neck supple.   Neurological:      Mental Status: She is alert.   Psychiatric:         Behavior: Behavior normal.     +cortrac  +weakness  +facial asymmetry     Results Review:       I reviewed the patient's new clinical results.  Results from last 7 days   Lab Units 24  0617 24  0750 07/10/24  0412 24  1001   WBC 10*3/mm3 10.94* 12.48* 10.87* 12.53*   HEMOGLOBIN g/dL 12.0 13.0 12.7 12.3   PLATELETS 10*3/mm3 216 199 166 165     Results from last 7 days   Lab Units 24  0411 24  0412 24  1844 24  0545 24  0006 24  0750   SODIUM mmol/L 136 135*  --  136  --  133*   POTASSIUM mmol/L 3.6 4.2 4.4 3.5   < > 2.5*   CHLORIDE mmol/L 105 107  --   "109*  --  104   CO2 mmol/L 20.0* 18.0*  --  18.4*  --  17.8*   BUN mg/dL 11 10  --  11  --  15   CREATININE mg/dL 0.42* 0.36*  --  0.33*  --  0.41*   GLUCOSE mg/dL 79 86  --  89  --  124*    < > = values in this interval not displayed.   Estimated Creatinine Clearance: 114.9 mL/min (A) (by C-G formula based on SCr of 0.42 mg/dL (L)).        Results from last 7 days   Lab Units 07/14/24  0411 07/13/24  0412 07/12/24  0545 07/11/24  1241 07/11/24  0750 07/09/24  1001 07/08/24  0340   CALCIUM mg/dL 7.5* 7.3* 7.4*  --  6.8*   < > 8.1*   MAGNESIUM mg/dL  --   --   --  2.2  --   --  2.0    < > = values in this interval not displayed.         Coag   Results from last 7 days   Lab Units 07/07/24  1805   INR  1.18*     HbA1C   Lab Results   Component Value Date    HGBA1C 5.20 07/06/2024     Infection     Radiology(recent) No radiology results for the last day  No results found for: \"TROPONINT\", \"TROPONINI\", \"BNP\"  No components found for: \"TSH;2\"    aspirin, 81 mg, Oral, Daily  atorvastatin, 80 mg, Oral, Nightly  atropine, 1 mg, Intravenous, Once  carvedilol, 3.125 mg, Oral, BID With Meals  castor oil-balsam peru, 1 Application, Topical, Q12H  enoxaparin, 40 mg, Subcutaneous, Daily  losartan, 25 mg, Oral, Q24H  sodium chloride, 10 mL, Intravenous, Q12H  ticagrelor, 90 mg, Oral, Q12H      procainamide (PRONESTYL) 2,000 mg in dextrose (D5W) 5 % 500 mL IVPB, 1-4 mg/min, Last Rate: Stopped (07/09/24 0929)  sodium chloride, 50 mL/hr, Last Rate: 50 mL/hr (07/11/24 1019)    Diet: Cardiac; Healthy Heart (2-3 Na+); No Mixed Consistencies; Texture: Mechanical Ground (NDD 2); Fluid Consistency: Nectar Thick      Assessment & Plan      Active Hospital Problems    Diagnosis  POA    **Acute ST elevation myocardial infarction (STEMI) involving right coronary artery [I21.11]  Yes    Diarrhea [R19.7]  No    Acute ischemic right MCA stroke [I63.511]  No    Abnormal EKG [R94.31]  Yes    Torsades de pointes [I47.21]  Yes      Resolved Hospital " Problems   No resolved problems to display.     Ms. Molina is a 73 y.o. female w/ spinal stenosis admitted for syncopal episode and noted to have acute inferior STEMI.  Initially admitted by cardiology and underwent PCI of distal RCA on 7/6/2024 as well as LAD.  She developed prolonged torsade de pointes and subsequently underwent repeat cardiac cath on 7/7/2024 with transvenous pacer.  Hospital course was complicated by sudden onset of left-sided weakness and facial droop and she was found to have M2 occlusion and underwent mechanical thrombectomy on 7/7/2024.  She has also been followed by neurology as well as pulmonology.       Inferior STEMI  Heart block, resolved following revascularization  Ventricular arrhythmias, resolved following revascularization  Ischemic CM  -TTE with EF 35%  -Status post PCI and REENA distal RCA, REENA of left main and proximal LAD  -Cardiology following  - on aspirin, atorvastatin and ticagrelor.      Cardioembolic stroke  -Following diagnostic cath 7/7/2024  -Underwent right MCA M2 thrombectomy  -PT/OT/ST     HTN  -Losartan  -Coreg     Hypokalemia  -replace as needed    Diarrhea  -seems to be resolved    Abnormal imaging outpatient  From review it looks like she was going to have outpatient CT A/P- this appears to be to evaluate incidental loops of bowel seen on back imaging. The fact she is having no abdominal pain, no N/V or constipation I don't think she needs the CT at this point.        DW staff        Dispo- to acute rehab maybe Monday    Godwin Mena MD  [facility]  07/14/24  16:50 EDT

## 2024-07-15 PROCEDURE — 99232 SBSQ HOSP IP/OBS MODERATE 35: CPT | Performed by: INTERNAL MEDICINE

## 2024-07-15 PROCEDURE — 25810000003 SODIUM CHLORIDE 0.9 % SOLUTION: Performed by: INTERNAL MEDICINE

## 2024-07-15 PROCEDURE — 97110 THERAPEUTIC EXERCISES: CPT

## 2024-07-15 PROCEDURE — 25010000002 ENOXAPARIN PER 10 MG: Performed by: INTERNAL MEDICINE

## 2024-07-15 RX ADMIN — TICAGRELOR 90 MG: 90 TABLET ORAL at 09:29

## 2024-07-15 RX ADMIN — CASTOR OIL AND BALSAM, PERU 1 APPLICATION: 788; 87 OINTMENT TOPICAL at 21:40

## 2024-07-15 RX ADMIN — CARVEDILOL 3.12 MG: 3.12 TABLET, FILM COATED ORAL at 18:15

## 2024-07-15 RX ADMIN — ENOXAPARIN SODIUM 40 MG: 100 INJECTION SUBCUTANEOUS at 09:39

## 2024-07-15 RX ADMIN — SODIUM CHLORIDE 50 ML/HR: 9 INJECTION, SOLUTION INTRAVENOUS at 00:55

## 2024-07-15 RX ADMIN — CASTOR OIL AND BALSAM, PERU 1 APPLICATION: 788; 87 OINTMENT TOPICAL at 09:42

## 2024-07-15 RX ADMIN — TICAGRELOR 90 MG: 90 TABLET ORAL at 21:40

## 2024-07-15 RX ADMIN — ATORVASTATIN CALCIUM 80 MG: 80 TABLET, FILM COATED ORAL at 21:40

## 2024-07-15 RX ADMIN — Medication 10 ML: at 10:05

## 2024-07-15 RX ADMIN — LOSARTAN POTASSIUM 25 MG: 25 TABLET, FILM COATED ORAL at 09:29

## 2024-07-15 RX ADMIN — Medication 10 ML: at 22:25

## 2024-07-15 RX ADMIN — CARVEDILOL 3.12 MG: 3.12 TABLET, FILM COATED ORAL at 09:29

## 2024-07-15 RX ADMIN — ASPIRIN 81 MG: 81 TABLET, COATED ORAL at 09:36
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
## 2024-07-15 NOTE — PLAN OF CARE
Goal Outcome Evaluation:  Plan of Care Reviewed With: patient        Progress: no change  Outcome Evaluation: Patient seen this afternoon for PT and per RN BP a little low this afternoon. Pt completed activities in bed. Max VC throughout session to attend to L side. She completed AAROM for LUE shoulder, elbow, and hand. She also completed AP BLE x10 reps and worked on resistive knee/hip extension. Encouraged pt to have family stand on L side when visiting as well to promote pt attending to L side. Will progress as pt tolerates. Plan acute rehab at [place].      Anticipated Discharge Disposition (PT): inpatient rehabilitation facility

## 2024-07-15 NOTE — PROGRESS NOTES
Name: Jailene Molina ADMIT: 2024   : 1950  PCP: Allan Daugherty MD    MRN: [id number] LOS: 10 days   AGE/SEX: 73 y.o. female  ROOM: Counts include 234 beds at the [facility]   Subjective   Chief Complaint   Patient presents with    Slow Heart Rate    Hypotension     Still with weakness  Working with therapists   Sleeping this morning when I saw her  Came back this afternoon and she is awake    ROS  No f/c  No n/v  No cp/palp  No soa/cough    Objective   Vital Signs  Temp:  [97.2 °F (36.2 °C)-98.2 °F (36.8 °C)] 97.9 °F (36.6 °C)  Heart Rate:  [69-74] 69  Resp:  [17-18] 18  BP: (112-150)/(58-89) 150/89  SpO2:  [93 %-100 %] 100 %  on   ;   Device (Oxygen Therapy): room air  Body mass index is 21.72 kg/m².    Physical Exam  Constitutional:       General: She is not in acute distress.     Appearance: She is ill-appearing.   HENT:      Head: Normocephalic and atraumatic.   Eyes:      General: No scleral icterus.  Pulmonary:      Effort: Pulmonary effort is normal. No respiratory distress.   Abdominal:      General: There is no distension.      Palpations: Abdomen is soft.      Tenderness: There is no abdominal tenderness. There is no guarding.   Musculoskeletal:      Cervical back: Neck supple.     alert  Pleasant, chronically ill   Left sided weakness  Some swelling of both arms/hands. More so in her left which is effected by her weakness. Intact pulses though hand a little cooler        Results Review:       I reviewed the patient's new clinical results.  Results from last 7 days   Lab Units 24  0617 24  0750 07/10/24  0412 24  1001   WBC 10*3/mm3 10.94* 12.48* 10.87* 12.53*   HEMOGLOBIN g/dL 12.0 13.0 12.7 12.3   PLATELETS 10*3/mm3 216 199 166 165     Results from last 7 days   Lab Units 24  0411 24  0412 24  1844 24  0545 24  0006 24  0750   SODIUM mmol/L 136 135*  --  136  --  133*   POTASSIUM mmol/L 3.6 4.2 4.4 3.5   < > 2.5*   CHLORIDE mmol/L 105 107  --  109*  --  104   CO2  "mmol/L 20.0* 18.0*  --  18.4*  --  17.8*   BUN mg/dL 11 10  --  11  --  15   CREATININE mg/dL 0.42* 0.36*  --  0.33*  --  0.41*   GLUCOSE mg/dL 79 86  --  89  --  124*    < > = values in this interval not displayed.   Estimated Creatinine Clearance: 114.9 mL/min (A) (by C-G formula based on SCr of 0.42 mg/dL (L)).        Results from last 7 days   Lab Units 07/14/24  0411 07/13/24  0412 07/12/24  0545 07/11/24  1241 07/11/24  0750   CALCIUM mg/dL 7.5* 7.3* 7.4*  --  6.8*   MAGNESIUM mg/dL  --   --   --  2.2  --          Coag         HbA1C   Lab Results   Component Value Date    HGBA1C 5.20 07/06/2024     Infection     Radiology(recent) No radiology results for the last day  No results found for: \"TROPONINT\", \"TROPONINI\", \"BNP\"  No components found for: \"TSH;2\"    aspirin, 81 mg, Oral, Daily  atorvastatin, 80 mg, Oral, Nightly  atropine, 1 mg, Intravenous, Once  carvedilol, 3.125 mg, Oral, BID With Meals  castor oil-balsam peru, 1 Application, Topical, Q12H  enoxaparin, 40 mg, Subcutaneous, Daily  losartan, 25 mg, Oral, Q24H  sodium chloride, 10 mL, Intravenous, Q12H  ticagrelor, 90 mg, Oral, Q12H      procainamide (PRONESTYL) 2,000 mg in dextrose (D5W) 5 % 500 mL IVPB, 1-4 mg/min, Last Rate: Stopped (07/09/24 0929)    Diet: Cardiac; Healthy Heart (2-3 Na+); No Mixed Consistencies; Texture: Mechanical Ground (NDD 2); Fluid Consistency: Nectar Thick      Assessment & Plan      Active Hospital Problems    Diagnosis  POA    **Acute ST elevation myocardial infarction (STEMI) involving right coronary artery [I21.11]  Yes    Diarrhea [R19.7]  No    Acute ischemic right MCA stroke [I63.511]  No    Abnormal EKG [R94.31]  Yes    Torsades de pointes [I47.21]  Yes      Resolved Hospital Problems   No resolved problems to display.     Ms. Molina is a 73 y.o. female w/ spinal stenosis admitted for syncopal episode and noted to have acute inferior STEMI.  Initially admitted by cardiology and underwent PCI of distal RCA on " 7/6/2024 as well as LAD.  She developed prolonged torsade de pointes and subsequently underwent repeat cardiac cath on 7/7/2024 with transvenous pacer.  Hospital course was complicated by sudden onset of left-sided weakness and facial droop and she was found to have M2 occlusion and underwent mechanical thrombectomy on 7/7/2024.  She has also been followed by neurology as well as pulmonology.       Inferior STEMI  Heart block, resolved following revascularization  Ventricular arrhythmias, resolved following revascularization  Ischemic CM  -TTE with EF 35%  -Status post PCI and REENA distal RCA, REENA of left main and proximal LAD  -Cardiology following  - on aspirin, atorvastatin and ticagrelor.      Cardioembolic stroke  -Following diagnostic cath 7/7/2024  -Underwent right MCA M2 thrombectomy  -PT/OT/ST     HTN  -Losartan  -Coreg     Hypokalemia  -replace as needed    Diarrhea  -seems to be resolved    Abnormal imaging outpatient  From review it looks like she was going to have outpatient CT A/P- this appears to be to evaluate incidental loops of bowel seen on back imaging. The fact she is having no abdominal pain, no N/V or constipation I don't think she needs the CT at this point.        DW staff    Some swelling of both arms/hands. More so in her left which is effected by her weakness. Intact pulses though left hand a little cooler. seems like related to her decreased movement of hand from stroke. If can help her with moving it to improve circulation. Will also take out her IVs in that arm. Could also be some Raynoud phenomenon but will continue to monitor     Dispo- to acute rehab whenever bed available    Godwin Mena MD  [facility]  07/15/24  16:50 EDT

## 2024-07-15 NOTE — CASE MANAGEMENT/SOCIAL WORK
Continued Stay Note  [facility]     Patient Name: Jailene Molina  MRN: [id number]  Today's Date: 7/15/2024    Admit Date: 7/5/2024    Plan: [facility] pending bed availability.   Discharge Plan       Row Name 07/15/24 0931       Plan    Plan [facility] can accept pending bed availability.    Patient/Family in Agreement with Plan yes    Plan Comments Call placed to [facility] to check bed status. States no bed available today but will continue to follow. Received call from Sana/friend asking for update. Says she will inform patient. Packet in office. Will need ambulance or stretcher transport. Continue to follow......Iqra Mg RN, CCP                   Discharge Codes    No documentation.                 Expected Discharge Date and Time       Expected Discharge Date Expected Discharge Time    Jul 15, 2024               Iqra Mg RN

## 2024-07-15 NOTE — PROGRESS NOTES
"    Patient Name: Jailene Molina  :1950  73 y.o.      Patient Care Team:  Allan Daugherty MD as PCP - General (Internal Medicine)  Samantha Chau MD as Consulting Physician (Obstetrics and Gynecology)  Gemma Zabala MD as Consulting Physician (Obstetrics and Gynecology)    Chief Complaint:   STEMI  Interval History:   No further angina.  Can wiggle left-sided fingers.  Has some residual left-sided neglect.  Waiting for bed at [facility]    Objective   Vital Signs  Temp:  [97.2 °F (36.2 °C)-98.2 °F (36.8 °C)] 97.9 °F (36.6 °C)  Heart Rate:  [69-74] 69  Resp:  [17-18] 18  BP: (108-150)/(58-89) 150/89    Intake/Output Summary (Last 24 hours) at 7/15/2024 0940  Last data filed at 7/15/2024 0520  Gross per 24 hour   Intake --   Output 1650 ml   Net -1650 ml     Flowsheet Rows      Flowsheet Row First Filed Value   Admission Height 167.6 cm (66\") Documented at 2024   Admission Weight 57.6 kg (127 lb) Documented at 2024            Physical Exam:   General Appearance:    Alert, cooperative, in no acute distress   Lungs:     Clear to auscultation.  Normal respiratory effort and rate.      Heart:    Regular rhythm and normal rate, normal S1 and S2, no murmurs, gallops or rubs.     Chest Wall:    No abnormalities observed   Abdomen:     Soft, nontender, positive bowel sounds.     Extremities:   no cyanosis, clubbing or edema.  No marked joint deformities.  Adequate musculoskeletal strength.       Results Review:    Results from last 7 days   Lab Units 24  0411   SODIUM mmol/L 136   POTASSIUM mmol/L 3.6   CHLORIDE mmol/L 105   CO2 mmol/L 20.0*   BUN mg/dL 11   CREATININE mg/dL 0.42*   GLUCOSE mg/dL 79   CALCIUM mg/dL 7.5*           Results from last 7 days   Lab Units 24  0617   WBC 10*3/mm3 10.94*   HEMOGLOBIN g/dL 12.0   HEMATOCRIT % 35.2   PLATELETS 10*3/mm3 216           Results from last 7 days   Lab Units 24  1241   MAGNESIUM mg/dL 2.2                     Medication " Review:   aspirin, 81 mg, Oral, Daily  atorvastatin, 80 mg, Oral, Nightly  atropine, 1 mg, Intravenous, Once  carvedilol, 3.125 mg, Oral, BID With Meals  castor oil-balsam peru, 1 Application, Topical, Q12H  enoxaparin, 40 mg, Subcutaneous, Daily  losartan, 25 mg, Oral, Q24H  sodium chloride, 10 mL, Intravenous, Q12H  ticagrelor, 90 mg, Oral, Q12H         procainamide (PRONESTYL) 2,000 mg in dextrose (D5W) 5 % 500 mL IVPB, 1-4 mg/min, Last Rate: Stopped (07/09/24 0929)        Assessment & Plan   Acute inferior STEMI s/p PCI distal RCA 7/6  Severe calcified ostial LAD l esion 99% s/p PCI 7/6  Sinus bradycardia/ 2:1 HB resolved after reperfusion 7/6  VF requiring 1 defibrillation during RCA PCI 7/6  Polymophic VT/torsades- on po amio and procainamide. TVP for overdrive pacing. Began 7/7  Ischemic CM: ef 35%.   7.   Cardioembolic CVA after diagnostic cath 7/7    From a cardiac standpoint she is stable.  Continue aspirin and Brilinta for coronary disease  Coreg and losartan for ischemic cardiomyopathy  Statin  May be discharged for rehab at any point.  I will see her in the office personally in 2 weeks    Maya Lebron MD  [facility]  07/15/24  09:40 EDT

## 2024-07-16 ENCOUNTER — APPOINTMENT (OUTPATIENT)
Dept: CARDIOLOGY | Facility: HOSPITAL | Age: 74
End: 2024-07-16
Payer: MEDICARE

## 2024-07-16 VITALS
OXYGEN SATURATION: 99 % | RESPIRATION RATE: 16 BRPM | HEART RATE: 75 BPM | BODY MASS INDEX: 21.61 KG/M2 | HEIGHT: 66 IN | WEIGHT: 134.48 LBS | SYSTOLIC BLOOD PRESSURE: 127 MMHG | DIASTOLIC BLOOD PRESSURE: 65 MMHG | TEMPERATURE: 97.7 F

## 2024-07-16 LAB
BH CV UPPER VENOUS LEFT AXILLARY AUGMENT: NORMAL
BH CV UPPER VENOUS LEFT AXILLARY COMPRESS: NORMAL
BH CV UPPER VENOUS LEFT AXILLARY PHASIC: NORMAL
BH CV UPPER VENOUS LEFT AXILLARY SPONT: NORMAL
BH CV UPPER VENOUS LEFT BASILIC FOREARM COMPRESS: NORMAL
BH CV UPPER VENOUS LEFT BASILIC UPPER COMPRESS: NORMAL
BH CV UPPER VENOUS LEFT BRACHIAL COMPRESS: NORMAL
BH CV UPPER VENOUS LEFT CEPHALIC FOREARM COLOR: 1
BH CV UPPER VENOUS LEFT CEPHALIC FOREARM COMPRESS: NORMAL
BH CV UPPER VENOUS LEFT CEPHALIC FOREARM THROMBUS: NORMAL
BH CV UPPER VENOUS LEFT CEPHALIC UPPER COMPRESS: NORMAL
BH CV UPPER VENOUS LEFT INTERNAL JUGULAR AUGMENT: NORMAL
BH CV UPPER VENOUS LEFT INTERNAL JUGULAR COMPRESS: NORMAL
BH CV UPPER VENOUS LEFT INTERNAL JUGULAR PHASIC: NORMAL
BH CV UPPER VENOUS LEFT INTERNAL JUGULAR SPONT: NORMAL
BH CV UPPER VENOUS LEFT RADIAL COMPRESS: NORMAL
BH CV UPPER VENOUS LEFT SUBCLAVIAN AUGMENT: NORMAL
BH CV UPPER VENOUS LEFT SUBCLAVIAN COMPRESS: NORMAL
BH CV UPPER VENOUS LEFT SUBCLAVIAN PHASIC: NORMAL
BH CV UPPER VENOUS LEFT SUBCLAVIAN SPONT: NORMAL
BH CV UPPER VENOUS LEFT ULNAR COMPRESS: NORMAL
BH CV UPPER VENOUS RIGHT INTERNAL JUGULAR AUGMENT: NORMAL
BH CV UPPER VENOUS RIGHT INTERNAL JUGULAR COMPRESS: NORMAL
BH CV UPPER VENOUS RIGHT INTERNAL JUGULAR PHASIC: NORMAL
BH CV UPPER VENOUS RIGHT INTERNAL JUGULAR SPONT: NORMAL
BH CV UPPER VENOUS RIGHT SUBCLAVIAN AUGMENT: NORMAL
BH CV UPPER VENOUS RIGHT SUBCLAVIAN COMPRESS: NORMAL
BH CV UPPER VENOUS RIGHT SUBCLAVIAN PHASIC: NORMAL
BH CV UPPER VENOUS RIGHT SUBCLAVIAN SPONT: NORMAL
BH CV VAS PRELIMINARY FINDINGS SCRIPTING: 1

## 2024-07-16 PROCEDURE — 97110 THERAPEUTIC EXERCISES: CPT

## 2024-07-16 PROCEDURE — 93971 EXTREMITY STUDY: CPT | Performed by: SURGERY

## 2024-07-16 PROCEDURE — 25010000002 ENOXAPARIN PER 10 MG: Performed by: INTERNAL MEDICINE

## 2024-07-16 PROCEDURE — 97112 NEUROMUSCULAR REEDUCATION: CPT

## 2024-07-16 PROCEDURE — 99232 SBSQ HOSP IP/OBS MODERATE 35: CPT | Performed by: INTERNAL MEDICINE

## 2024-07-16 PROCEDURE — 93971 EXTREMITY STUDY: CPT

## 2024-07-16 PROCEDURE — 97535 SELF CARE MNGMENT TRAINING: CPT

## 2024-07-16 RX ORDER — ASPIRIN 81 MG/1
81 TABLET ORAL DAILY
Start: 2024-07-17

## 2024-07-16 RX ORDER — CASTOR OIL AND BALSAM, PERU 788; 87 MG/G; MG/G
1 OINTMENT TOPICAL EVERY 12 HOURS SCHEDULED
Start: 2024-07-16

## 2024-07-16 RX ORDER — CARVEDILOL 3.12 MG/1
3.12 TABLET ORAL 2 TIMES DAILY WITH MEALS
Start: 2024-07-16

## 2024-07-16 RX ORDER — LOSARTAN POTASSIUM 25 MG/1
25 TABLET ORAL
Start: 2024-07-17

## 2024-07-16 RX ORDER — ATORVASTATIN CALCIUM 80 MG/1
80 TABLET, FILM COATED ORAL NIGHTLY
Start: 2024-07-16

## 2024-07-16 RX ORDER — BISACODYL 10 MG
10 SUPPOSITORY, RECTAL RECTAL ONCE
Status: COMPLETED | OUTPATIENT
Start: 2024-07-16 | End: 2024-07-16

## 2024-07-16 RX ORDER — AMOXICILLIN 250 MG
2 CAPSULE ORAL DAILY
Status: DISCONTINUED | OUTPATIENT
Start: 2024-07-16 | End: 2024-07-16 | Stop reason: HOSPADM

## 2024-07-16 RX ADMIN — Medication 10 ML: at 08:32

## 2024-07-16 RX ADMIN — TICAGRELOR 90 MG: 90 TABLET ORAL at 08:32

## 2024-07-16 RX ADMIN — CASTOR OIL AND BALSAM, PERU 1 APPLICATION: 788; 87 OINTMENT TOPICAL at 08:32

## 2024-07-16 RX ADMIN — SENNOSIDES AND DOCUSATE SODIUM 2 TABLET: 50; 8.6 TABLET ORAL at 12:00

## 2024-07-16 RX ADMIN — BISACODYL 10 MG: 10 SUPPOSITORY RECTAL at 12:00

## 2024-07-16 RX ADMIN — ENOXAPARIN SODIUM 40 MG: 100 INJECTION SUBCUTANEOUS at 08:32

## 2024-07-16 RX ADMIN — CARVEDILOL 3.12 MG: 3.12 TABLET, FILM COATED ORAL at 08:32

## 2024-07-16 RX ADMIN — ACETAMINOPHEN 325MG 650 MG: 325 TABLET ORAL at 00:06

## 2024-07-16 RX ADMIN — LOSARTAN POTASSIUM 25 MG: 25 TABLET, FILM COATED ORAL at 08:32

## 2024-07-16 RX ADMIN — ASPIRIN 81 MG: 81 TABLET, COATED ORAL at 08:32

## 2024-07-16 NOTE — CASE MANAGEMENT/SOCIAL WORK
Continued Stay Note  [facility]     Patient Name: Jailene Molina  MRN: [id number]  Today's Date: 7/16/2024    Admit Date: 7/5/2024    Plan: [facility] per [facility] @ 5 pm.   Discharge Plan       Row Name 07/16/24 1208       Plan    Plan [facility] per [facility] @ 5 pm.    Plan Comments Call placed to [facility] to check bed status. States bed available today. Received call from Sana/friend regarding bed availablity. Informed her bed available and patient to dc. [facility] arranged for pickup @ 1700. Packet given to RN to complete. No additional needs noted......Iqra Mg RN, CCP                   Discharge Codes    No documentation.                 Expected Discharge Date and Time       Expected Discharge Date Expected Discharge Time    Jul 16, 2024               Iqra Mg RN

## 2024-07-16 NOTE — CASE MANAGEMENT/SOCIAL WORK
Case Management Discharge Note      Final Note: [facility] per [facility]         Selected Continued Care - Admitted Since 7/5/2024       Destination    No services have been selected for the patient.                Durable Medical Equipment    No services have been selected for the patient.                Dialysis/Infusion    No services have been selected for the patient.                Home Medical Care    No services have been selected for the patient.                Therapy    No services have been selected for the patient.                Community Resources    No services have been selected for the patient.                Community & DME    No services have been selected for the patient.                    Transportation Services  Ambulance: Other ([facility])    Final Discharge Disposition Code: 62 - inpatient rehab facility
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
## 2024-07-16 NOTE — PROGRESS NOTES
"    Patient Name: Jailene Molina  :1950  73 y.o.      Patient Care Team:  Allan Daugherty MD as PCP - General (Internal Medicine)  Samantha Chau MD as Consulting Physician (Obstetrics and Gynecology)  Gemma Zabala MD as Consulting Physician (Obstetrics and Gynecology)    Chief Complaint:   STEMI  Interval History:   Left hand is edematous, good cap refill, excellent radial pulse. Family worried about arm and hand swelling  Pt slept poorly overnight    Objective   Vital Signs  Temp:  [97.5 °F (36.4 °C)-98.2 °F (36.8 °C)] 97.5 °F (36.4 °C)  Heart Rate:  [63-79] 63  Resp:  [16-18] 18  BP: ()/(53-81) 143/81    Intake/Output Summary (Last 24 hours) at 2024 0938  Last data filed at 7/15/2024 1800  Gross per 24 hour   Intake 420 ml   Output 800 ml   Net -380 ml     Flowsheet Rows      Flowsheet Row First Filed Value   Admission Height 167.6 cm (66\") Documented at 2024   Admission Weight 57.6 kg (127 lb) Documented at 2024            Physical Exam:   General Appearance:    Alert, cooperative, in no acute distress   Lungs:     Clear to auscultation.  Normal respiratory effort and rate.      Heart:    Regular rhythm and normal rate, normal S1 and S2, no murmurs, gallops or rubs.     Chest Wall:    No abnormalities observed   Abdomen:     Soft, nontender, positive bowel sounds.     Extremities:   no cyanosis, clubbing or edema.  No marked joint deformities.  Adequate musculoskeletal strength.       Results Review:    Results from last 7 days   Lab Units 24  0411   SODIUM mmol/L 136   POTASSIUM mmol/L 3.6   CHLORIDE mmol/L 105   CO2 mmol/L 20.0*   BUN mg/dL 11   CREATININE mg/dL 0.42*   GLUCOSE mg/dL 79   CALCIUM mg/dL 7.5*           Results from last 7 days   Lab Units 24  0617   WBC 10*3/mm3 10.94*   HEMOGLOBIN g/dL 12.0   HEMATOCRIT % 35.2   PLATELETS 10*3/mm3 216           Results from last 7 days   Lab Units 24  1241   MAGNESIUM mg/dL 2.2             "         Medication Review:   aspirin, 81 mg, Oral, Daily  atorvastatin, 80 mg, Oral, Nightly  atropine, 1 mg, Intravenous, Once  carvedilol, 3.125 mg, Oral, BID With Meals  castor oil-balsam peru, 1 Application, Topical, Q12H  enoxaparin, 40 mg, Subcutaneous, Daily  losartan, 25 mg, Oral, Q24H  sodium chloride, 10 mL, Intravenous, Q12H  ticagrelor, 90 mg, Oral, Q12H         procainamide (PRONESTYL) 2,000 mg in dextrose (D5W) 5 % 500 mL IVPB, 1-4 mg/min, Last Rate: Stopped (07/09/24 0929)        Assessment & Plan   Acute inferior STEMI s/p PCI distal RCA 7/6  Severe calcified ostial LAD l esion 99% s/p PCI 7/6  Sinus bradycardia/ 2:1 HB resolved after reperfusion 7/6  VF requiring 1 defibrillation during RCA PCI 7/6  Polymophic VT/torsades- on po amio and procainamide. TVP for overdrive pacing. Began 7/7  Ischemic CM: ef 35%.   7.   Cardioembolic CVA after diagnostic cath 7/7    From a cardiac standpoint she is stable.  Continue aspirin and Brilinta for coronary disease  Coreg and losartan for ischemic cardiomyopathy  Statin  Doppler LUE to rule out DVT    Maya Lebron MD  [facility]  07/16/24  09:38 EDT

## 2024-07-16 NOTE — PLAN OF CARE
Goal Outcome Evaluation:              Outcome Evaluation: Pt agrees to OT treatment on this date. Pt supine when this OT enters room. Pt performs supine>sit with HOB slightly elevated and to Pt's right needing MOD A to assist BLE and to bring Upper Trunk into upright sitting position. Once sitting, Pt needed assist to center self and scoot hips to EOB. Pt required MOD A for static sitting balance and VC and TC for centering of upper trunk. PROM performed on LUE: shoulder flexion/extension, scapular elevation/protraction/retraction, elbow flexion/extension, and extension/flexion on all digits. Pt does state that she is now able to feel slight pain in her fingers. Pt participated in a nueromuscular re-education actiivty putting weight thru left affected limb when sitting EOB. Pt performs sit>stand to NO DME needing MAX A to clear hips, balance, and safety. Pt unable to take steps. Pt performs stand>sit and MOD A for controlled descent.
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
## 2024-07-16 NOTE — DISCHARGE SUMMARY
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 bolus  of IV contrast. Standard perfusion maps were constructed with RAPID  software. A CT angiogram of the head and neck was then performed with 1  mm axial images. Sagittal, coronal, and 3-dimensional reconstructed  images were obtained. Finally, a delayed postcontrast head CT was  performed with 3 mm axial images.     FINDINGS:     NONCONTRAST HEAD CT: Intravenous contrast is seen within the blood pool  from the patient's recent coronary catheterization. The gray-white  matter differentiation is within normal limits. The basal ganglia and  thalami are unremarkable. The posterior fossa structures are within  normal limits.     CT PERFUSION STUDY: There is an abnormality on the time to maximal  enhancement greater than 6 seconds maps within the lateral aspect of the  right frontal lobe that measures up to 60 cc. No abnormality is seen on  the CBF less than 30% maps. This mismatch volume is 60 cc. However, of  note, there is a larger abnormality seen on the time to maximal  enhancement greater than 4-second maps which measures approximately 189  cc and is seen within the lateral aspect of the right frontal, parietal,  and to lesser extent temporal and occipital lobes.     CTA NECK: There is a bovine configuration of the aortic  arch. There is a  mild degree of stenosis at the origin of the left subclavian artery. The  V3 segment of the right vertebral artery is obscured by multiple  adjacent venous branches. Otherwise, the vertebral arteries are  unremarkable in appearance. Atherosclerotic changes are evident within  the right carotid bifurcation although there is no significant NASCET  stenosis evident within the proximal portion of the right ICA. Within  the left ICA, there is an approximate 25% stenosis by NASCET criteria.     There is a small right pleural effusion.     CTA HEAD: There is a short segment occlusion of the superior division of  the right MCA which is seen approximately 2.5 cm distal to its origin.  The internal carotid arteries are remarkable for up to a moderate degree  of stenosis within the supraclinoid segment of the right ICA. Mild to  moderate degrees of stenoses are identified within the left carotid  siphon and the cavernous segment of the right ICA. The left middle  cerebral artery and both anterior cerebral arteries are unremarkable.  The vertebral arteries, basilar artery, and left posterior cerebral  artery are unremarkable. There is a severe degree of stenosis within the  P3 segment of the right PCA.      Impression:        On the CT perfusion study, there is a 60 cc abnormality noted within the  lateral aspect of the right frontal lobe on the time to maximum  enhancement greater than 6-second maps compatible with an area of  high-grade hypoperfusion. No area of acute completed infarction is seen  on the CBF less than 30% map. However, of note, there is a larger  abnormality seen on the time to maximal enhancement greater than 4  seconds maps within the lateral aspect the right cerebral hemisphere  measuring up to 189 cc and this is compatible with an area of lower  grade hypoperfusion.     On the CT angiogram of the head, there is a short segment occlusion of  the superior division of the right MCA and this  "is seen approximately  2.5 cm distal to its origin     There is up to a moderate degree of stenosis within the supraclinoid  segment of the right ICA and mild to moderate degrees of stenoses are  identified elsewhere within the carotid siphons.     There is a severe degree of stenosis within the P3 segment of the right  PCA.     There is an approximate 25% NASCET stenosis within the proximal portion  of the left ICA. No significant NASCET stenosis is seen within the right  ICA although atherosclerotic changes are noted within the right internal  carotid artery.     The findings of the noncontrast head CT were discussed with Dr. Stack on 07/07/2024 at approximately 5:42 p.m. The findings of the  CT angiogram and CT perfusion study were discussed with Dr. Stack at  approximately 6 p.m.        Radiation dose reduction techniques were utilized, including automated  exposure control and exposure modulation based on body size.          PHYSICAL EXAM  Objective:  Vital signs: (most recent): Blood pressure 127/65, pulse 75, temperature 97.7 °F (36.5 °C), temperature source Oral, resp. rate 16, height 167.6 cm (65.98\"), weight 61 kg (134 lb 7.7 oz), SpO2 99%, not currently breastfeeding.            alert  Pleasant, chronically ill   Left sided weakness  Some swelling of both arms/hands. More so in her left which is effected by her weakness. Intact pulses though hand a little cooler    CONDITION ON DISCHARGE  Stable.      DISCHARGE DISPOSITION   Skilled Nursing Facility (DC - External)      DISCHARGE MEDICATIONS       Your medication list        START taking these medications        Instructions Last Dose Given Next Dose Due   aspirin 81 MG EC tablet  Start taking on: July 17, 2024      Take 1 tablet by mouth Daily.       atorvastatin 80 MG tablet  Commonly known as: LIPITOR      Take 1 tablet by mouth Every Night.       carvedilol 3.125 MG tablet  Commonly known as: COREG      Take 1 tablet by mouth 2 (Two) Times a " Day With Meals.       castor oil-balsam peru ointment      Apply 1 Application topically to the appropriate area as directed Every 12 (Twelve) Hours. Apply to guilherme heels, keep heels off-loaded with heel boots at all times. .       losartan 25 MG tablet  Commonly known as: COZAAR  Start taking on: July 17, 2024      Take 1 tablet by mouth Daily.       ticagrelor 90 MG tablet tablet  Commonly known as: BRILINTA      Take 1 tablet by mouth Every 12 (Twelve) Hours.              CONTINUE taking these medications        Instructions Last Dose Given Next Dose Due   acetaminophen 500 MG tablet  Commonly known as: TYLENOL      2 tabs PO Q 8 hours PRN       FOSTEUM PLUS PO      Take  by mouth. Take 1 twice daily       Vitamin D-3 25 MCG (1000 UT) capsule      Take  by mouth.              STOP taking these medications      celecoxib 200 MG capsule  Commonly known as: CeleBREX        ibuprofen 200 MG tablet  Commonly known as: ADVIL,MOTRIN                  Where to Get Your Medications        Information about where to get these medications is not yet available    Ask your nurse or doctor about these medications  aspirin 81 MG EC tablet  atorvastatin 80 MG tablet  carvedilol 3.125 MG tablet  castor oil-balsam peru ointment  losartan 25 MG tablet  ticagrelor 90 MG tablet tablet          Future Appointments   Date Time Provider Department Center   7/26/2024 10:45 AM Maya Lebron MD [id number] None   7/30/2024  1:30 PM Danilo Dumont MD [facility]   9/19/2024 11:30 AM [facility]   9/24/2024  1:00 PM Allan Daugherty MD [facility]   12/5/2024  2:30 PM REFERRED INJECTION CHAIR EP  [facility]     Additional Instructions for the Follow-ups that You Need to Schedule       Ambulatory Referral to Cardiac Rehab   As directed      Discharge Follow-up with Specialty: Rehab physician this week, PCP after dc from rehab, Cardiology in 2 weeks, Neurology in 4 weeks   As directed      Specialty:  Rehab physician this week, PCP after dc from rehab, Cardiology in 2 weeks, Neurology in 4 weeks               Follow-up Information       [facility] .    Specialty: Cardiac Rehabilitation  Contact information:  [address] [place] [address] [phone]             Allan Daugherty MD .    Specialty: Internal Medicine  Contact information:  [address]  [place] [id number] [phone]                             TEST  RESULTS PENDING AT DISCHARGE         Godwin Mena MD  [facility]  07/16/24  12:01 EDT      Time: greater than 32 minutes on discharge  It was a pleasure taking care of this patient while in the hospital.      Heart score 3.  EKG unchanged from 11/20.  Deborah Orellana PA-C HPI: 48 y/o female presents to the ED c/o intermittent chest pain x5 days. Pt reports pain started on left-sided chest and has radiated to mid chest and back. Pt notes pain worse in the morning, worsens with arm movement and while exercising on elliptical. Notes dyspnea on exertion while using elliptical. Describes pain as sharp. Pt with a Hx of sinus bradycardia, saw cardio Dr. Villarreal in 2019 due to chest pain which was attributed to anxiety. Seen 4 months ago with similar complaints, normal troponin and d-dimer. No heavy lifting, no injury.   Pt. appears well, heart score 3.  Will obtain baseline labs, Troponin x 1, CXR, Dimer and reassess.  Deborah Orellana PA-C HPI: 48 y/o female presents to the ED c/o intermittent chest pain x5 days. Pt reports pain started on left-sided chest and has radiated to mid chest and back. Pt notes pain worse in the morning, worsens with arm movement and while exercising on elliptical. Notes dyspnea on exertion while using elliptical. Describes pain as sharp. Pt with a Hx of sinus bradycardia, saw cardio Dr. Villarreal in 2019 due to chest pain which was attributed to anxiety. Seen 4 months ago with similar complaints, normal troponin and d-dimer. No heavy lifting, no injury.   Pt. appears well, heart score 3.  Will obtain baseline labs, Troponin x 1, CXR, reassess.  Deborah Orellana PA-C Labs unremarkable with negative Troponin.  CXR unremarkable.  Pt. has cardiologist to follow with.  Strict return precautions discussed.  Deborah Orellana PA-C

## 2024-07-16 NOTE — PLAN OF CARE
Goal Outcome Evaluation:  Plan of Care Reviewed With: patient, family         Pt planned to d/c this morning, but [facility] did not have a bed. Left arm cold, swelling in hand and fore arm, and some cyanosis noted. Discussed with attending, his recommendation is to d/c the IV's on that arm, and elevation and movement of the extremity during shift.

## 2024-07-24 ENCOUNTER — TELEPHONE (OUTPATIENT)
Dept: CARDIOLOGY | Facility: CLINIC | Age: 74
End: 2024-07-24

## 2024-07-24 NOTE — TELEPHONE ENCOUNTER
"    Name: Jailene Molina \"Linda\"    Relationship: Self    Best Callback Number: [phone]    Incoming call to the Hub, requesting to  Cancel their HFU appointment on 07.26.24.     Per Hub workflow, further review is needed before the task can be completed.    Result of Call: Transferred to JONA FARMER  at the practice      "

## 2024-07-26 ENCOUNTER — PATIENT OUTREACH (OUTPATIENT)
Dept: CASE MANAGEMENT | Facility: OTHER | Age: 74
End: 2024-07-26
Payer: MEDICARE

## 2024-07-26 NOTE — OUTREACH NOTE
Patient Outreach    AMBULATORY CASE MANAGEMENT NOTE    Names and Relationships of Patient/Support Persons: Contact: Bk; Relationship: Other -     Care Coordination    Call placed to [facility] and spoke with Bk [phone]. Pt remains admitted with no anticipated DC date at this time.         Julienne NICHOLS  Ambulatory Case Management    7/26/2024, 10:14 EDT

## 2024-08-02 ENCOUNTER — PATIENT OUTREACH (OUTPATIENT)
Dept: CASE MANAGEMENT | Facility: OTHER | Age: 74
End: 2024-08-02
Payer: MEDICARE

## 2024-08-02 ENCOUNTER — READMISSION MANAGEMENT (OUTPATIENT)
Dept: CALL CENTER | Facility: HOSPITAL | Age: 74
End: 2024-08-02
Payer: MEDICARE

## 2024-08-02 NOTE — OUTREACH NOTE
Prep Survey      Flowsheet Row Responses   [facility] facility patient discharged from? Non-BH   Is LACE score < 7 ? Non-BH Discharge   Eligibility [facility] rehab   Date of Discharge 08/07/24   Discharge diagnosis unknown   Does the patient have one of the following disease processes/diagnoses(primary or secondary)? Other   Prep survey completed? Yes            Vannesa MIX - Registered Nurse

## 2024-08-02 NOTE — OUTREACH NOTE
Patient Outreach    AMBULATORY CASE MANAGEMENT NOTE    Names and Relationships of Patient/Support Persons: Contact: Jaylin; Relationship: Other -     Call placed to [facility] rehab. Per Jaylin  there pt remains in acute with anticipated DC date 8/7. Message sent to LIZETH.        Julienne NICHOLS  Ambulatory Case Management    8/2/2024, 13:03 EDT

## 2024-08-08 ENCOUNTER — TRANSITIONAL CARE MANAGEMENT TELEPHONE ENCOUNTER (OUTPATIENT)
Dept: CALL CENTER | Facility: HOSPITAL | Age: 74
End: 2024-08-08
Payer: MEDICARE

## 2024-08-08 NOTE — OUTREACH NOTE
Call Center TCM Note      Flowsheet Row Responses   [facility] patient discharged from? Non-BH   Does the patient have one of the following disease processes/diagnoses(primary or secondary)? Other   TCM attempt successful? No   Unsuccessful attempts Attempt 2   Call Status Left message            Sosa Duval RN    8/8/2024, 17:12 EDT

## 2024-08-08 NOTE — OUTREACH NOTE
Call Center TCM Note      Flowsheet Row Responses   [facility] patient discharged from? Non-  [[facility]]   Does the patient have one of the following disease processes/diagnoses(primary or secondary)? Other   TCM attempt successful? No   Unsuccessful attempts Attempt 1            Twila Silver RN    8/8/2024, 14:42 EDT

## 2024-08-09 ENCOUNTER — TRANSITIONAL CARE MANAGEMENT TELEPHONE ENCOUNTER (OUTPATIENT)
Dept: CALL CENTER | Facility: HOSPITAL | Age: 74
End: 2024-08-09
Payer: MEDICARE

## 2024-08-09 NOTE — OUTREACH NOTE
Call Center TCM Note      Flowsheet Row Responses   [facility] patient discharged from? Non-   Does the patient have one of the following disease processes/diagnoses(primary or secondary)? Other   TCM attempt successful? No   Unsuccessful attempts Attempt 3   Call Status Left message            Kristel Cortes RN    8/9/2024, 13:02 EDT

## 2024-08-13 ENCOUNTER — TELEPHONE (OUTPATIENT)
Dept: INTERNAL MEDICINE | Facility: CLINIC | Age: 74
End: 2024-08-13
Payer: MEDICARE

## 2024-08-13 NOTE — TELEPHONE ENCOUNTER
Pt calling stating she is currently at the [facility] and they are suggesting she get the pneumonia P20 vaccine- I did advise pt that her last pneumonia vaccine was done in 2017 - Pt is unsure if she needs this vaccine or not? Please advise      Best call back number [phone]

## 2024-08-13 NOTE — TELEPHONE ENCOUNTER
Patient does not require Prevnar 20 at this time.  Patient has completed Pneumovax 23 and Prevnar 13 as part of her standard age recommended vaccines in the clinic.

## 2024-08-14 ENCOUNTER — PATIENT OUTREACH (OUTPATIENT)
Dept: CASE MANAGEMENT | Facility: OTHER | Age: 74
End: 2024-08-14
Payer: MEDICARE

## 2024-08-14 NOTE — OUTREACH NOTE
Patient Outreach    AMBULATORY CASE MANAGEMENT NOTE    Names and Relationships of Patient/Support Persons: Contact: Nava; Relationship: Other -     Care Coordination     Noted in chart that pt has contacted PCP office and reports she is in [facility]. Call placed to [facility] [phone]. Pt was admitted there 8/7. No plans for DC this week. Next care plan meeting is 9/21. [facility] sent to [facility].        Julienne NICHOLS  Ambulatory Case Management    8/14/2024, 09:33 EDT

## 2024-08-16 ENCOUNTER — OFFICE VISIT (OUTPATIENT)
Age: 74
End: 2024-08-16
Payer: MEDICARE

## 2024-08-16 VITALS
SYSTOLIC BLOOD PRESSURE: 112 MMHG | WEIGHT: 123 LBS | DIASTOLIC BLOOD PRESSURE: 74 MMHG | BODY MASS INDEX: 19.77 KG/M2 | HEIGHT: 66 IN | HEART RATE: 67 BPM

## 2024-08-16 DIAGNOSIS — I50.42 CHRONIC COMBINED SYSTOLIC AND DIASTOLIC CONGESTIVE HEART FAILURE: Primary | ICD-10-CM

## 2024-08-16 RX ORDER — AMANTADINE HYDROCHLORIDE 100 MG/1
100 CAPSULE, GELATIN COATED ORAL 2 TIMES DAILY
COMMUNITY

## 2024-08-16 RX ORDER — LIDOCAINE 4 G/G
1 PATCH TOPICAL EVERY 24 HOURS
COMMUNITY

## 2024-08-16 RX ORDER — SENNOSIDES A AND B 8.6 MG/1
1 TABLET, FILM COATED ORAL 2 TIMES DAILY PRN
COMMUNITY

## 2024-08-16 RX ORDER — BISACODYL 5 MG/1
5 TABLET, DELAYED RELEASE ORAL DAILY PRN
COMMUNITY
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
## 2024-08-21 ENCOUNTER — TELEPHONE (OUTPATIENT)
Dept: CARDIAC REHAB | Facility: HOSPITAL | Age: 74
End: 2024-08-21
Payer: MEDICARE

## 2024-08-21 NOTE — TELEPHONE ENCOUNTER
Provided patient with contact information regarding  cardiac rehab. Pt will need to call to schedule when ready to attend.

## 2024-08-22 ENCOUNTER — PATIENT OUTREACH (OUTPATIENT)
Dept: CASE MANAGEMENT | Facility: OTHER | Age: 74
End: 2024-08-22
Payer: MEDICARE

## 2024-08-22 NOTE — OUTREACH NOTE
"Patient Outreach    AMBULATORY CASE MANAGEMENT NOTE    Names and Relationships of Patient/Support Persons: Contact: Jailene Molina \"Linda\"; Relationship: Self -     Care Coordination    Call placed to [facility] and spoke with  Ledy. At this time there is not DC date for patient. She remains at skilled level of care.         Julienne NICHOLS  Ambulatory Case Management    8/22/2024, 11:25 EDT  "

## 2024-08-27 ENCOUNTER — OFFICE VISIT (OUTPATIENT)
Dept: NEUROSURGERY | Facility: CLINIC | Age: 74
End: 2024-08-27
Payer: MEDICARE

## 2024-08-27 VITALS
WEIGHT: 128.8 LBS | DIASTOLIC BLOOD PRESSURE: 70 MMHG | HEIGHT: 66 IN | BODY MASS INDEX: 20.7 KG/M2 | SYSTOLIC BLOOD PRESSURE: 130 MMHG | TEMPERATURE: 98.2 F | HEART RATE: 61 BPM | OXYGEN SATURATION: 99 %

## 2024-08-27 DIAGNOSIS — I63.511 ACUTE ISCHEMIC RIGHT MCA STROKE: Primary | ICD-10-CM

## 2024-08-27 DIAGNOSIS — I21.11 ACUTE ST ELEVATION MYOCARDIAL INFARCTION (STEMI) INVOLVING RIGHT CORONARY ARTERY: ICD-10-CM

## 2024-08-27 PROCEDURE — 99214 OFFICE O/P EST MOD 30 MIN: CPT | Performed by: RADIOLOGY

## 2024-09-03 ENCOUNTER — PATIENT OUTREACH (OUTPATIENT)
Dept: CASE MANAGEMENT | Facility: OTHER | Age: 74
End: 2024-09-03
Payer: MEDICARE

## 2024-09-03 NOTE — OUTREACH NOTE
"Patient Outreach    AMBULATORY CASE MANAGEMENT NOTE    Names and Relationships of Patient/Support Persons: Contact: Jailene Molina \"Linda\"; Relationship: Self -     Care Coordination      Call placed to Nubia RIVERA with [facility]. No DC date at this time. Care plan meeting will be on Wednesday.         Julienne NICHOLS  Ambulatory Case Management    9/3/2024, 09:57 EDT  "

## 2024-09-06 ENCOUNTER — PATIENT OUTREACH (OUTPATIENT)
Dept: CASE MANAGEMENT | Facility: OTHER | Age: 74
End: 2024-09-06
Payer: MEDICARE

## 2024-09-06 ENCOUNTER — TELEPHONE (OUTPATIENT)
Dept: NEUROSURGERY | Facility: CLINIC | Age: 74
End: 2024-09-06

## 2024-09-06 NOTE — TELEPHONE ENCOUNTER
Spoke with Dustin from [facility] who was calling on behalf of patient to see if a referral will be needed for patient's F/U at stroke clinic or if we were going to schedule that appointment for her as she is trying to assist the patient with this, please call and advise and just in case we may need her fax number it is [phone], thanks!

## 2024-09-06 NOTE — OUTREACH NOTE
Patient Outreach    AMBULATORY CASE MANAGEMENT NOTE    Names and Relationships of Patient/Support Persons: Contact: [facility]; Relationship: Other -     Call placed to [facility] [phone] unable to get  on the line or ability to leave message on her phone. ACM was transferred by  to rehab nurses station. Dustin was able to confirm that pt remains in rehab. She does not have a DC date.  Nubia is in a meeting. She will ask that she calls back to [facility] with anticipated DC date.         Julienne NICHOLS  Ambulatory Case Management    9/6/2024, 10:42 EDT

## 2024-09-09 ENCOUNTER — PATIENT OUTREACH (OUTPATIENT)
Dept: CASE MANAGEMENT | Facility: OTHER | Age: 74
End: 2024-09-09
Payer: MEDICARE

## 2024-09-09 NOTE — OUTREACH NOTE
Patient Outreach    AMBULATORY CASE MANAGEMENT NOTE    Names and Relationships of Patient/Support Persons: Contact: Nubia; Relationship:  -     Care Coordination    SNF Follow-up    Call placed to Kerry Garcia [phone]. Spoke with Nubia in . No anticipated DC date at this time.         Julienne NICHOLS  Ambulatory Case Management    9/9/2024, 11:20 EDT

## 2024-09-10 ENCOUNTER — TELEPHONE (OUTPATIENT)
Dept: NEUROSURGERY | Facility: CLINIC | Age: 74
End: 2024-09-10
Payer: MEDICARE

## 2024-09-12 ENCOUNTER — TELEPHONE (OUTPATIENT)
Dept: INTERNAL MEDICINE | Facility: CLINIC | Age: 74
End: 2024-09-12
Payer: MEDICARE

## 2024-09-12 NOTE — TELEPHONE ENCOUNTER
"    Caller: Jailene Molina \"Linda\"    Relationship to patient: Self    Best call back number: [phone]       Type of visit: LABS    Requested date: 9/19/24 - CANCEL APPOINTMENT    Additional notes:PATIENT IS IN REHAB AND WILL NOT BE ABLE TO MAKE THIS APPOINTMENT.  PLEASE CANCEL.      "

## 2024-09-13 ENCOUNTER — PATIENT OUTREACH (OUTPATIENT)
Dept: CASE MANAGEMENT | Facility: OTHER | Age: 74
End: 2024-09-13
Payer: MEDICARE

## 2024-09-13 NOTE — OUTREACH NOTE
Patient Outreach    AMBULATORY CASE MANAGEMENT NOTE    Names and Relationships of Patient/Support Persons: Contact: Nubia; Relationship: Other -     Care Coordination    SNF Follow-up  Call placed to [facility] Garcia [phone]. Spoke with Nubia in . No anticipated DC date at this time.           Julienne NICHOLS  Ambulatory Case Management    9/13/2024, 09:33 EDT

## 2024-09-20 ENCOUNTER — PATIENT OUTREACH (OUTPATIENT)
Dept: CASE MANAGEMENT | Facility: OTHER | Age: 74
End: 2024-09-20
Payer: MEDICARE

## 2024-10-21 ENCOUNTER — OFFICE VISIT (OUTPATIENT)
Dept: NEUROLOGY | Facility: CLINIC | Age: 74
End: 2024-10-21
Payer: MEDICARE

## 2024-10-21 VITALS
HEIGHT: 66 IN | HEART RATE: 73 BPM | WEIGHT: 133 LBS | RESPIRATION RATE: 20 BRPM | BODY MASS INDEX: 21.38 KG/M2 | DIASTOLIC BLOOD PRESSURE: 82 MMHG | SYSTOLIC BLOOD PRESSURE: 132 MMHG | OXYGEN SATURATION: 98 %

## 2024-10-21 DIAGNOSIS — Z86.73 HISTORY OF STROKE: Primary | ICD-10-CM

## 2024-10-21 DIAGNOSIS — M54.81 OCCIPITAL NEURALGIA OF RIGHT SIDE: ICD-10-CM

## 2024-10-21 RX ORDER — CALCIUM CARBONATE/MAG CARB 250-300 MG
1 TABLET ORAL DAILY
COMMUNITY

## 2024-10-21 RX ORDER — RIMEGEPANT SULFATE 75 MG/75MG
75 TABLET, ORALLY DISINTEGRATING ORAL EVERY OTHER DAY
Qty: 15 TABLET | Refills: 2 | Status: SHIPPED | OUTPATIENT
Start: 2024-10-21 | End: 2025-01-19

## 2024-10-21 RX ORDER — AMITRIPTYLINE HYDROCHLORIDE 100 MG/1
100 TABLET ORAL 2 TIMES DAILY
COMMUNITY
End: 2024-10-21

## 2024-10-21 RX ORDER — PAROXETINE 20 MG/1
20 TABLET, FILM COATED ORAL DAILY
Qty: 30 TABLET | Refills: 2 | Status: CANCELLED | OUTPATIENT
Start: 2024-10-21 | End: 2025-10-21

## 2024-10-21 RX ORDER — MAGNESIUM OXIDE 400 MG/1
400 TABLET ORAL NIGHTLY
COMMUNITY

## 2024-10-21 RX ORDER — TRAZODONE HYDROCHLORIDE 50 MG/1
50 TABLET, FILM COATED ORAL NIGHTLY
COMMUNITY
Start: 2024-09-18

## 2024-10-21 RX ORDER — ESCITALOPRAM OXALATE 20 MG/1
20 TABLET ORAL DAILY
Qty: 30 TABLET | Refills: 2 | Status: SHIPPED | OUTPATIENT
Start: 2024-10-21 | End: 2025-10-21
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
## 2024-10-30 ENCOUNTER — TELEPHONE (OUTPATIENT)
Dept: NEUROLOGY | Facility: CLINIC | Age: 74
End: 2024-10-30

## 2024-10-30 NOTE — TELEPHONE ENCOUNTER
I could see him Thursday at 2:50. Please discuss with patient/wife.   Provider: Sreekanth     Caller: REGINALD WITH [facility]    Phone Number: [phone]    Reason for Call: REGINALD WITH [facility] CALLED AND AND STATES THAT AT APPT PROVIDER INCREASED PT TO 100MG OF AMITRIPTYLINE AND SINCE APPT FACILITY HAD TO DECREASE MEDICATION TO 50MG AS PT WAS HAVING SLURRED SPEECH, FATIGUE AND HAD FALLEN TWICE. FACILITY WAS WANTING TO MAKE PROVIDER AWARE OF MED CHANGE.    THANK YOU

## 2024-11-15 ENCOUNTER — TELEPHONE (OUTPATIENT)
Dept: INTERNAL MEDICINE | Facility: CLINIC | Age: 74
End: 2024-11-15
Payer: MEDICARE

## 2024-11-15 NOTE — TELEPHONE ENCOUNTER
Well this is difficult to tell over message.  Additionally, I have not seen patient in over a year.  I do not have results from his CT scan that was ordered July 2024.  Happy to see patient in office but difficult to provide much guidance at this time via message

## 2024-11-15 NOTE — TELEPHONE ENCOUNTER
Patient name does not match patient chart.  Referred back to hub for correction as well as copied to  and director

## 2024-11-15 NOTE — TELEPHONE ENCOUNTER
Caller: Lala Koo     Relationship: [facility]    Best call back number: [phone]    What is your medical concern? PATIENT IS HAVING DIARRHEA AT LEAST ONCE A WEEK    How long has this issue been going on? 4-6 WEEKS    Is your provider already aware of this issue? YES    Have you been treated for this issue? YES    LALA BELIEVES SHE WAS TOLD THAT PATIENT HAS A LOOPING BOWEL.    LAAL IS QUESTIONING IF THE LOOPING BOWEL CAN BE THE CAUSE OF HER HAVING EXPLOSIVE DIARRHEA AT LEAST ONCE WEEKLY.    LALA IS QUESTIONING IF THE CT SCAN OF ABDOMEN THAT WAS ORDERED BACK IN JULY 2024 WOULD SHOW IF THE LOOPING BOWEL WOULD HAVE ANYTHING TO DO WITH THE ISSUES OF DIARRHEA.    LALA ALSO IS QUESTIONING IF PATIENTS ADDED STRESS OF THE HEART ATTACK (07/03) AND STROKE (07/05 AK 07/06) COULD BE THE CAUSE OF THE DIARRHEA.

## 2024-11-18 ENCOUNTER — TELEPHONE (OUTPATIENT)
Dept: CARDIOLOGY | Facility: CLINIC | Age: 74
End: 2024-11-18
Payer: MEDICARE

## 2024-11-19 ENCOUNTER — OFFICE VISIT (OUTPATIENT)
Age: 74
End: 2024-11-19
Payer: MEDICARE

## 2024-11-19 ENCOUNTER — HOSPITAL ENCOUNTER (OUTPATIENT)
Dept: CARDIOLOGY | Facility: HOSPITAL | Age: 74
Discharge: HOME | End: 2024-11-19
Admitting: INTERNAL MEDICINE
Payer: MEDICARE

## 2024-11-19 VITALS
HEART RATE: 70 BPM | BODY MASS INDEX: 22.16 KG/M2 | HEIGHT: 65 IN | DIASTOLIC BLOOD PRESSURE: 80 MMHG | WEIGHT: 133 LBS | SYSTOLIC BLOOD PRESSURE: 128 MMHG

## 2024-11-19 VITALS
HEIGHT: 66 IN | BODY MASS INDEX: 20.89 KG/M2 | SYSTOLIC BLOOD PRESSURE: 128 MMHG | WEIGHT: 130 LBS | DIASTOLIC BLOOD PRESSURE: 80 MMHG

## 2024-11-19 DIAGNOSIS — I50.42 CHRONIC COMBINED SYSTOLIC AND DIASTOLIC CONGESTIVE HEART FAILURE: ICD-10-CM

## 2024-11-19 DIAGNOSIS — I25.5 ISCHEMIC CARDIOMYOPATHY: Primary | ICD-10-CM

## 2024-11-19 LAB
AORTIC DIMENSIONLESS INDEX: 73 (DI)
ASCENDING AORTA: 3.4 CM
BH CV ECHO MEAS - ACS: 1.78 CM
BH CV ECHO MEAS - AI P1/2T: 895.3 MSEC
BH CV ECHO MEAS - AO MAX PG: 3.5 MMHG
BH CV ECHO MEAS - AO MEAN PG: 2 MMHG
BH CV ECHO MEAS - AO ROOT AREA (BSA CORRECTED): 2 CM2
BH CV ECHO MEAS - AO ROOT DIAM: 3.4 CM
BH CV ECHO MEAS - AO V2 MAX: 93.7 CM/SEC
BH CV ECHO MEAS - AO V2 VTI: 22.6 CM
BH CV ECHO MEAS - AVA(I,D): 2.31 CM2
BH CV ECHO MEAS - EDV(MOD-SP2): 126 ML
BH CV ECHO MEAS - EDV(MOD-SP4): 153 ML
BH CV ECHO MEAS - EF(MOD-BP): 34 %
BH CV ECHO MEAS - EF(MOD-SP2): 33.3 %
BH CV ECHO MEAS - EF(MOD-SP4): 35.3 %
BH CV ECHO MEAS - ESV(MOD-SP2): 84 ML
BH CV ECHO MEAS - ESV(MOD-SP4): 99 ML
BH CV ECHO MEAS - LAT PEAK E' VEL: 4.1 CM/SEC
BH CV ECHO MEAS - LV DIASTOLIC VOL/BSA (35-75): 92 CM2
BH CV ECHO MEAS - LV MAX PG: 1.93 MMHG
BH CV ECHO MEAS - LV MEAN PG: 1 MMHG
BH CV ECHO MEAS - LV SYSTOLIC VOL/BSA (12-30): 59.5 CM2
BH CV ECHO MEAS - LV V1 MAX: 69.4 CM/SEC
BH CV ECHO MEAS - LV V1 VTI: 16.4 CM
BH CV ECHO MEAS - LVOT AREA: 3.2 CM2
BH CV ECHO MEAS - LVOT DIAM: 2.01 CM
BH CV ECHO MEAS - MED PEAK E' VEL: 2.4 CM/SEC
BH CV ECHO MEAS - MR MAX PG: 100.9 MMHG
BH CV ECHO MEAS - MR MAX VEL: 502.2 CM/SEC
BH CV ECHO MEAS - MV A DUR: 0.15 SEC
BH CV ECHO MEAS - MV A MAX VEL: 89.1 CM/SEC
BH CV ECHO MEAS - MV DEC SLOPE: 208.9 CM/SEC2
BH CV ECHO MEAS - MV DEC TIME: 0.26 SEC
BH CV ECHO MEAS - MV E MAX VEL: 36.8 CM/SEC
BH CV ECHO MEAS - MV E/A: 0.41
BH CV ECHO MEAS - MV MAX PG: 3.3 MMHG
BH CV ECHO MEAS - MV MEAN PG: 1 MMHG
BH CV ECHO MEAS - MV P1/2T: 70.7 MSEC
BH CV ECHO MEAS - MV V2 VTI: 22.2 CM
BH CV ECHO MEAS - MVA(P1/2T): 3.1 CM2
BH CV ECHO MEAS - MVA(VTI): 2.36 CM2
BH CV ECHO MEAS - PA V2 MAX: 70.6 CM/SEC
BH CV ECHO MEAS - RAP SYSTOLE: 3 MMHG
BH CV ECHO MEAS - RVSP: 23 MMHG
BH CV ECHO MEAS - SV(LVOT): 52.3 ML
BH CV ECHO MEAS - SV(MOD-SP2): 42 ML
BH CV ECHO MEAS - SV(MOD-SP4): 54 ML
BH CV ECHO MEAS - SVI(LVOT): 31.4 ML/M2
BH CV ECHO MEAS - SVI(MOD-SP2): 25.3 ML/M2
BH CV ECHO MEAS - SVI(MOD-SP4): 32.5 ML/M2
BH CV ECHO MEAS - TR MAX PG: 19.6 MMHG
BH CV ECHO MEAS - TR MAX VEL: 221.3 CM/SEC
BH CV ECHO MEASUREMENTS AVERAGE E/E' RATIO: 11.32
BH CV XLRA - TDI S': 7.9 CM/SEC
LEFT ATRIUM VOLUME INDEX: 27.4 ML/M2
SINUS: 3.2 CM
STJ: 3 CM

## 2024-11-19 PROCEDURE — 93321 DOPPLER ECHO F-UP/LMTD STD: CPT | Performed by: INTERNAL MEDICINE

## 2024-11-19 PROCEDURE — 93325 DOPPLER ECHO COLOR FLOW MAPG: CPT | Performed by: INTERNAL MEDICINE

## 2024-11-19 PROCEDURE — 93325 DOPPLER ECHO COLOR FLOW MAPG: CPT

## 2024-11-19 PROCEDURE — 25510000001 PERFLUTREN 6.52 MG/ML SUSPENSION 2 ML VIAL: Performed by: INTERNAL MEDICINE

## 2024-11-19 PROCEDURE — 93321 DOPPLER ECHO F-UP/LMTD STD: CPT

## 2024-11-19 PROCEDURE — 93308 TTE F-UP OR LMTD: CPT | Performed by: INTERNAL MEDICINE

## 2024-11-19 PROCEDURE — 99214 OFFICE O/P EST MOD 30 MIN: CPT | Performed by: INTERNAL MEDICINE

## 2024-11-19 PROCEDURE — 93308 TTE F-UP OR LMTD: CPT

## 2024-11-19 RX ORDER — SACUBITRIL AND VALSARTAN 24; 26 MG/1; MG/1
1 TABLET, FILM COATED ORAL 2 TIMES DAILY
Qty: 60 TABLET | Refills: 11 | Status: SHIPPED | OUTPATIENT
Start: 2024-11-19

## 2024-11-19 RX ORDER — AMITRIPTYLINE HYDROCHLORIDE 50 MG/1
50 TABLET ORAL NIGHTLY
COMMUNITY

## 2024-11-19 RX ADMIN — PERFLUTREN 1 ML: 6.52 INJECTION, SUSPENSION INTRAVENOUS at 15:13
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
## 2024-11-25 ENCOUNTER — TELEPHONE (OUTPATIENT)
Dept: CARDIOLOGY | Facility: CLINIC | Age: 74
End: 2024-11-25
Payer: MEDICARE

## 2024-11-25 NOTE — TELEPHONE ENCOUNTER
Mar the nurse with [facility] is calling to clarify meds.  She received a message from their pharmacy that the patient should not be on losartan and Entresto.     I have reviewed Dr. Lebron's recent office visit 11/19.  I let her know to stop losartan.  Dr. Lebron started Entresto 24-26 mg BID and Jardiance 10 mg daily.    I am removing losartan from pt EPIC med list.  I will fax over last office note to Mar at [phone]. Confirmation received.    Debbie Leslie RN  [facility] Triage  11/25/24 14:50 EST

## 2024-11-26 ENCOUNTER — OFFICE VISIT (OUTPATIENT)
Dept: INTERNAL MEDICINE | Facility: CLINIC | Age: 74
End: 2024-11-26
Payer: MEDICARE

## 2024-11-26 VITALS
SYSTOLIC BLOOD PRESSURE: 102 MMHG | HEART RATE: 73 BPM | OXYGEN SATURATION: 99 % | BODY MASS INDEX: 21.39 KG/M2 | HEIGHT: 65 IN | WEIGHT: 128.4 LBS | TEMPERATURE: 97.2 F | DIASTOLIC BLOOD PRESSURE: 62 MMHG

## 2024-11-26 DIAGNOSIS — R93.5 ABNORMAL ABDOMINAL X-RAY: ICD-10-CM

## 2024-11-26 DIAGNOSIS — K59.09 INTERMITTENT CONSTIPATION: ICD-10-CM

## 2024-11-26 DIAGNOSIS — I21.11 ACUTE ST ELEVATION MYOCARDIAL INFARCTION (STEMI) INVOLVING RIGHT CORONARY ARTERY: Primary | ICD-10-CM

## 2024-11-26 DIAGNOSIS — R19.8 IRREGULAR BOWEL HABITS: ICD-10-CM

## 2024-11-26 PROBLEM — R19.7 DIARRHEA: Status: RESOLVED | Noted: 2024-07-11 | Resolved: 2024-11-26

## 2024-11-26 PROCEDURE — 99214 OFFICE O/P EST MOD 30 MIN: CPT | Performed by: INTERNAL MEDICINE

## 2024-11-26 PROCEDURE — 1125F AMNT PAIN NOTED PAIN PRSNT: CPT | Performed by: INTERNAL MEDICINE

## 2024-11-26 PROCEDURE — G2211 COMPLEX E/M VISIT ADD ON: HCPCS | Performed by: INTERNAL MEDICINE

## 2024-11-26 PROCEDURE — 1160F RVW MEDS BY RX/DR IN RCRD: CPT | Performed by: INTERNAL MEDICINE

## 2024-11-26 PROCEDURE — 1159F MED LIST DOCD IN RCRD: CPT | Performed by: INTERNAL MEDICINE

## 2024-11-26 RX ORDER — POLYETHYLENE GLYCOL 3350 17 G/17G
17 POWDER, FOR SOLUTION ORAL DAILY PRN
Start: 2024-11-26

## 2024-11-26 NOTE — PROGRESS NOTES
"Diarrhea and dilated loops of bowel      HPI  Jailene Molina is a 74 y.o. female RTC in acute care:    Notes was dealing with some 'diarrhea' but not had any for last ~10 days.  Denies any water stool, but was 'soft and mushy' with urgency.  No pain associated.  'Hits all at once'.  Since most recent events had 'pretty good bowel movements', however states today not had BM for 'a while' (thinks last BM was 4 days ago).  Does think 'has break' after 'the diarrhea' and will stop BM's for a few days but pt notes 'I dont really know'.   Cousin present today and notes was dealing 'bouts' over months where has event    Baseline bowel habits is having a bowel movement 'every couple days', every two days.  Normal bowel habits are formed stools.     Later in conversation patient admits that she \"gets backed up\" and will have to ask the nurses at her care facility for something to help her go.  Has had a few doses of \"stool softener\" recently, but not sure what they are giving her.     Review of Systems   Constitutional: Negative for chills and fever.   Gastrointestinal:  Positive for change in bowel habit (Over last several months), bowel incontinence (A few events with urgency; no issues with control when has formed stools) and diarrhea (Intermittent urgent and soft stools, no watery stools). Negative for bloating, abdominal pain, hematochezia and melena.   Genitourinary:  Positive for genital sores.       The following portions of the patient's history were reviewed and updated as appropriate: allergies, current medications, past medical history, past social history, and problem list.      Current Outpatient Medications:     acetaminophen (TYLENOL) 500 MG tablet, 2 tabs PO Q 8 hours PRN, Disp: 30 tablet, Rfl: 0    amantadine (SYMMETREL) 100 MG capsule, Take 1 capsule by mouth 2 (Two) Times a Day., Disp: , Rfl:     amitriptyline (ELAVIL) 50 MG tablet, Take 1 tablet by mouth Every Night., Disp: , Rfl:     aspirin 81 MG EC " tablet, Take 1 tablet by mouth Daily., Disp: , Rfl:     aspirin-acetaminophen-caffeine (Excedrin Migraine) 250-250-65 MG per tablet, Take 1 tablet by mouth Every 8 (Eight) Hours As Needed for Headache., Disp: , Rfl:     atorvastatin (LIPITOR) 80 MG tablet, Take 1 tablet by mouth Every Night., Disp: , Rfl:     carvedilol (COREG) 3.125 MG tablet, Take 1 tablet by mouth 2 (Two) Times a Day With Meals., Disp: , Rfl:     Cholecalciferol (Vitamin D-3) 25 MCG (1000 UT) capsule, Take  by mouth., Disp: , Rfl:     Cyanocobalamin (B-12) 1000 MCG capsule, Take  by mouth., Disp: , Rfl:     empagliflozin (Jardiance) 10 MG tablet tablet, Take 1 tablet by mouth Daily., Disp: 30 tablet, Rfl: 11    escitalopram (Lexapro) 20 MG tablet, Take 1 tablet by mouth Daily., Disp: 30 tablet, Rfl: 2    Magnesium 200 MG chewable tablet, Chew., Disp: , Rfl:     magnesium oxide (MAG-OX) 400 MG tablet, Take 1 tablet by mouth Every Night., Disp: , Rfl:     sacubitril-valsartan (Entresto) 24-26 MG tablet, Take 1 tablet by mouth 2 (Two) Times a Day., Disp: 60 tablet, Rfl: 11    ticagrelor (BRILINTA) 90 MG tablet tablet, Take 1 tablet by mouth Every 12 (Twelve) Hours., Disp: , Rfl:     traZODone (DESYREL) 50 MG tablet, Take 1 tablet by mouth Every Night., Disp: , Rfl:     Calcium Carb-Magnesium Carb (MagneBind 300) 250-300 MG tablet, Take 1 tablet by mouth Daily., Disp: , Rfl:     castor oil-balsam peru (VENELEX) ointment, Apply 1 Application topically to the appropriate area as directed Every 12 (Twelve) Hours. Apply to guilherme heels, keep heels off-loaded with heel boots at all times. ., Disp: , Rfl:     Dietary Management Product (FOSTEUM PLUS PO), Take  by mouth. Take 1 twice daily, Disp: , Rfl:     polyethylene glycol (MIRALAX) 17 g packet, Take 17 g by mouth Daily As Needed (If misses bowel movement for greater than 48 hours)., Disp: , Rfl:     psyllium (METAMUCIL) 58.6 % powder, Start 1/2 tablespoon daily in AM for 1 week, then increase to 1  "tablespoon for 1 week, then increase to 1-1/2 tablespoons for 1 week, then increase to 2 tablespoons daily thereafter, Disp: , Rfl:     Riboflavin (VITAMIN B-2 PO), Take 400 mg by mouth Daily., Disp: , Rfl:     Rimegepant Sulfate (Nurtec) 75 MG tablet dispersible tablet, Take 1 tablet by mouth Every Other Day for 90 days., Disp: 15 tablet, Rfl: 2    Vitals:    11/26/24 1051   BP: 102/62   BP Location: Right arm   Patient Position: Sitting   Cuff Size: Adult   Pulse: 73   Temp: 97.2 °F (36.2 °C)   TempSrc: Infrared   SpO2: 99%   Weight: 58.2 kg (128 lb 6.4 oz)   Height: 165.1 cm (65\")     Body mass index is 21.37 kg/m².      Physical Exam  Vitals reviewed.   Constitutional:       General: She is not in acute distress.     Appearance: She is well-developed. She is not ill-appearing or toxic-appearing.   HENT:      Head: Normocephalic.   Pulmonary:      Effort: Pulmonary effort is normal. No respiratory distress.   Abdominal:      General: Bowel sounds are normal. There is no distension.      Palpations: Abdomen is soft. There is no mass.      Tenderness: There is no abdominal tenderness. There is no guarding or rebound.   Neurological:      Mental Status: She is alert and oriented to person, place, and time.   Psychiatric:         Behavior: Behavior normal.         Thought Content: Thought content normal.       XR abdomen, flat and upright: Dilated bowel loops, irregular bowel habits; no prior for comparison available  Dilated bowel loops noted with no air-fluid levels  Moderate stool and gas burden  No mass noted      Assessment/ Plan  Diagnoses and all orders for this visit:    Acute ST elevation myocardial infarction (STEMI) involving right coronary artery    Irregular bowel habits  -     XR Abdomen Flat & Upright  -     psyllium (METAMUCIL) 58.6 % powder; Start 1/2 tablespoon daily in AM for 1 week, then increase to 1 tablespoon for 1 week, then increase to 1-1/2 tablespoons for 1 week, then increase to 2 " "tablespoons daily thereafter  -     CT Abdomen Pelvis With Contrast; Future    Abnormal abdominal x-ray  -     XR Abdomen Flat & Upright  -     CT Abdomen Pelvis With Contrast; Future    Intermittent constipation  -     polyethylene glycol (MIRALAX) 17 g packet; Take 17 g by mouth Daily As Needed (If misses bowel movement for greater than 48 hours).  -     CT Abdomen Pelvis With Contrast; Future        Return for Next scheduled follow up.      Discussion:  Jailene Molina is a 74 y.o. female s/p ischemic right MCA stroke and STEMI with HFrEF RTC in acute care (new issue to examiner) accompanied by cousin today.  Patient currently residing in the [facility] and has been dealing with episodes of \"diarrhea\" with some bowel incontinence in the last several months.  Spent a good deal of time discussing situation with patient and cousin (who is acting as healthcare surrogate today) and appears patient is dealing with regular bowel habits, cycling between episodes of relative constipation followed by large volume soft and mushy stools.  Patient does not describe overt diarrhea (no increase in frequency or episodes of watery stools).  Suspect some confounding here with intermittent use of stimulant laxatives (present on patient medication list from [facility] reviewed today) as well as recent tramadol use (now D/C) as well as varying doses of qhs TCA medication.  Concern on 7/2024 lumbar spine XR for dilated bowel loops.  Abdominal XR flat and upright in office today showed persistent dilated bowel loops without air-fluid levels or evidence of overt obstruction.  Given persistence, would proceed with CT A/P with IV and oral contrast to evaluate for mass VS.  Intermittent volvulus VS.  Isolation of involved bowel.  Counseled patient, order placed  Ultimately, reassured patient today and strongly suspect patient primary issue is dealing with irregular bowel habits.  Will focus treatment on bowel regularity, starting " fiber supplement with Metamucil daily (titrating up to full dose over 4 weeks).  D/C stimulant laxative.  MiraLAX dosing PRN if misses BM greater than 48 hours (patient notes long-term baseline bowel habits of~q 2 days).     RTC as planned

## 2024-12-05 ENCOUNTER — INFUSION (OUTPATIENT)
Dept: ONCOLOGY | Facility: HOSPITAL | Age: 74
End: 2024-12-05
Payer: MEDICARE

## 2024-12-05 DIAGNOSIS — M81.0 OSTEOPOROSIS, UNSPECIFIED OSTEOPOROSIS TYPE, UNSPECIFIED PATHOLOGICAL FRACTURE PRESENCE: Primary | ICD-10-CM

## 2024-12-11 ENCOUNTER — TELEPHONE (OUTPATIENT)
Dept: NEUROLOGY | Facility: CLINIC | Age: 74
End: 2024-12-11
Payer: MEDICARE

## 2024-12-11 NOTE — TELEPHONE ENCOUNTER
TUCKERM in regards to getting pt scheduled for follow up per provider in JAN. Informed to call us back to be scheduled.

## 2024-12-16 ENCOUNTER — HOSPITAL ENCOUNTER (OUTPATIENT)
Dept: CT IMAGING | Facility: HOSPITAL | Age: 74
Discharge: HOME | End: 2024-12-16
Admitting: INTERNAL MEDICINE
Payer: MEDICARE

## 2024-12-16 DIAGNOSIS — R19.8 IRREGULAR BOWEL HABITS: ICD-10-CM

## 2024-12-16 DIAGNOSIS — K59.09 INTERMITTENT CONSTIPATION: ICD-10-CM

## 2024-12-16 DIAGNOSIS — R93.5 ABNORMAL ABDOMINAL X-RAY: ICD-10-CM

## 2024-12-16 PROCEDURE — 25510000001 IOPAMIDOL 61 % SOLUTION: Performed by: INTERNAL MEDICINE

## 2024-12-16 PROCEDURE — 74177 CT ABD & PELVIS W/CONTRAST: CPT

## 2024-12-16 RX ORDER — IOPAMIDOL 612 MG/ML
100 INJECTION, SOLUTION INTRAVASCULAR
Status: COMPLETED | OUTPATIENT
Start: 2024-12-16 | End: 2024-12-16

## 2024-12-16 RX ADMIN — IOPAMIDOL 85 ML: 612 INJECTION, SOLUTION INTRAVENOUS at 14:26

## 2024-12-20 ENCOUNTER — TELEPHONE (OUTPATIENT)
Dept: INTERNAL MEDICINE | Facility: CLINIC | Age: 74
End: 2024-12-20
Payer: MEDICARE

## 2024-12-20 NOTE — TELEPHONE ENCOUNTER
Caller: Joslyn Koo    Relationship to patient: Emergency Contact    Best call back number: [phone]     Patient is needing:  PATIENT'S HEALTH SURROGATE IS REQUESTING A CALLBACK TO DISCUSS CT SCAN AND IT'S RESULTS. IS REQUESTING A CALL ASAP.

## 2025-03-05 NOTE — Clinical Note
Okay,  her prescription is modified  Please inform the patient of the modification of her medication regimen   I understand that  she prefers  to take 2 tabs of olanzapine 2.5 mg at night instead of 1 tab of 5 mg at night for whatever reason   inserted over wire. Statement Selected

## (undated) DEVICE — RADIFOCUS GLIDEWIRE: Brand: GLIDEWIRE

## (undated) DEVICE — RUNTHROUGH NS EXTRA FLOPPY PTCA GUIDEWIRE: Brand: RUNTHROUGH

## (undated) DEVICE — KT ORCA ORCAPOD DISP STRL

## (undated) DEVICE — CANN O2 ETCO2 FITS ALL CONN CO2 SMPL A/ 7IN DISP LF

## (undated) DEVICE — 6F .070 XB 3.5 100CM: Brand: VISTA BRITE TIP

## (undated) DEVICE — GLIDESHEATH SLENDER STAINLESS STEEL KIT: Brand: GLIDESHEATH SLENDER

## (undated) DEVICE — CATH DIAG IMPULSE FL4 5F 100CM

## (undated) DEVICE — TREK CORONARY DILATATION CATHETER 3.50 MM X 15 MM / RAPID-EXCHANGE: Brand: TREK

## (undated) DEVICE — ADAPT CLN BIOGUARD AIR/H2O DISP

## (undated) DEVICE — NC TREK NEO™ CORONARY DILATATION CATHETER 3.00 MM X 15 MM / RAPID-EXCHANGE: Brand: NC TREK NEO™

## (undated) DEVICE — LN SMPL CO2 SHTRM SD STREAM W/M LUER

## (undated) DEVICE — Device: Brand: ASAHI SION BLUE

## (undated) DEVICE — STPCK 3/WY HP M/RA W/OFF/HNDL 1050PSI STRL

## (undated) DEVICE — NC TREK NEO™ CORONARY DILATATION CATHETER 4.00 MM X 8 MM / RAPID-EXCHANGE: Brand: NC TREK NEO™

## (undated) DEVICE — TUBING, SUCTION, 1/4" X 10', STRAIGHT: Brand: MEDLINE

## (undated) DEVICE — SENSR O2 OXIMAX FNGR A/ 18IN NONSTR

## (undated) DEVICE — CATH DIAG IMPULSE FR4 5F 100CM

## (undated) DEVICE — BALN PTCA TAKERU RX NC 3.5X21MM

## (undated) DEVICE — NC TREK NEO™ CORONARY DILATATION CATHETER 5.00 MM X 12 MM / RAPID-EXCHANGE: Brand: NC TREK NEO™

## (undated) DEVICE — PK CATH CARD 40

## (undated) DEVICE — TREK CORONARY DILATATION CATHETER 2.25 MM X 12 MM / RAPID-EXCHANGE: Brand: TREK

## (undated) DEVICE — DEV INDEFLATOR P/N 580289

## (undated) DEVICE — RADIFOCUS OPTITORQUE ANGIOGRAPHIC CATHETER: Brand: OPTITORQUE

## (undated) DEVICE — DGW .035 FC J3MM 260CM TEF: Brand: EMERALD

## (undated) DEVICE — TR BAND RADIAL ARTERY COMPRESSION DEVICE: Brand: TR BAND

## (undated) DEVICE — SWAN-GANZ BIPOLAR PACING CATHETER: Brand: SWAN-GANZ

## (undated) DEVICE — TREK CORONARY DILATATION CATHETER 4.0 MM X 20 MM / RAPID-EXCHANGE: Brand: TREK

## (undated) DEVICE — DISPOSABLE ADAPTER

## (undated) DEVICE — NC TREK NEO™ CORONARY DILATATION CATHETER 3.50 MM X 15 MM / RAPID-EXCHANGE: Brand: NC TREK NEO™

## (undated) DEVICE — 6F .070 JR 4 100CM: Brand: CORDIS

## (undated) DEVICE — KT MANIFLD CARDIAC

## (undated) DEVICE — 6F .070 XB 3 100CM: Brand: VISTA BRITE TIP

## (undated) DEVICE — BALN PTCA TAKERU RX NC 4X21MM

## (undated) DEVICE — CATH GUIDE GUIDELINERV3 5.5F 150CM

## (undated) DEVICE — CATH VENT MIV RADL PIG ST TIP 5F 110CM

## (undated) DEVICE — PINNACLE INTRODUCER SHEATH: Brand: PINNACLE

## (undated) DEVICE — Device

## (undated) DEVICE — XIENCE SKYPOINT™ EVEROLIMUS ELUTING CORONARY STENT SYSTEM 4.00 MM X 38 MM / RAPID-EXCHANGE
Type: IMPLANTABLE DEVICE | Site: CORONARY | Status: NON-FUNCTIONAL
Brand: XIENCE SKYPOINT™
Removed: 2024-07-05

## (undated) DEVICE — CORONARY IMAGING CATHETER: Brand: OPTICROSS™ 6 HD

## (undated) DEVICE — CATH DIAG IMPULSE FL3.5 5F 100CM

## (undated) DEVICE — CATH ELECTRD PACE TEMP BI NONHEP 5F110CM